# Patient Record
Sex: MALE | Race: WHITE | NOT HISPANIC OR LATINO | Employment: FULL TIME | URBAN - METROPOLITAN AREA
[De-identification: names, ages, dates, MRNs, and addresses within clinical notes are randomized per-mention and may not be internally consistent; named-entity substitution may affect disease eponyms.]

---

## 2018-12-24 ENCOUNTER — HOSPITAL ENCOUNTER (EMERGENCY)
Facility: CLINIC | Age: 58
Discharge: HOME | End: 2018-12-24
Attending: EMERGENCY MEDICINE | Admitting: EMERGENCY MEDICINE
Payer: COMMERCIAL

## 2018-12-24 VITALS
OXYGEN SATURATION: 99 % | WEIGHT: 165 LBS | TEMPERATURE: 98.3 F | HEART RATE: 73 BPM | SYSTOLIC BLOOD PRESSURE: 153 MMHG | DIASTOLIC BLOOD PRESSURE: 81 MMHG | RESPIRATION RATE: 16 BRPM

## 2018-12-24 DIAGNOSIS — H11.32 SCLERAL HEMORRHAGE OF LEFT EYE: ICD-10-CM

## 2018-12-24 PROCEDURE — 99282 EMERGENCY DEPT VISIT SF MDM: CPT

## 2018-12-24 ASSESSMENT — ENCOUNTER SYMPTOMS
EYE REDNESS: 1
EYE PAIN: 0

## 2018-12-24 NOTE — ED AVS SNAPSHOT
Emergency Department  [address] [place] [id number]  Phone:  [phone]  Fax:  [phone]                                    Ronnie Herrera Jr.   MRN: [id number]    Department:   Emergency Department   Date of Visit:  12/24/2018           After Visit Summary Signature Page    I have received my discharge instructions, and my questions have been answered. I have discussed any challenges I see with this plan with the nurse or doctor.    ..........................................................................................................................................  Patient/Patient Representative Signature      ..........................................................................................................................................  Patient Representative Print Name and Relationship to Patient    ..................................................               ................................................  Date                                   Time    ..........................................................................................................................................  Reviewed by Signature/Title    ...................................................              ..............................................  Date                                               Time          22EPIC Rev 08/18
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
## 2024-01-01 ENCOUNTER — ANCILLARY PROCEDURE (OUTPATIENT)
Dept: CT IMAGING | Facility: CLINIC | Age: 64
End: 2024-01-01
Attending: NURSE PRACTITIONER
Payer: COMMERCIAL

## 2024-01-01 ENCOUNTER — APPOINTMENT (OUTPATIENT)
Dept: PHYSICAL THERAPY | Facility: CLINIC | Age: 64
End: 2024-01-01
Attending: NURSE PRACTITIONER
Payer: COMMERCIAL

## 2024-01-01 ENCOUNTER — APPOINTMENT (OUTPATIENT)
Dept: CT IMAGING | Facility: CLINIC | Age: 64
End: 2024-01-01
Payer: COMMERCIAL

## 2024-01-01 ENCOUNTER — HOSPITAL ENCOUNTER (OUTPATIENT)
Facility: CLINIC | Age: 64
End: 2024-01-01
Payer: COMMERCIAL

## 2024-01-01 ENCOUNTER — TRANSFERRED RECORDS (OUTPATIENT)
Dept: HEALTH INFORMATION MANAGEMENT | Facility: CLINIC | Age: 64
End: 2024-01-01
Payer: COMMERCIAL

## 2024-01-01 ENCOUNTER — HOSPITAL ENCOUNTER (EMERGENCY)
Facility: CLINIC | Age: 64
Discharge: HOME | End: 2024-02-08
Attending: EMERGENCY MEDICINE | Admitting: EMERGENCY MEDICINE
Payer: COMMERCIAL

## 2024-01-01 ENCOUNTER — MEDICAL CORRESPONDENCE (OUTPATIENT)
Dept: SCHEDULING | Facility: CLINIC | Age: 64
End: 2024-01-01
Payer: COMMERCIAL

## 2024-01-01 ENCOUNTER — DOCUMENTATION ONLY (OUTPATIENT)
Dept: CARE COORDINATION | Facility: CLINIC | Age: 64
End: 2024-01-01
Payer: COMMERCIAL

## 2024-01-01 ENCOUNTER — HOSPITAL ENCOUNTER (OUTPATIENT)
Facility: CLINIC | Age: 64
Setting detail: OBSERVATION
Discharge: HOSPICE | End: 2024-04-21
Attending: STUDENT IN AN ORGANIZED HEALTH CARE EDUCATION/TRAINING PROGRAM | Admitting: INTERNAL MEDICINE
Payer: COMMERCIAL

## 2024-01-01 ENCOUNTER — HOSPITAL ENCOUNTER (OUTPATIENT)
Dept: CT IMAGING | Facility: CLINIC | Age: 64
Discharge: HOME | End: 2024-03-08
Attending: INTERNAL MEDICINE
Payer: COMMERCIAL

## 2024-01-01 ENCOUNTER — HOSPITAL ENCOUNTER (OUTPATIENT)
Facility: CLINIC | Age: 64
Discharge: HOME | End: 2024-03-08
Admitting: RADIOLOGY
Payer: COMMERCIAL

## 2024-01-01 ENCOUNTER — HOSPITAL ENCOUNTER (INPATIENT)
Facility: CLINIC | Age: 64
LOS: 13 days | End: 2024-05-04
Attending: INTERNAL MEDICINE | Admitting: INTERNAL MEDICINE
Payer: COMMERCIAL

## 2024-01-01 VITALS
OXYGEN SATURATION: 99 % | DIASTOLIC BLOOD PRESSURE: 88 MMHG | TEMPERATURE: 97.7 F | WEIGHT: 127 LBS | HEART RATE: 73 BPM | BODY MASS INDEX: 17.71 KG/M2 | RESPIRATION RATE: 16 BRPM | SYSTOLIC BLOOD PRESSURE: 132 MMHG

## 2024-01-01 VITALS
RESPIRATION RATE: 16 BRPM | TEMPERATURE: 97.7 F | WEIGHT: 110.27 LBS | HEART RATE: 65 BPM | HEIGHT: 71 IN | OXYGEN SATURATION: 98 % | SYSTOLIC BLOOD PRESSURE: 152 MMHG | BODY MASS INDEX: 15.44 KG/M2 | DIASTOLIC BLOOD PRESSURE: 67 MMHG

## 2024-01-01 VITALS
OXYGEN SATURATION: 99 % | BODY MASS INDEX: 19.6 KG/M2 | SYSTOLIC BLOOD PRESSURE: 150 MMHG | DIASTOLIC BLOOD PRESSURE: 99 MMHG | RESPIRATION RATE: 15 BRPM | HEART RATE: 63 BPM | TEMPERATURE: 98.9 F | WEIGHT: 140 LBS | HEIGHT: 71 IN

## 2024-01-01 VITALS — RESPIRATION RATE: 24 BRPM

## 2024-01-01 DIAGNOSIS — C20 RECTAL CANCER (H): ICD-10-CM

## 2024-01-01 DIAGNOSIS — K64.4 EXTERNAL HEMORRHOIDS: ICD-10-CM

## 2024-01-01 DIAGNOSIS — C78.01 SECONDARY MALIGNANT NEOPLASM OF RIGHT LUNG (H): ICD-10-CM

## 2024-01-01 DIAGNOSIS — R53.1 GENERALIZED WEAKNESS: ICD-10-CM

## 2024-01-01 DIAGNOSIS — R62.7 FAILURE TO THRIVE IN ADULT: ICD-10-CM

## 2024-01-01 DIAGNOSIS — C78.02 SECONDARY MALIGNANT NEOPLASM OF LEFT LUNG (H): ICD-10-CM

## 2024-01-01 DIAGNOSIS — K62.5 RECTAL BLEEDING: ICD-10-CM

## 2024-01-01 DIAGNOSIS — C20: ICD-10-CM

## 2024-01-01 DIAGNOSIS — C20 RECTAL CARCINOMA (H): ICD-10-CM

## 2024-01-01 LAB
ABO/RH(D): NORMAL
ALBUMIN SERPL BCG-MCNC: 3.9 G/DL (ref 3.5–5.2)
ALP SERPL-CCNC: 87 U/L (ref 40–150)
ALT SERPL W P-5'-P-CCNC: 9 U/L (ref 0–70)
ANION GAP SERPL CALCULATED.3IONS-SCNC: 14 MMOL/L (ref 7–15)
ANION GAP SERPL CALCULATED.3IONS-SCNC: 7 MMOL/L (ref 7–15)
ANION GAP SERPL CALCULATED.3IONS-SCNC: 7 MMOL/L (ref 7–15)
ANION GAP SERPL CALCULATED.3IONS-SCNC: 9 MMOL/L (ref 7–15)
ANTIBODY SCREEN: NEGATIVE
APTT PPP: 30 SECONDS (ref 22–38)
APTT PPP: 31 SECONDS (ref 22–38)
AST SERPL W P-5'-P-CCNC: 15 U/L (ref 0–45)
ATRIAL RATE - MUSE: 67 BPM
BASOPHILS # BLD AUTO: 0 10E3/UL (ref 0–0.2)
BASOPHILS # BLD AUTO: 0 10E3/UL (ref 0–0.2)
BASOPHILS NFR BLD AUTO: 0 %
BASOPHILS NFR BLD AUTO: 0 %
BILIRUB SERPL-MCNC: 0.8 MG/DL
BUN SERPL-MCNC: 10 MG/DL (ref 8–23)
BUN SERPL-MCNC: 14.8 MG/DL (ref 8–23)
BUN SERPL-MCNC: 21.5 MG/DL (ref 8–23)
BUN SERPL-MCNC: 8.8 MG/DL (ref 8–23)
CALCIUM SERPL-MCNC: 8.5 MG/DL (ref 8.8–10.2)
CALCIUM SERPL-MCNC: 8.9 MG/DL (ref 8.8–10.2)
CALCIUM SERPL-MCNC: 8.9 MG/DL (ref 8.8–10.2)
CALCIUM SERPL-MCNC: 9.4 MG/DL (ref 8.8–10.2)
CHLORIDE SERPL-SCNC: 102 MMOL/L (ref 98–107)
CHLORIDE SERPL-SCNC: 103 MMOL/L (ref 98–107)
CHLORIDE SERPL-SCNC: 103 MMOL/L (ref 98–107)
CHLORIDE SERPL-SCNC: 98 MMOL/L (ref 98–107)
CREAT SERPL-MCNC: 0.72 MG/DL (ref 0.67–1.17)
CREAT SERPL-MCNC: 0.76 MG/DL (ref 0.67–1.17)
CREAT SERPL-MCNC: 0.93 MG/DL (ref 0.67–1.17)
CREAT SERPL-MCNC: 0.98 MG/DL (ref 0.67–1.17)
DEPRECATED HCO3 PLAS-SCNC: 24 MMOL/L (ref 22–29)
DEPRECATED HCO3 PLAS-SCNC: 27 MMOL/L (ref 22–29)
DEPRECATED HCO3 PLAS-SCNC: 27 MMOL/L (ref 22–29)
DEPRECATED HCO3 PLAS-SCNC: 28 MMOL/L (ref 22–29)
DIASTOLIC BLOOD PRESSURE - MUSE: NORMAL MMHG
EGFRCR SERPLBLD CKD-EPI 2021: 86 ML/MIN/1.73M2
EGFRCR SERPLBLD CKD-EPI 2021: >90 ML/MIN/1.73M2
EOSINOPHIL # BLD AUTO: 0 10E3/UL (ref 0–0.7)
EOSINOPHIL # BLD AUTO: 0.1 10E3/UL (ref 0–0.7)
EOSINOPHIL NFR BLD AUTO: 1 %
EOSINOPHIL NFR BLD AUTO: 3 %
ERYTHROCYTE [DISTWIDTH] IN BLOOD BY AUTOMATED COUNT: 13.1 % (ref 10–15)
ERYTHROCYTE [DISTWIDTH] IN BLOOD BY AUTOMATED COUNT: 14.4 % (ref 10–15)
ERYTHROCYTE [DISTWIDTH] IN BLOOD BY AUTOMATED COUNT: 14.6 % (ref 10–15)
FERRITIN SERPL-MCNC: 467 NG/ML (ref 31–409)
GLUCOSE BLDC GLUCOMTR-MCNC: 122 MG/DL (ref 70–99)
GLUCOSE SERPL-MCNC: 103 MG/DL (ref 70–99)
GLUCOSE SERPL-MCNC: 114 MG/DL (ref 70–99)
GLUCOSE SERPL-MCNC: 116 MG/DL (ref 70–99)
GLUCOSE SERPL-MCNC: 120 MG/DL (ref 70–99)
HCT VFR BLD AUTO: 31.1 % (ref 40–53)
HCT VFR BLD AUTO: 32.8 % (ref 40–53)
HCT VFR BLD AUTO: 34.7 % (ref 40–53)
HCT VFR BLD AUTO: 35 % (ref 40–53)
HCT VFR BLD AUTO: 44.4 % (ref 40–53)
HGB BLD-MCNC: 10.4 G/DL (ref 13.3–17.7)
HGB BLD-MCNC: 11.3 G/DL (ref 13.3–17.7)
HGB BLD-MCNC: 11.5 G/DL (ref 13.3–17.7)
HGB BLD-MCNC: 11.6 G/DL (ref 13.3–17.7)
HGB BLD-MCNC: 11.7 G/DL (ref 13.3–17.7)
HGB BLD-MCNC: 15.5 G/DL (ref 13.3–17.7)
HOLD SPECIMEN: NORMAL
IMM GRANULOCYTES # BLD: 0 10E3/UL
IMM GRANULOCYTES # BLD: 0 10E3/UL
IMM GRANULOCYTES NFR BLD: 0 %
IMM GRANULOCYTES NFR BLD: 0 %
INR PPP: 0.95 (ref 0.85–1.15)
INR PPP: 0.98 (ref 0.85–1.15)
INTERPRETATION ECG - MUSE: NORMAL
IRON BINDING CAPACITY (ROCHE): 243 UG/DL (ref 240–430)
IRON SATN MFR SERPL: 19 % (ref 15–46)
IRON SERPL-MCNC: 47 UG/DL (ref 61–157)
LYMPHOCYTES # BLD AUTO: 0.4 10E3/UL (ref 0.8–5.3)
LYMPHOCYTES # BLD AUTO: 0.8 10E3/UL (ref 0.8–5.3)
LYMPHOCYTES NFR BLD AUTO: 11 %
LYMPHOCYTES NFR BLD AUTO: 15 %
MAGNESIUM SERPL-MCNC: 1.8 MG/DL (ref 1.7–2.3)
MAGNESIUM SERPL-MCNC: 1.9 MG/DL (ref 1.7–2.3)
MAGNESIUM SERPL-MCNC: 2.1 MG/DL (ref 1.7–2.3)
MAGNESIUM SERPL-MCNC: 2.2 MG/DL (ref 1.7–2.3)
MAGNESIUM SERPL-MCNC: 2.3 MG/DL (ref 1.7–2.3)
MCH RBC QN AUTO: 32.5 PG (ref 26.5–33)
MCH RBC QN AUTO: 32.5 PG (ref 26.5–33)
MCH RBC QN AUTO: 33 PG (ref 26.5–33)
MCH RBC QN AUTO: 33.3 PG (ref 26.5–33)
MCH RBC QN AUTO: 33.8 PG (ref 26.5–33)
MCHC RBC AUTO-ENTMCNC: 33.4 G/DL (ref 31.5–36.5)
MCHC RBC AUTO-ENTMCNC: 34.5 G/DL (ref 31.5–36.5)
MCHC RBC AUTO-ENTMCNC: 34.9 G/DL (ref 31.5–36.5)
MCV RBC AUTO: 97 FL (ref 78–100)
MCV RBC AUTO: 99 FL (ref 78–100)
MONOCYTES # BLD AUTO: 0.3 10E3/UL (ref 0–1.3)
MONOCYTES # BLD AUTO: 0.3 10E3/UL (ref 0–1.3)
MONOCYTES NFR BLD AUTO: 5 %
MONOCYTES NFR BLD AUTO: 9 %
NEUTROPHILS # BLD AUTO: 2.8 10E3/UL (ref 1.6–8.3)
NEUTROPHILS # BLD AUTO: 3.9 10E3/UL (ref 1.6–8.3)
NEUTROPHILS NFR BLD AUTO: 77 %
NEUTROPHILS NFR BLD AUTO: 79 %
NRBC # BLD AUTO: 0 10E3/UL
NRBC # BLD AUTO: 0 10E3/UL
NRBC BLD AUTO-RTO: 0 /100
NRBC BLD AUTO-RTO: 0 /100
P AXIS - MUSE: -19 DEGREES
PHOSPHATE SERPL-MCNC: 2.7 MG/DL (ref 2.5–4.5)
PHOSPHATE SERPL-MCNC: 2.8 MG/DL (ref 2.5–4.5)
PHOSPHATE SERPL-MCNC: 3 MG/DL (ref 2.5–4.5)
PHOSPHATE SERPL-MCNC: 3 MG/DL (ref 2.5–4.5)
PLATELET # BLD AUTO: 108 10E3/UL (ref 150–450)
PLATELET # BLD AUTO: 113 10E3/UL (ref 150–450)
PLATELET # BLD AUTO: 113 10E3/UL (ref 150–450)
PLATELET # BLD AUTO: 115 10E3/UL (ref 150–450)
PLATELET # BLD AUTO: 122 10E3/UL (ref 150–450)
POTASSIUM SERPL-SCNC: 3.7 MMOL/L (ref 3.4–5.3)
POTASSIUM SERPL-SCNC: 3.8 MMOL/L (ref 3.4–5.3)
POTASSIUM SERPL-SCNC: 3.9 MMOL/L (ref 3.4–5.3)
POTASSIUM SERPL-SCNC: 3.9 MMOL/L (ref 3.4–5.3)
POTASSIUM SERPL-SCNC: 4.1 MMOL/L (ref 3.4–5.3)
POTASSIUM SERPL-SCNC: 4.1 MMOL/L (ref 3.4–5.3)
PR INTERVAL - MUSE: 88 MS
PROT SERPL-MCNC: 6.8 G/DL (ref 6.4–8.3)
QRS DURATION - MUSE: 70 MS
QT - MUSE: 370 MS
QTC - MUSE: 390 MS
R AXIS - MUSE: 44 DEGREES
RBC # BLD AUTO: 3.2 10E6/UL (ref 4.4–5.9)
RBC # BLD AUTO: 3.39 10E6/UL (ref 4.4–5.9)
RBC # BLD AUTO: 3.55 10E6/UL (ref 4.4–5.9)
RBC # BLD AUTO: 3.57 10E6/UL (ref 4.4–5.9)
RBC # BLD AUTO: 4.58 10E6/UL (ref 4.4–5.9)
SODIUM SERPL-SCNC: 133 MMOL/L (ref 135–145)
SODIUM SERPL-SCNC: 137 MMOL/L (ref 135–145)
SODIUM SERPL-SCNC: 139 MMOL/L (ref 135–145)
SODIUM SERPL-SCNC: 140 MMOL/L (ref 135–145)
SPECIMEN EXPIRATION DATE: NORMAL
SYSTOLIC BLOOD PRESSURE - MUSE: NORMAL MMHG
T AXIS - MUSE: 33 DEGREES
TROPONIN T SERPL HS-MCNC: 9 NG/L
VENTRICULAR RATE- MUSE: 67 BPM
WBC # BLD AUTO: 3.6 10E3/UL (ref 4–11)
WBC # BLD AUTO: 4.2 10E3/UL (ref 4–11)
WBC # BLD AUTO: 4.8 10E3/UL (ref 4–11)
WBC # BLD AUTO: 5.1 10E3/UL (ref 4–11)
WBC # BLD AUTO: 5.5 10E3/UL (ref 4–11)

## 2024-01-01 PROCEDURE — 250N000013 HC RX MED GY IP 250 OP 250 PS 637: Performed by: INTERNAL MEDICINE

## 2024-01-01 PROCEDURE — 250N000011 HC RX IP 250 OP 636: Performed by: INTERNAL MEDICINE

## 2024-01-01 PROCEDURE — 250N000011 HC RX IP 250 OP 636: Mod: JZ | Performed by: INTERNAL MEDICINE

## 2024-01-01 PROCEDURE — 258N000003 HC RX IP 258 OP 636: Performed by: INTERNAL MEDICINE

## 2024-01-01 PROCEDURE — 99232 SBSQ HOSP IP/OBS MODERATE 35: CPT | Performed by: INTERNAL MEDICINE

## 2024-01-01 PROCEDURE — 99232 SBSQ HOSP IP/OBS MODERATE 35: CPT | Mod: GV | Performed by: HOSPITALIST

## 2024-01-01 PROCEDURE — 36415 COLL VENOUS BLD VENIPUNCTURE: CPT | Performed by: NURSE PRACTITIONER

## 2024-01-01 PROCEDURE — G0378 HOSPITAL OBSERVATION PER HR: HCPCS

## 2024-01-01 PROCEDURE — 96375 TX/PRO/DX INJ NEW DRUG ADDON: CPT

## 2024-01-01 PROCEDURE — 110N000005 HC R&B HOSPICE, ACCENT

## 2024-01-01 PROCEDURE — 83735 ASSAY OF MAGNESIUM: CPT | Performed by: INTERNAL MEDICINE

## 2024-01-01 PROCEDURE — 250N000011 HC RX IP 250 OP 636: Performed by: HOSPITALIST

## 2024-01-01 PROCEDURE — 84100 ASSAY OF PHOSPHORUS: CPT | Performed by: INTERNAL MEDICINE

## 2024-01-01 PROCEDURE — 36415 COLL VENOUS BLD VENIPUNCTURE: CPT | Performed by: INTERNAL MEDICINE

## 2024-01-01 PROCEDURE — 250N000013 HC RX MED GY IP 250 OP 250 PS 637: Performed by: NURSE PRACTITIONER

## 2024-01-01 PROCEDURE — 99231 SBSQ HOSP IP/OBS SF/LOW 25: CPT | Mod: GV | Performed by: INTERNAL MEDICINE

## 2024-01-01 PROCEDURE — 96361 HYDRATE IV INFUSION ADD-ON: CPT

## 2024-01-01 PROCEDURE — 250N000011 HC RX IP 250 OP 636: Performed by: STUDENT IN AN ORGANIZED HEALTH CARE EDUCATION/TRAINING PROGRAM

## 2024-01-01 PROCEDURE — 82728 ASSAY OF FERRITIN: CPT | Performed by: INTERNAL MEDICINE

## 2024-01-01 PROCEDURE — 250N000011 HC RX IP 250 OP 636

## 2024-01-01 PROCEDURE — 96374 THER/PROPH/DIAG INJ IV PUSH: CPT

## 2024-01-01 PROCEDURE — 99233 SBSQ HOSP IP/OBS HIGH 50: CPT | Performed by: INTERNAL MEDICINE

## 2024-01-01 PROCEDURE — 250N000011 HC RX IP 250 OP 636: Performed by: NURSE PRACTITIONER

## 2024-01-01 PROCEDURE — 85610 PROTHROMBIN TIME: CPT | Performed by: EMERGENCY MEDICINE

## 2024-01-01 PROCEDURE — 250N000009 HC RX 250

## 2024-01-01 PROCEDURE — 82374 ASSAY BLOOD CARBON DIOXIDE: CPT | Performed by: EMERGENCY MEDICINE

## 2024-01-01 PROCEDURE — 96376 TX/PRO/DX INJ SAME DRUG ADON: CPT

## 2024-01-01 PROCEDURE — 83735 ASSAY OF MAGNESIUM: CPT | Performed by: STUDENT IN AN ORGANIZED HEALTH CARE EDUCATION/TRAINING PROGRAM

## 2024-01-01 PROCEDURE — 36415 COLL VENOUS BLD VENIPUNCTURE: CPT

## 2024-01-01 PROCEDURE — 99232 SBSQ HOSP IP/OBS MODERATE 35: CPT | Mod: GV | Performed by: INTERNAL MEDICINE

## 2024-01-01 PROCEDURE — 99233 SBSQ HOSP IP/OBS HIGH 50: CPT | Mod: GV | Performed by: INTERNAL MEDICINE

## 2024-01-01 PROCEDURE — 84484 ASSAY OF TROPONIN QUANT: CPT

## 2024-01-01 PROCEDURE — 85018 HEMOGLOBIN: CPT

## 2024-01-01 PROCEDURE — 71260 CT THORAX DX C+: CPT

## 2024-01-01 PROCEDURE — 85027 COMPLETE CBC AUTOMATED: CPT | Performed by: INTERNAL MEDICINE

## 2024-01-01 PROCEDURE — 258N000003 HC RX IP 258 OP 636: Performed by: STUDENT IN AN ORGANIZED HEALTH CARE EDUCATION/TRAINING PROGRAM

## 2024-01-01 PROCEDURE — 99291 CRITICAL CARE FIRST HOUR: CPT

## 2024-01-01 PROCEDURE — 80048 BASIC METABOLIC PNL TOTAL CA: CPT | Performed by: INTERNAL MEDICINE

## 2024-01-01 PROCEDURE — 70450 CT HEAD/BRAIN W/O DYE: CPT | Mod: XU

## 2024-01-01 PROCEDURE — 97116 GAIT TRAINING THERAPY: CPT | Mod: GP

## 2024-01-01 PROCEDURE — 258N000003 HC RX IP 258 OP 636

## 2024-01-01 PROCEDURE — 70496 CT ANGIOGRAPHY HEAD: CPT

## 2024-01-01 PROCEDURE — 84132 ASSAY OF SERUM POTASSIUM: CPT | Performed by: INTERNAL MEDICINE

## 2024-01-01 PROCEDURE — 250N000009 HC RX 250: Performed by: INTERNAL MEDICINE

## 2024-01-01 PROCEDURE — 84100 ASSAY OF PHOSPHORUS: CPT | Performed by: STUDENT IN AN ORGANIZED HEALTH CARE EDUCATION/TRAINING PROGRAM

## 2024-01-01 PROCEDURE — 85025 COMPLETE CBC W/AUTO DIFF WBC: CPT | Performed by: NURSE PRACTITIONER

## 2024-01-01 PROCEDURE — 250N000009 HC RX 250: Performed by: NURSE PRACTITIONER

## 2024-01-01 PROCEDURE — 93010 ELECTROCARDIOGRAM REPORT: CPT | Performed by: INTERNAL MEDICINE

## 2024-01-01 PROCEDURE — 85027 COMPLETE CBC AUTOMATED: CPT | Performed by: NURSE PRACTITIONER

## 2024-01-01 PROCEDURE — 80048 BASIC METABOLIC PNL TOTAL CA: CPT | Performed by: NURSE PRACTITIONER

## 2024-01-01 PROCEDURE — 82374 ASSAY BLOOD CARBON DIOXIDE: CPT | Performed by: STUDENT IN AN ORGANIZED HEALTH CARE EDUCATION/TRAINING PROGRAM

## 2024-01-01 PROCEDURE — 250N000011 HC RX IP 250 OP 636: Mod: JZ

## 2024-01-01 PROCEDURE — 85610 PROTHROMBIN TIME: CPT

## 2024-01-01 PROCEDURE — 258N000003 HC RX IP 258 OP 636: Performed by: HOSPITALIST

## 2024-01-01 PROCEDURE — 99223 1ST HOSP IP/OBS HIGH 75: CPT | Performed by: NURSE PRACTITIONER

## 2024-01-01 PROCEDURE — 86900 BLOOD TYPING SEROLOGIC ABO: CPT | Performed by: EMERGENCY MEDICINE

## 2024-01-01 PROCEDURE — 36415 COLL VENOUS BLD VENIPUNCTURE: CPT | Performed by: STUDENT IN AN ORGANIZED HEALTH CARE EDUCATION/TRAINING PROGRAM

## 2024-01-01 PROCEDURE — 93005 ELECTROCARDIOGRAM TRACING: CPT

## 2024-01-01 PROCEDURE — 85730 THROMBOPLASTIN TIME PARTIAL: CPT | Performed by: EMERGENCY MEDICINE

## 2024-01-01 PROCEDURE — 85730 THROMBOPLASTIN TIME PARTIAL: CPT

## 2024-01-01 PROCEDURE — 99285 EMERGENCY DEPT VISIT HI MDM: CPT | Mod: 25

## 2024-01-01 PROCEDURE — 83550 IRON BINDING TEST: CPT | Performed by: INTERNAL MEDICINE

## 2024-01-01 PROCEDURE — 99283 EMERGENCY DEPT VISIT LOW MDM: CPT

## 2024-01-01 PROCEDURE — 99207 PR APP CREDIT; MD BILLING SHARED VISIT: CPT | Performed by: INTERNAL MEDICINE

## 2024-01-01 PROCEDURE — 97161 PT EVAL LOW COMPLEX 20 MIN: CPT | Mod: GP

## 2024-01-01 PROCEDURE — 85004 AUTOMATED DIFF WBC COUNT: CPT | Performed by: EMERGENCY MEDICINE

## 2024-01-01 PROCEDURE — 97530 THERAPEUTIC ACTIVITIES: CPT | Mod: GP

## 2024-01-01 PROCEDURE — 96375 TX/PRO/DX INJ NEW DRUG ADDON: CPT | Mod: 59

## 2024-01-01 PROCEDURE — 96360 HYDRATION IV INFUSION INIT: CPT

## 2024-01-01 PROCEDURE — 99207 PR APP CREDIT; MD BILLING SHARED VISIT: CPT | Mod: GV | Performed by: HOSPITALIST

## 2024-01-01 PROCEDURE — 82962 GLUCOSE BLOOD TEST: CPT

## 2024-01-01 PROCEDURE — 36415 COLL VENOUS BLD VENIPUNCTURE: CPT | Performed by: EMERGENCY MEDICINE

## 2024-01-01 PROCEDURE — 120N000001 HC R&B MED SURG/OB

## 2024-01-01 PROCEDURE — 250N000013 HC RX MED GY IP 250 OP 250 PS 637

## 2024-01-01 PROCEDURE — 83735 ASSAY OF MAGNESIUM: CPT | Performed by: EMERGENCY MEDICINE

## 2024-01-01 PROCEDURE — 84132 ASSAY OF SERUM POTASSIUM: CPT

## 2024-01-01 RX ORDER — NALOXONE HYDROCHLORIDE 0.4 MG/ML
0.2 INJECTION, SOLUTION INTRAMUSCULAR; INTRAVENOUS; SUBCUTANEOUS
Status: DISCONTINUED | OUTPATIENT
Start: 2024-01-01 | End: 2024-01-01 | Stop reason: HOSPADM

## 2024-01-01 RX ORDER — MORPHINE SULFATE 20 MG/ML
10 SOLUTION ORAL EVERY 6 HOURS
Status: DISCONTINUED | OUTPATIENT
Start: 2024-01-01 | End: 2024-01-01

## 2024-01-01 RX ORDER — MORPHINE SULFATE 4 MG/ML
4 INJECTION, SOLUTION INTRAMUSCULAR; INTRAVENOUS EVERY 4 HOURS PRN
Status: DISCONTINUED | OUTPATIENT
Start: 2024-01-01 | End: 2024-01-01

## 2024-01-01 RX ORDER — ACETAMINOPHEN 325 MG/1
650 TABLET ORAL EVERY 4 HOURS PRN
Status: CANCELLED | OUTPATIENT
Start: 2024-01-01

## 2024-01-01 RX ORDER — ONDANSETRON 2 MG/ML
4 INJECTION INTRAMUSCULAR; INTRAVENOUS EVERY 6 HOURS PRN
Status: DISCONTINUED | OUTPATIENT
Start: 2024-01-01 | End: 2024-01-01 | Stop reason: HOSPADM

## 2024-01-01 RX ORDER — NICOTINE 21 MG/24HR
1 PATCH, TRANSDERMAL 24 HOURS TRANSDERMAL DAILY
Status: DISCONTINUED | OUTPATIENT
Start: 2024-01-01 | End: 2024-01-01 | Stop reason: HOSPADM

## 2024-01-01 RX ORDER — MINERAL OIL/HYDROPHIL PETROLAT
OINTMENT (GRAM) TOPICAL
Status: DISCONTINUED | OUTPATIENT
Start: 2024-01-01 | End: 2024-01-01 | Stop reason: HOSPADM

## 2024-01-01 RX ORDER — BISACODYL 10 MG
10 SUPPOSITORY, RECTAL RECTAL
Status: DISCONTINUED | OUTPATIENT
Start: 2024-01-01 | End: 2024-01-01

## 2024-01-01 RX ORDER — CALCIUM CARBONATE 500 MG/1
1000 TABLET, CHEWABLE ORAL 4 TIMES DAILY PRN
Status: DISCONTINUED | OUTPATIENT
Start: 2024-01-01 | End: 2024-01-01 | Stop reason: HOSPADM

## 2024-01-01 RX ORDER — LORAZEPAM 1 MG/1
1 TABLET ORAL
COMMUNITY

## 2024-01-01 RX ORDER — MORPHINE SULFATE 20 MG/ML
10 SOLUTION ORAL
Status: DISCONTINUED | OUTPATIENT
Start: 2024-01-01 | End: 2024-01-01

## 2024-01-01 RX ORDER — IOPAMIDOL 755 MG/ML
90 INJECTION, SOLUTION INTRAVASCULAR ONCE
Status: COMPLETED | OUTPATIENT
Start: 2024-01-01 | End: 2024-01-01

## 2024-01-01 RX ORDER — MORPHINE SULFATE 20 MG/ML
15 SOLUTION ORAL
Status: DISCONTINUED | OUTPATIENT
Start: 2024-01-01 | End: 2024-01-01

## 2024-01-01 RX ORDER — NALOXONE HYDROCHLORIDE 0.4 MG/ML
0.2 INJECTION, SOLUTION INTRAMUSCULAR; INTRAVENOUS; SUBCUTANEOUS
Status: DISCONTINUED | OUTPATIENT
Start: 2024-01-01 | End: 2024-01-01

## 2024-01-01 RX ORDER — SENNOSIDES 8.6 MG
1 TABLET ORAL 2 TIMES DAILY PRN
Status: DISCONTINUED | OUTPATIENT
Start: 2024-01-01 | End: 2024-01-01

## 2024-01-01 RX ORDER — AMOXICILLIN 250 MG
1 CAPSULE ORAL 2 TIMES DAILY PRN
Status: DISCONTINUED | OUTPATIENT
Start: 2024-01-01 | End: 2024-01-01 | Stop reason: HOSPADM

## 2024-01-01 RX ORDER — GLYCOPYRROLATE 0.2 MG/ML
0.4 INJECTION, SOLUTION INTRAMUSCULAR; INTRAVENOUS EVERY 4 HOURS PRN
Status: DISCONTINUED | OUTPATIENT
Start: 2024-01-01 | End: 2024-01-01 | Stop reason: HOSPADM

## 2024-01-01 RX ORDER — LORAZEPAM 1 MG/1
1 TABLET ORAL
Status: CANCELLED | OUTPATIENT
Start: 2024-01-01

## 2024-01-01 RX ORDER — AMOXICILLIN 250 MG
1 CAPSULE ORAL 2 TIMES DAILY
Status: DISCONTINUED | OUTPATIENT
Start: 2024-01-01 | End: 2024-01-01

## 2024-01-01 RX ORDER — MORPHINE SULFATE 2 MG/ML
2 INJECTION, SOLUTION INTRAMUSCULAR; INTRAVENOUS
Status: DISCONTINUED | OUTPATIENT
Start: 2024-01-01 | End: 2024-01-01 | Stop reason: HOSPADM

## 2024-01-01 RX ORDER — HYDROMORPHONE HCL IN WATER/PF 6 MG/30 ML
0.2 PATIENT CONTROLLED ANALGESIA SYRINGE INTRAVENOUS
Status: DISCONTINUED | OUTPATIENT
Start: 2024-01-01 | End: 2024-01-01 | Stop reason: ALTCHOICE

## 2024-01-01 RX ORDER — GLYCOPYRROLATE 0.2 MG/ML
0.2 INJECTION, SOLUTION INTRAMUSCULAR; INTRAVENOUS EVERY 4 HOURS PRN
Status: DISCONTINUED | OUTPATIENT
Start: 2024-01-01 | End: 2024-01-01 | Stop reason: HOSPADM

## 2024-01-01 RX ORDER — MORPHINE SULFATE 4 MG/ML
5 INJECTION, SOLUTION INTRAMUSCULAR; INTRAVENOUS
Status: DISCONTINUED | OUTPATIENT
Start: 2024-01-01 | End: 2024-01-01

## 2024-01-01 RX ORDER — MORPHINE SULFATE 4 MG/ML
4 INJECTION, SOLUTION INTRAMUSCULAR; INTRAVENOUS
Status: DISCONTINUED | OUTPATIENT
Start: 2024-01-01 | End: 2024-01-01

## 2024-01-01 RX ORDER — MORPHINE SULFATE 2 MG/ML
1 INJECTION, SOLUTION INTRAMUSCULAR; INTRAVENOUS
Status: DISCONTINUED | OUTPATIENT
Start: 2024-01-01 | End: 2024-01-01

## 2024-01-01 RX ORDER — MORPHINE SULFATE 20 MG/ML
5 SOLUTION ORAL ONCE
Status: COMPLETED | OUTPATIENT
Start: 2024-01-01 | End: 2024-01-01

## 2024-01-01 RX ORDER — ACETAMINOPHEN 325 MG/1
325-650 TABLET ORAL PRN
COMMUNITY

## 2024-01-01 RX ORDER — MORPHINE SULFATE 20 MG/ML
10 SOLUTION ORAL
Status: CANCELLED | OUTPATIENT
Start: 2024-01-01

## 2024-01-01 RX ORDER — ATROPINE SULFATE 10 MG/ML
2 SOLUTION/ DROPS OPHTHALMIC EVERY 4 HOURS PRN
Status: CANCELLED | OUTPATIENT
Start: 2024-01-01

## 2024-01-01 RX ORDER — NALOXONE HYDROCHLORIDE 0.4 MG/ML
0.2 INJECTION, SOLUTION INTRAMUSCULAR; INTRAVENOUS; SUBCUTANEOUS
Status: CANCELLED | OUTPATIENT
Start: 2024-01-01

## 2024-01-01 RX ORDER — MORPHINE SULFATE 2 MG/ML
4 INJECTION, SOLUTION INTRAMUSCULAR; INTRAVENOUS
Status: DISCONTINUED | OUTPATIENT
Start: 2024-01-01 | End: 2024-01-01

## 2024-01-01 RX ORDER — OLANZAPINE 5 MG/1
10 TABLET, ORALLY DISINTEGRATING ORAL EVERY 4 HOURS PRN
Status: DISCONTINUED | OUTPATIENT
Start: 2024-01-01 | End: 2024-01-01

## 2024-01-01 RX ORDER — HYDRALAZINE HYDROCHLORIDE 10 MG/1
10 TABLET, FILM COATED ORAL EVERY 4 HOURS PRN
Status: DISCONTINUED | OUTPATIENT
Start: 2024-01-01 | End: 2024-01-01

## 2024-01-01 RX ORDER — POLYETHYLENE GLYCOL 3350 17 G/17G
17 POWDER, FOR SOLUTION ORAL DAILY
Status: DISCONTINUED | OUTPATIENT
Start: 2024-01-01 | End: 2024-01-01

## 2024-01-01 RX ORDER — SODIUM CHLORIDE 9 MG/ML
INJECTION, SOLUTION INTRAVENOUS CONTINUOUS
Status: DISCONTINUED | OUTPATIENT
Start: 2024-01-01 | End: 2024-01-01

## 2024-01-01 RX ORDER — MORPHINE SULFATE 2 MG/ML
2 INJECTION, SOLUTION INTRAMUSCULAR; INTRAVENOUS
Status: CANCELLED | OUTPATIENT
Start: 2024-01-01

## 2024-01-01 RX ORDER — LORAZEPAM 2 MG/ML
1 INJECTION INTRAMUSCULAR
Status: DISCONTINUED | OUTPATIENT
Start: 2024-01-01 | End: 2024-01-01

## 2024-01-01 RX ORDER — ONDANSETRON 4 MG/1
4 TABLET, ORALLY DISINTEGRATING ORAL EVERY 6 HOURS PRN
Status: DISCONTINUED | OUTPATIENT
Start: 2024-01-01 | End: 2024-01-01 | Stop reason: HOSPADM

## 2024-01-01 RX ORDER — LIDOCAINE 40 MG/G
CREAM TOPICAL
Status: DISCONTINUED | OUTPATIENT
Start: 2024-01-01 | End: 2024-01-01 | Stop reason: HOSPADM

## 2024-01-01 RX ORDER — AMOXICILLIN 250 MG
2 CAPSULE ORAL 2 TIMES DAILY PRN
Status: CANCELLED | OUTPATIENT
Start: 2024-01-01

## 2024-01-01 RX ORDER — WATER 10 ML/10ML
INJECTION INTRAMUSCULAR; INTRAVENOUS; SUBCUTANEOUS
Status: COMPLETED
Start: 2024-01-01 | End: 2024-01-01

## 2024-01-01 RX ORDER — LEVETIRACETAM 10 MG/ML
1000 INJECTION INTRAVASCULAR ONCE
Status: DISCONTINUED | OUTPATIENT
Start: 2024-01-01 | End: 2024-01-01

## 2024-01-01 RX ORDER — CAPECITABINE 500 MG/1
3 TABLET, FILM COATED ORAL
COMMUNITY

## 2024-01-01 RX ORDER — AMOXICILLIN 250 MG
2 CAPSULE ORAL 2 TIMES DAILY PRN
Status: DISCONTINUED | OUTPATIENT
Start: 2024-01-01 | End: 2024-01-01 | Stop reason: HOSPADM

## 2024-01-01 RX ORDER — MORPHINE SULFATE 20 MG/ML
10 SOLUTION ORAL
Status: DISCONTINUED | OUTPATIENT
Start: 2024-01-01 | End: 2024-01-01 | Stop reason: HOSPADM

## 2024-01-01 RX ORDER — HEPARIN SODIUM (PORCINE) LOCK FLUSH IV SOLN 100 UNIT/ML 100 UNIT/ML
5 SOLUTION INTRAVENOUS ONCE
Status: COMPLETED | OUTPATIENT
Start: 2024-01-01 | End: 2024-01-01

## 2024-01-01 RX ORDER — NALOXONE HYDROCHLORIDE 0.4 MG/ML
0.1 INJECTION, SOLUTION INTRAMUSCULAR; INTRAVENOUS; SUBCUTANEOUS
Status: DISCONTINUED | OUTPATIENT
Start: 2024-01-01 | End: 2024-01-01 | Stop reason: HOSPADM

## 2024-01-01 RX ORDER — MORPHINE SULFATE 20 MG/ML
10 SOLUTION ORAL EVERY 4 HOURS
Status: DISCONTINUED | OUTPATIENT
Start: 2024-01-01 | End: 2024-01-01

## 2024-01-01 RX ORDER — BISACODYL 10 MG
10 SUPPOSITORY, RECTAL RECTAL DAILY PRN
Status: DISCONTINUED | OUTPATIENT
Start: 2024-01-01 | End: 2024-01-01 | Stop reason: HOSPADM

## 2024-01-01 RX ORDER — LORAZEPAM 2 MG/ML
1 INJECTION INTRAMUSCULAR EVERY 4 HOURS
Status: DISCONTINUED | OUTPATIENT
Start: 2024-01-01 | End: 2024-01-01

## 2024-01-01 RX ORDER — NALOXONE HYDROCHLORIDE 0.4 MG/ML
0.4 INJECTION, SOLUTION INTRAMUSCULAR; INTRAVENOUS; SUBCUTANEOUS
Status: DISCONTINUED | OUTPATIENT
Start: 2024-01-01 | End: 2024-01-01

## 2024-01-01 RX ORDER — PROCHLORPERAZINE 25 MG
12.5 SUPPOSITORY, RECTAL RECTAL EVERY 12 HOURS PRN
Status: DISCONTINUED | OUTPATIENT
Start: 2024-01-01 | End: 2024-01-01 | Stop reason: HOSPADM

## 2024-01-01 RX ORDER — MORPHINE SULFATE 2 MG/ML
3 INJECTION, SOLUTION INTRAMUSCULAR; INTRAVENOUS
Status: DISCONTINUED | OUTPATIENT
Start: 2024-01-01 | End: 2024-01-01

## 2024-01-01 RX ORDER — BISACODYL 10 MG
10 SUPPOSITORY, RECTAL RECTAL
Status: DISCONTINUED | OUTPATIENT
Start: 2024-01-01 | End: 2024-01-01 | Stop reason: HOSPADM

## 2024-01-01 RX ORDER — MORPHINE SULFATE 4 MG/ML
4 INJECTION, SOLUTION INTRAMUSCULAR; INTRAVENOUS
Status: DISCONTINUED | OUTPATIENT
Start: 2024-01-01 | End: 2024-01-01 | Stop reason: HOSPADM

## 2024-01-01 RX ORDER — LORAZEPAM 2 MG/ML
1 INJECTION INTRAMUSCULAR EVERY 4 HOURS PRN
Status: DISCONTINUED | OUTPATIENT
Start: 2024-01-01 | End: 2024-01-01

## 2024-01-01 RX ORDER — HEPARIN SODIUM (PORCINE) LOCK FLUSH IV SOLN 100 UNIT/ML 100 UNIT/ML
5-10 SOLUTION INTRAVENOUS
Status: DISCONTINUED | OUTPATIENT
Start: 2024-01-01 | End: 2024-01-01 | Stop reason: HOSPADM

## 2024-01-01 RX ORDER — GLYCOPYRROLATE 0.2 MG/ML
0.2 INJECTION, SOLUTION INTRAMUSCULAR; INTRAVENOUS EVERY 4 HOURS PRN
Status: CANCELLED | OUTPATIENT
Start: 2024-01-01

## 2024-01-01 RX ORDER — LORAZEPAM 2 MG/ML
1 INJECTION INTRAMUSCULAR
Status: CANCELLED | OUTPATIENT
Start: 2024-01-01

## 2024-01-01 RX ORDER — LORAZEPAM 1 MG/1
1 TABLET ORAL
Status: DISCONTINUED | OUTPATIENT
Start: 2024-01-01 | End: 2024-01-01 | Stop reason: HOSPADM

## 2024-01-01 RX ORDER — ATROPINE SULFATE 10 MG/ML
2 SOLUTION/ DROPS OPHTHALMIC EVERY 4 HOURS PRN
Status: DISCONTINUED | OUTPATIENT
Start: 2024-01-01 | End: 2024-01-01 | Stop reason: HOSPADM

## 2024-01-01 RX ORDER — SENNOSIDES 8.6 MG
1 TABLET ORAL 2 TIMES DAILY PRN
Status: CANCELLED | OUTPATIENT
Start: 2024-01-01

## 2024-01-01 RX ORDER — MORPHINE SULFATE 4 MG/ML
5 INJECTION, SOLUTION INTRAMUSCULAR; INTRAVENOUS EVERY 4 HOURS PRN
Status: DISCONTINUED | OUTPATIENT
Start: 2024-01-01 | End: 2024-01-01

## 2024-01-01 RX ORDER — CARBOXYMETHYLCELLULOSE SODIUM 5 MG/ML
1-2 SOLUTION/ DROPS OPHTHALMIC
Status: CANCELLED | OUTPATIENT
Start: 2024-01-01

## 2024-01-01 RX ORDER — SODIUM CHLORIDE, SODIUM LACTATE, POTASSIUM CHLORIDE, CALCIUM CHLORIDE 600; 310; 30; 20 MG/100ML; MG/100ML; MG/100ML; MG/100ML
INJECTION, SOLUTION INTRAVENOUS CONTINUOUS
Status: DISCONTINUED | OUTPATIENT
Start: 2024-01-01 | End: 2024-01-01

## 2024-01-01 RX ORDER — BISACODYL 10 MG
10 SUPPOSITORY, RECTAL RECTAL
Status: CANCELLED | OUTPATIENT
Start: 2024-01-01

## 2024-01-01 RX ORDER — MORPHINE SULFATE 2 MG/ML
2 INJECTION, SOLUTION INTRAMUSCULAR; INTRAVENOUS
Status: DISCONTINUED | OUTPATIENT
Start: 2024-01-01 | End: 2024-01-01

## 2024-01-01 RX ORDER — AMOXICILLIN 250 MG
1 CAPSULE ORAL 2 TIMES DAILY PRN
Status: CANCELLED | OUTPATIENT
Start: 2024-01-01

## 2024-01-01 RX ORDER — ONDANSETRON 2 MG/ML
4 INJECTION INTRAMUSCULAR; INTRAVENOUS EVERY 6 HOURS PRN
Status: CANCELLED | OUTPATIENT
Start: 2024-01-01

## 2024-01-01 RX ORDER — LORAZEPAM 2 MG/ML
2 INJECTION INTRAMUSCULAR EVERY 4 HOURS
Status: DISCONTINUED | OUTPATIENT
Start: 2024-01-01 | End: 2024-01-01 | Stop reason: HOSPADM

## 2024-01-01 RX ORDER — LORAZEPAM 2 MG/ML
1 INJECTION INTRAMUSCULAR
Status: DISCONTINUED | OUTPATIENT
Start: 2024-01-01 | End: 2024-01-01 | Stop reason: HOSPADM

## 2024-01-01 RX ORDER — MORPHINE SULFATE 10 MG/ML
5 INJECTION, SOLUTION INTRAMUSCULAR; INTRAVENOUS
Status: DISCONTINUED | OUTPATIENT
Start: 2024-01-01 | End: 2024-01-01

## 2024-01-01 RX ORDER — MORPHINE SULFATE 20 MG/ML
15 SOLUTION ORAL EVERY 4 HOURS
Status: DISCONTINUED | OUTPATIENT
Start: 2024-01-01 | End: 2024-01-01

## 2024-01-01 RX ORDER — PROCHLORPERAZINE MALEATE 5 MG
5 TABLET ORAL EVERY 6 HOURS PRN
Status: DISCONTINUED | OUTPATIENT
Start: 2024-01-01 | End: 2024-01-01 | Stop reason: HOSPADM

## 2024-01-01 RX ORDER — ACETAMINOPHEN 325 MG/1
650 TABLET ORAL EVERY 4 HOURS PRN
Status: DISCONTINUED | OUTPATIENT
Start: 2024-01-01 | End: 2024-01-01 | Stop reason: HOSPADM

## 2024-01-01 RX ORDER — CALCIUM CARBONATE 500 MG/1
1000 TABLET, CHEWABLE ORAL 4 TIMES DAILY PRN
Status: CANCELLED | OUTPATIENT
Start: 2024-01-01

## 2024-01-01 RX ORDER — ACETAMINOPHEN 650 MG/1
650 SUPPOSITORY RECTAL EVERY 4 HOURS PRN
Status: DISCONTINUED | OUTPATIENT
Start: 2024-01-01 | End: 2024-01-01 | Stop reason: HOSPADM

## 2024-01-01 RX ORDER — HYDROMORPHONE HCL IN WATER/PF 6 MG/30 ML
0.4 PATIENT CONTROLLED ANALGESIA SYRINGE INTRAVENOUS
Status: DISCONTINUED | OUTPATIENT
Start: 2024-01-01 | End: 2024-01-01 | Stop reason: ALTCHOICE

## 2024-01-01 RX ORDER — LORAZEPAM 1 MG/1
1 TABLET ORAL
Status: DISCONTINUED | OUTPATIENT
Start: 2024-01-01 | End: 2024-01-01

## 2024-01-01 RX ORDER — NICOTINE 21 MG/24HR
1 PATCH, TRANSDERMAL 24 HOURS TRANSDERMAL DAILY
Status: CANCELLED | OUTPATIENT
Start: 2024-01-01

## 2024-01-01 RX ORDER — GLYCOPYRROLATE 0.2 MG/ML
0.2 INJECTION, SOLUTION INTRAMUSCULAR; INTRAVENOUS EVERY 4 HOURS PRN
Status: DISCONTINUED | OUTPATIENT
Start: 2024-01-01 | End: 2024-01-01

## 2024-01-01 RX ORDER — LORAZEPAM 2 MG/ML
1 INJECTION INTRAMUSCULAR EVERY 6 HOURS
Status: DISCONTINUED | OUTPATIENT
Start: 2024-01-01 | End: 2024-01-01

## 2024-01-01 RX ORDER — HEPARIN SODIUM,PORCINE 10 UNIT/ML
5-10 VIAL (ML) INTRAVENOUS EVERY 24 HOURS
Status: DISCONTINUED | OUTPATIENT
Start: 2024-01-01 | End: 2024-01-01 | Stop reason: HOSPADM

## 2024-01-01 RX ORDER — NALOXONE HYDROCHLORIDE 0.4 MG/ML
0.1 INJECTION, SOLUTION INTRAMUSCULAR; INTRAVENOUS; SUBCUTANEOUS
Status: CANCELLED | OUTPATIENT
Start: 2024-01-01

## 2024-01-01 RX ORDER — PROCHLORPERAZINE 25 MG
12.5 SUPPOSITORY, RECTAL RECTAL EVERY 12 HOURS PRN
Status: CANCELLED | OUTPATIENT
Start: 2024-01-01

## 2024-01-01 RX ORDER — IOPAMIDOL 755 MG/ML
69 INJECTION, SOLUTION INTRAVASCULAR ONCE
Status: COMPLETED | OUTPATIENT
Start: 2024-01-01 | End: 2024-01-01

## 2024-01-01 RX ORDER — NICOTINE 21 MG/24HR
1 PATCH, TRANSDERMAL 24 HOURS TRANSDERMAL
COMMUNITY

## 2024-01-01 RX ORDER — ACETAMINOPHEN 650 MG/1
650 SUPPOSITORY RECTAL EVERY 4 HOURS PRN
Status: CANCELLED | OUTPATIENT
Start: 2024-01-01

## 2024-01-01 RX ORDER — BISACODYL 10 MG
10 SUPPOSITORY, RECTAL RECTAL DAILY PRN
Status: CANCELLED | OUTPATIENT
Start: 2024-01-01

## 2024-01-01 RX ORDER — SENNOSIDES 8.6 MG
1 TABLET ORAL 2 TIMES DAILY PRN
Status: DISCONTINUED | OUTPATIENT
Start: 2024-01-01 | End: 2024-01-01 | Stop reason: HOSPADM

## 2024-01-01 RX ORDER — MORPHINE SULFATE 20 MG/ML
5 SOLUTION ORAL
Status: DISCONTINUED | OUTPATIENT
Start: 2024-01-01 | End: 2024-01-01

## 2024-01-01 RX ORDER — POLYETHYLENE GLYCOL 3350 17 G/17G
17 POWDER, FOR SOLUTION ORAL 2 TIMES DAILY PRN
Status: DISCONTINUED | OUTPATIENT
Start: 2024-01-01 | End: 2024-01-01 | Stop reason: HOSPADM

## 2024-01-01 RX ORDER — POLYETHYLENE GLYCOL 3350 17 G/17G
17 POWDER, FOR SOLUTION ORAL 2 TIMES DAILY PRN
Status: CANCELLED | OUTPATIENT
Start: 2024-01-01

## 2024-01-01 RX ORDER — HYDRALAZINE HYDROCHLORIDE 20 MG/ML
10 INJECTION INTRAMUSCULAR; INTRAVENOUS EVERY 4 HOURS PRN
Status: DISCONTINUED | OUTPATIENT
Start: 2024-01-01 | End: 2024-01-01

## 2024-01-01 RX ORDER — MINERAL OIL/HYDROPHIL PETROLAT
OINTMENT (GRAM) TOPICAL
Status: CANCELLED | OUTPATIENT
Start: 2024-01-01

## 2024-01-01 RX ORDER — CARBOXYMETHYLCELLULOSE SODIUM 5 MG/ML
1-2 SOLUTION/ DROPS OPHTHALMIC
Status: DISCONTINUED | OUTPATIENT
Start: 2024-01-01 | End: 2024-01-01 | Stop reason: HOSPADM

## 2024-01-01 RX ORDER — MORPHINE SULFATE 20 MG/ML
5 SOLUTION ORAL
Status: CANCELLED | OUTPATIENT
Start: 2024-01-01

## 2024-01-01 RX ORDER — LORAZEPAM 2 MG/ML
1 CONCENTRATE ORAL EVERY 4 HOURS
Status: DISCONTINUED | OUTPATIENT
Start: 2024-01-01 | End: 2024-01-01

## 2024-01-01 RX ORDER — OXYCODONE HYDROCHLORIDE 5 MG/1
5 TABLET ORAL EVERY 4 HOURS PRN
Status: DISCONTINUED | OUTPATIENT
Start: 2024-01-01 | End: 2024-01-01 | Stop reason: ALTCHOICE

## 2024-01-01 RX ORDER — LORAZEPAM 2 MG/ML
2 INJECTION INTRAMUSCULAR EVERY 6 HOURS
Status: DISCONTINUED | OUTPATIENT
Start: 2024-01-01 | End: 2024-01-01

## 2024-01-01 RX ORDER — HEPARIN SODIUM,PORCINE 10 UNIT/ML
5-10 VIAL (ML) INTRAVENOUS
Status: DISCONTINUED | OUTPATIENT
Start: 2024-01-01 | End: 2024-01-01 | Stop reason: HOSPADM

## 2024-01-01 RX ORDER — MORPHINE SULFATE 2 MG/ML
1 INJECTION, SOLUTION INTRAMUSCULAR; INTRAVENOUS
Status: CANCELLED | OUTPATIENT
Start: 2024-01-01

## 2024-01-01 RX ORDER — OLANZAPINE 5 MG/1
10 TABLET, ORALLY DISINTEGRATING ORAL EVERY 6 HOURS
Status: DISCONTINUED | OUTPATIENT
Start: 2024-01-01 | End: 2024-01-01

## 2024-01-01 RX ORDER — IOPAMIDOL 755 MG/ML
117 INJECTION, SOLUTION INTRAVASCULAR ONCE
Status: COMPLETED | OUTPATIENT
Start: 2024-01-01 | End: 2024-01-01

## 2024-01-01 RX ORDER — LIDOCAINE 40 MG/G
CREAM TOPICAL
Status: CANCELLED | OUTPATIENT
Start: 2024-01-01

## 2024-01-01 RX ORDER — MORPHINE SULFATE 2 MG/ML
1 INJECTION, SOLUTION INTRAMUSCULAR; INTRAVENOUS
Status: DISCONTINUED | OUTPATIENT
Start: 2024-01-01 | End: 2024-01-01 | Stop reason: HOSPADM

## 2024-01-01 RX ORDER — AMOXICILLIN 250 MG
2 CAPSULE ORAL 2 TIMES DAILY
Status: DISCONTINUED | OUTPATIENT
Start: 2024-01-01 | End: 2024-01-01

## 2024-01-01 RX ORDER — LORAZEPAM 2 MG/ML
1 CONCENTRATE ORAL EVERY 6 HOURS
Status: DISCONTINUED | OUTPATIENT
Start: 2024-01-01 | End: 2024-01-01

## 2024-01-01 RX ORDER — LORAZEPAM 2 MG/ML
2 INJECTION INTRAMUSCULAR
Status: DISCONTINUED | OUTPATIENT
Start: 2024-01-01 | End: 2024-01-01 | Stop reason: HOSPADM

## 2024-01-01 RX ORDER — SODIUM CHLORIDE 9 MG/ML
INJECTION, SOLUTION INTRAVENOUS CONTINUOUS
Status: DISCONTINUED | OUTPATIENT
Start: 2024-01-01 | End: 2024-01-01 | Stop reason: HOSPADM

## 2024-01-01 RX ORDER — ONDANSETRON 4 MG/1
4 TABLET, ORALLY DISINTEGRATING ORAL EVERY 6 HOURS PRN
Status: CANCELLED | OUTPATIENT
Start: 2024-01-01

## 2024-01-01 RX ORDER — MORPHINE SULFATE 20 MG/ML
5 SOLUTION ORAL
Status: DISCONTINUED | OUTPATIENT
Start: 2024-01-01 | End: 2024-01-01 | Stop reason: HOSPADM

## 2024-01-01 RX ORDER — LORAZEPAM 2 MG/ML
1 CONCENTRATE ORAL EVERY 4 HOURS PRN
Status: DISCONTINUED | OUTPATIENT
Start: 2024-01-01 | End: 2024-01-01

## 2024-01-01 RX ORDER — PROCHLORPERAZINE MALEATE 5 MG
5 TABLET ORAL EVERY 6 HOURS PRN
Status: CANCELLED | OUTPATIENT
Start: 2024-01-01

## 2024-01-01 RX ADMIN — Medication 5 ML: at 03:50

## 2024-01-01 RX ADMIN — LEVETIRACETAM 750 MG: 100 INJECTION, SOLUTION INTRAVENOUS at 20:52

## 2024-01-01 RX ADMIN — LEVETIRACETAM 750 MG: 100 INJECTION, SOLUTION INTRAVENOUS at 20:38

## 2024-01-01 RX ADMIN — MORPHINE SULFATE 4 MG: 2 INJECTION, SOLUTION INTRAMUSCULAR; INTRAVENOUS at 00:51

## 2024-01-01 RX ADMIN — LORAZEPAM 1 MG: 2 INJECTION INTRAMUSCULAR; INTRAVENOUS at 10:17

## 2024-01-01 RX ADMIN — ATROPINE SULFATE 2 DROP: 10 SOLUTION/ DROPS OPHTHALMIC at 17:15

## 2024-01-01 RX ADMIN — LORAZEPAM 1 MG: 2 INJECTION INTRAMUSCULAR; INTRAVENOUS at 05:22

## 2024-01-01 RX ADMIN — LORAZEPAM 1 MG: 2 INJECTION INTRAMUSCULAR; INTRAVENOUS at 05:58

## 2024-01-01 RX ADMIN — Medication 5 ML: at 08:48

## 2024-01-01 RX ADMIN — MORPHINE SULFATE 10 MG: 100 SOLUTION ORAL at 10:27

## 2024-01-01 RX ADMIN — LORAZEPAM 1 MG: 2 INJECTION INTRAMUSCULAR; INTRAVENOUS at 14:24

## 2024-01-01 RX ADMIN — MORPHINE SULFATE 4 MG: 4 INJECTION, SOLUTION INTRAMUSCULAR; INTRAVENOUS at 12:05

## 2024-01-01 RX ADMIN — LORAZEPAM 2 MG: 2 INJECTION INTRAMUSCULAR; INTRAVENOUS at 03:16

## 2024-01-01 RX ADMIN — Medication 5 ML: at 22:01

## 2024-01-01 RX ADMIN — Medication 5 ML: at 14:37

## 2024-01-01 RX ADMIN — MORPHINE SULFATE 5 MG: 4 INJECTION, SOLUTION INTRAMUSCULAR; INTRAVENOUS at 15:29

## 2024-01-01 RX ADMIN — LORAZEPAM 2 MG: 2 INJECTION INTRAMUSCULAR; INTRAVENOUS at 08:10

## 2024-01-01 RX ADMIN — MORPHINE SULFATE 4 MG: 4 INJECTION, SOLUTION INTRAMUSCULAR; INTRAVENOUS at 17:12

## 2024-01-01 RX ADMIN — NICOTINE 1 PATCH: 14 PATCH, EXTENDED RELEASE TRANSDERMAL at 08:34

## 2024-01-01 RX ADMIN — SODIUM CHLORIDE, POTASSIUM CHLORIDE, SODIUM LACTATE AND CALCIUM CHLORIDE: 600; 310; 30; 20 INJECTION, SOLUTION INTRAVENOUS at 01:48

## 2024-01-01 RX ADMIN — WATER 10 ML: 1 INJECTION INTRAMUSCULAR; INTRAVENOUS; SUBCUTANEOUS at 22:42

## 2024-01-01 RX ADMIN — LORAZEPAM 1 MG: 2 INJECTION INTRAMUSCULAR; INTRAVENOUS at 19:20

## 2024-01-01 RX ADMIN — NICOTINE 1 PATCH: 14 PATCH, EXTENDED RELEASE TRANSDERMAL at 08:21

## 2024-01-01 RX ADMIN — MORPHINE SULFATE 5 MG: 4 INJECTION, SOLUTION INTRAMUSCULAR; INTRAVENOUS at 00:54

## 2024-01-01 RX ADMIN — MORPHINE SULFATE 3 MG: 2 INJECTION, SOLUTION INTRAMUSCULAR; INTRAVENOUS at 18:27

## 2024-01-01 RX ADMIN — MORPHINE SULFATE 15 MG: 100 SOLUTION ORAL at 02:09

## 2024-01-01 RX ADMIN — SODIUM CHLORIDE: 9 INJECTION, SOLUTION INTRAVENOUS at 18:12

## 2024-01-01 RX ADMIN — LEVETIRACETAM 750 MG: 100 INJECTION, SOLUTION INTRAVENOUS at 08:33

## 2024-01-01 RX ADMIN — MORPHINE SULFATE 4 MG: 4 INJECTION, SOLUTION INTRAMUSCULAR; INTRAVENOUS at 05:42

## 2024-01-01 RX ADMIN — MORPHINE SULFATE 10 MG: 100 SOLUTION ORAL at 14:16

## 2024-01-01 RX ADMIN — MORPHINE SULFATE 5 MG: 100 SOLUTION ORAL at 01:29

## 2024-01-01 RX ADMIN — MORPHINE SULFATE 5 MG: 4 INJECTION, SOLUTION INTRAMUSCULAR; INTRAVENOUS at 03:05

## 2024-01-01 RX ADMIN — MORPHINE SULFATE 10 MG: 100 SOLUTION ORAL at 18:22

## 2024-01-01 RX ADMIN — MORPHINE SULFATE 4 MG: 4 INJECTION, SOLUTION INTRAMUSCULAR; INTRAVENOUS at 04:41

## 2024-01-01 RX ADMIN — MORPHINE SULFATE 15 MG: 100 SOLUTION ORAL at 18:29

## 2024-01-01 RX ADMIN — GLYCOPYRROLATE 0.4 MG: 0.2 INJECTION, SOLUTION INTRAMUSCULAR; INTRAVENOUS at 16:22

## 2024-01-01 RX ADMIN — LEVETIRACETAM 750 MG: 100 INJECTION, SOLUTION INTRAVENOUS at 09:08

## 2024-01-01 RX ADMIN — NICOTINE 1 PATCH: 14 PATCH, EXTENDED RELEASE TRANSDERMAL at 09:25

## 2024-01-01 RX ADMIN — LEVETIRACETAM 750 MG: 100 INJECTION, SOLUTION INTRAVENOUS at 09:17

## 2024-01-01 RX ADMIN — MORPHINE SULFATE 4 MG: 4 INJECTION, SOLUTION INTRAMUSCULAR; INTRAVENOUS at 19:36

## 2024-01-01 RX ADMIN — MORPHINE SULFATE 4 MG: 2 INJECTION, SOLUTION INTRAMUSCULAR; INTRAVENOUS at 17:18

## 2024-01-01 RX ADMIN — NICOTINE 1 PATCH: 14 PATCH, EXTENDED RELEASE TRANSDERMAL at 11:19

## 2024-01-01 RX ADMIN — MORPHINE SULFATE 4 MG: 4 INJECTION, SOLUTION INTRAMUSCULAR; INTRAVENOUS at 18:47

## 2024-01-01 RX ADMIN — LEVETIRACETAM 2000 MG: 100 INJECTION, SOLUTION INTRAVENOUS at 19:00

## 2024-01-01 RX ADMIN — LORAZEPAM 1 MG: 2 INJECTION INTRAMUSCULAR; INTRAVENOUS at 00:54

## 2024-01-01 RX ADMIN — MORPHINE SULFATE 4 MG: 4 INJECTION, SOLUTION INTRAMUSCULAR; INTRAVENOUS at 12:47

## 2024-01-01 RX ADMIN — LEVETIRACETAM 750 MG: 100 INJECTION, SOLUTION INTRAVENOUS at 21:10

## 2024-01-01 RX ADMIN — MORPHINE SULFATE 4 MG: 2 INJECTION, SOLUTION INTRAMUSCULAR; INTRAVENOUS at 18:34

## 2024-01-01 RX ADMIN — HYDROMORPHONE HYDROCHLORIDE 0.4 MG: 0.2 INJECTION, SOLUTION INTRAMUSCULAR; INTRAVENOUS; SUBCUTANEOUS at 13:10

## 2024-01-01 RX ADMIN — MORPHINE SULFATE 10 MG: 100 SOLUTION ORAL at 10:08

## 2024-01-01 RX ADMIN — MORPHINE SULFATE 4 MG: 2 INJECTION, SOLUTION INTRAMUSCULAR; INTRAVENOUS at 02:55

## 2024-01-01 RX ADMIN — LORAZEPAM 2 MG: 2 INJECTION INTRAMUSCULAR; INTRAVENOUS at 04:12

## 2024-01-01 RX ADMIN — LEVETIRACETAM 750 MG: 100 INJECTION, SOLUTION INTRAVENOUS at 20:55

## 2024-01-01 RX ADMIN — SODIUM CHLORIDE: 9 INJECTION, SOLUTION INTRAVENOUS at 16:00

## 2024-01-01 RX ADMIN — LORAZEPAM 1 MG: 1 TABLET ORAL at 14:53

## 2024-01-01 RX ADMIN — LORAZEPAM 1 MG: 2 INJECTION INTRAMUSCULAR; INTRAVENOUS at 21:51

## 2024-01-01 RX ADMIN — MORPHINE SULFATE 4 MG: 4 INJECTION, SOLUTION INTRAMUSCULAR; INTRAVENOUS at 02:11

## 2024-01-01 RX ADMIN — NICOTINE 1 PATCH: 14 PATCH, EXTENDED RELEASE TRANSDERMAL at 16:39

## 2024-01-01 RX ADMIN — MORPHINE SULFATE 10 MG: 100 SOLUTION ORAL at 13:19

## 2024-01-01 RX ADMIN — Medication 5 ML: at 08:22

## 2024-01-01 RX ADMIN — LORAZEPAM 1 MG: 2 CONCENTRATE ORAL at 20:24

## 2024-01-01 RX ADMIN — MORPHINE SULFATE 10 MG: 100 SOLUTION ORAL at 15:30

## 2024-01-01 RX ADMIN — MORPHINE SULFATE 4 MG: 4 INJECTION, SOLUTION INTRAMUSCULAR; INTRAVENOUS at 14:01

## 2024-01-01 RX ADMIN — OLANZAPINE 10 MG: 5 TABLET, ORALLY DISINTEGRATING ORAL at 11:19

## 2024-01-01 RX ADMIN — LORAZEPAM 2 MG: 2 INJECTION INTRAMUSCULAR; INTRAVENOUS at 14:32

## 2024-01-01 RX ADMIN — MORPHINE SULFATE 15 MG: 100 SOLUTION ORAL at 23:43

## 2024-01-01 RX ADMIN — MORPHINE SULFATE 4 MG: 4 INJECTION, SOLUTION INTRAMUSCULAR; INTRAVENOUS at 16:59

## 2024-01-01 RX ADMIN — MORPHINE SULFATE 4 MG: 4 INJECTION, SOLUTION INTRAMUSCULAR; INTRAVENOUS at 00:27

## 2024-01-01 RX ADMIN — MORPHINE SULFATE 10 MG: 100 SOLUTION ORAL at 00:56

## 2024-01-01 RX ADMIN — MORPHINE SULFATE 4 MG: 4 INJECTION, SOLUTION INTRAMUSCULAR; INTRAVENOUS at 02:56

## 2024-01-01 RX ADMIN — LEVETIRACETAM 750 MG: 100 INJECTION, SOLUTION INTRAVENOUS at 20:59

## 2024-01-01 RX ADMIN — MORPHINE SULFATE 4 MG: 4 INJECTION, SOLUTION INTRAMUSCULAR; INTRAVENOUS at 10:58

## 2024-01-01 RX ADMIN — MORPHINE SULFATE 10 MG: 100 SOLUTION ORAL at 06:43

## 2024-01-01 RX ADMIN — MORPHINE SULFATE 10 MG: 100 SOLUTION ORAL at 22:53

## 2024-01-01 RX ADMIN — MORPHINE SULFATE 5 MG: 100 SOLUTION ORAL at 01:49

## 2024-01-01 RX ADMIN — MORPHINE SULFATE 10 MG: 100 SOLUTION ORAL at 10:21

## 2024-01-01 RX ADMIN — MORPHINE SULFATE 10 MG: 100 SOLUTION ORAL at 17:54

## 2024-01-01 RX ADMIN — IOPAMIDOL 69 ML: 755 INJECTION, SOLUTION INTRAVENOUS at 07:28

## 2024-01-01 RX ADMIN — SODIUM CHLORIDE 60 ML: 9 INJECTION, SOLUTION INTRAVENOUS at 07:27

## 2024-01-01 RX ADMIN — MORPHINE SULFATE 4 MG: 2 INJECTION, SOLUTION INTRAMUSCULAR; INTRAVENOUS at 06:59

## 2024-01-01 RX ADMIN — Medication 5 ML: at 21:23

## 2024-01-01 RX ADMIN — ATROPINE SULFATE 2 DROP: 10 SOLUTION/ DROPS OPHTHALMIC at 11:06

## 2024-01-01 RX ADMIN — GLYCOPYRROLATE 0.2 MG: 0.2 INJECTION, SOLUTION INTRAMUSCULAR; INTRAVENOUS at 09:51

## 2024-01-01 RX ADMIN — LORAZEPAM 1 MG: 2 INJECTION INTRAMUSCULAR; INTRAVENOUS at 03:45

## 2024-01-01 RX ADMIN — MORPHINE SULFATE 3 MG: 2 INJECTION, SOLUTION INTRAMUSCULAR; INTRAVENOUS at 06:59

## 2024-01-01 RX ADMIN — MORPHINE SULFATE 10 MG: 100 SOLUTION ORAL at 03:10

## 2024-01-01 RX ADMIN — LORAZEPAM 1 MG: 2 INJECTION INTRAMUSCULAR; INTRAVENOUS at 01:36

## 2024-01-01 RX ADMIN — LORAZEPAM 1 MG: 2 INJECTION INTRAMUSCULAR; INTRAVENOUS at 12:44

## 2024-01-01 RX ADMIN — MORPHINE SULFATE 5 MG: 4 INJECTION, SOLUTION INTRAMUSCULAR; INTRAVENOUS at 08:20

## 2024-01-01 RX ADMIN — LORAZEPAM 1 MG: 2 INJECTION INTRAMUSCULAR; INTRAVENOUS at 16:50

## 2024-01-01 RX ADMIN — MORPHINE SULFATE 5 MG: 4 INJECTION, SOLUTION INTRAMUSCULAR; INTRAVENOUS at 20:09

## 2024-01-01 RX ADMIN — ATROPINE SULFATE 2 DROP: 10 SOLUTION/ DROPS OPHTHALMIC at 23:45

## 2024-01-01 RX ADMIN — LEVETIRACETAM 750 MG: 100 INJECTION, SOLUTION INTRAVENOUS at 08:48

## 2024-01-01 RX ADMIN — LORAZEPAM 1 MG: 2 INJECTION INTRAMUSCULAR; INTRAVENOUS at 17:07

## 2024-01-01 RX ADMIN — MORPHINE SULFATE 4 MG: 4 INJECTION, SOLUTION INTRAMUSCULAR; INTRAVENOUS at 06:30

## 2024-01-01 RX ADMIN — MORPHINE SULFATE 4 MG/HR: 10 INJECTION INTRAVENOUS at 15:58

## 2024-01-01 RX ADMIN — LORAZEPAM 1 MG: 2 INJECTION INTRAMUSCULAR; INTRAVENOUS at 13:23

## 2024-01-01 RX ADMIN — LORAZEPAM 1 MG: 2 INJECTION INTRAMUSCULAR; INTRAVENOUS at 20:25

## 2024-01-01 RX ADMIN — MORPHINE SULFATE 4 MG: 4 INJECTION, SOLUTION INTRAMUSCULAR; INTRAVENOUS at 16:47

## 2024-01-01 RX ADMIN — LORAZEPAM 1 MG: 2 LIQUID ORAL at 09:18

## 2024-01-01 RX ADMIN — MORPHINE SULFATE 2 MG: 2 INJECTION, SOLUTION INTRAMUSCULAR; INTRAVENOUS at 20:42

## 2024-01-01 RX ADMIN — NICOTINE 1 PATCH: 14 PATCH, EXTENDED RELEASE TRANSDERMAL at 08:17

## 2024-01-01 RX ADMIN — LEVETIRACETAM 750 MG: 100 INJECTION, SOLUTION INTRAVENOUS at 11:15

## 2024-01-01 RX ADMIN — MORPHINE SULFATE 5 MG: 4 INJECTION, SOLUTION INTRAMUSCULAR; INTRAVENOUS at 16:25

## 2024-01-01 RX ADMIN — LORAZEPAM 1 MG: 2 INJECTION INTRAMUSCULAR; INTRAVENOUS at 12:47

## 2024-01-01 RX ADMIN — LORAZEPAM 1 MG: 2 INJECTION INTRAMUSCULAR; INTRAVENOUS at 04:41

## 2024-01-01 RX ADMIN — MORPHINE SULFATE 5 MG: 4 INJECTION, SOLUTION INTRAMUSCULAR; INTRAVENOUS at 22:07

## 2024-01-01 RX ADMIN — NICOTINE 1 PATCH: 14 PATCH, EXTENDED RELEASE TRANSDERMAL at 10:13

## 2024-01-01 RX ADMIN — Medication 5 ML: at 18:46

## 2024-01-01 RX ADMIN — IOPAMIDOL 90 ML: 755 INJECTION, SOLUTION INTRAVENOUS at 15:20

## 2024-01-01 RX ADMIN — MORPHINE SULFATE 4 MG: 4 INJECTION, SOLUTION INTRAMUSCULAR; INTRAVENOUS at 07:55

## 2024-01-01 RX ADMIN — MORPHINE SULFATE 4 MG: 4 INJECTION, SOLUTION INTRAMUSCULAR; INTRAVENOUS at 22:00

## 2024-01-01 RX ADMIN — MORPHINE SULFATE 15 MG: 100 SOLUTION ORAL at 22:08

## 2024-01-01 RX ADMIN — LORAZEPAM 1 MG: 2 INJECTION INTRAMUSCULAR; INTRAVENOUS at 11:15

## 2024-01-01 RX ADMIN — LEVETIRACETAM 750 MG: 100 INJECTION, SOLUTION INTRAVENOUS at 20:25

## 2024-01-01 RX ADMIN — LORAZEPAM 1 MG: 2 INJECTION INTRAMUSCULAR; INTRAVENOUS at 13:30

## 2024-01-01 RX ADMIN — MORPHINE SULFATE 10 MG: 100 SOLUTION ORAL at 08:37

## 2024-01-01 RX ADMIN — MORPHINE SULFATE 10 MG: 100 SOLUTION ORAL at 18:26

## 2024-01-01 RX ADMIN — Medication 5 ML: at 19:57

## 2024-01-01 RX ADMIN — LEVETIRACETAM 750 MG: 100 INJECTION, SOLUTION INTRAVENOUS at 09:37

## 2024-01-01 RX ADMIN — LORAZEPAM 1 MG: 2 INJECTION INTRAMUSCULAR; INTRAVENOUS at 04:37

## 2024-01-01 RX ADMIN — LEVETIRACETAM 750 MG: 100 INJECTION, SOLUTION INTRAVENOUS at 21:18

## 2024-01-01 RX ADMIN — MORPHINE SULFATE 3 MG: 2 INJECTION, SOLUTION INTRAMUSCULAR; INTRAVENOUS at 00:44

## 2024-01-01 RX ADMIN — ATROPINE SULFATE 2 DROP: 10 SOLUTION/ DROPS OPHTHALMIC at 17:12

## 2024-01-01 RX ADMIN — OLANZAPINE 10 MG: 5 TABLET, ORALLY DISINTEGRATING ORAL at 16:23

## 2024-01-01 RX ADMIN — LORAZEPAM 1 MG: 2 INJECTION INTRAMUSCULAR; INTRAVENOUS at 18:02

## 2024-01-01 RX ADMIN — MORPHINE SULFATE 4 MG: 4 INJECTION, SOLUTION INTRAMUSCULAR; INTRAVENOUS at 19:57

## 2024-01-01 RX ADMIN — SODIUM CHLORIDE: 9 INJECTION, SOLUTION INTRAVENOUS at 21:17

## 2024-01-01 RX ADMIN — LORAZEPAM 1 MG: 2 INJECTION INTRAMUSCULAR; INTRAVENOUS at 18:19

## 2024-01-01 RX ADMIN — Medication 5 ML: at 00:27

## 2024-01-01 RX ADMIN — NICOTINE 1 PATCH: 14 PATCH, EXTENDED RELEASE TRANSDERMAL at 08:26

## 2024-01-01 RX ADMIN — NICOTINE 1 PATCH: 14 PATCH, EXTENDED RELEASE TRANSDERMAL at 12:27

## 2024-01-01 RX ADMIN — LORAZEPAM 2 MG: 2 INJECTION INTRAMUSCULAR; INTRAVENOUS at 01:02

## 2024-01-01 RX ADMIN — LEVETIRACETAM 750 MG: 100 INJECTION, SOLUTION INTRAVENOUS at 08:10

## 2024-01-01 RX ADMIN — GLYCOPYRROLATE 0.4 MG: 0.2 INJECTION, SOLUTION INTRAMUSCULAR; INTRAVENOUS at 12:48

## 2024-01-01 RX ADMIN — ATROPINE SULFATE 2 DROP: 10 SOLUTION/ DROPS OPHTHALMIC at 08:21

## 2024-01-01 RX ADMIN — MORPHINE SULFATE 4 MG/HR: 10 INJECTION INTRAVENOUS at 18:34

## 2024-01-01 RX ADMIN — MORPHINE SULFATE 4 MG: 4 INJECTION, SOLUTION INTRAMUSCULAR; INTRAVENOUS at 14:44

## 2024-01-01 RX ADMIN — SODIUM CHLORIDE 100 ML: 9 INJECTION, SOLUTION INTRAVENOUS at 18:19

## 2024-01-01 RX ADMIN — GLYCOPYRROLATE 0.4 MG: 0.2 INJECTION, SOLUTION INTRAMUSCULAR; INTRAVENOUS at 17:42

## 2024-01-01 RX ADMIN — MORPHINE SULFATE 4 MG: 4 INJECTION, SOLUTION INTRAMUSCULAR; INTRAVENOUS at 12:21

## 2024-01-01 RX ADMIN — MORPHINE SULFATE 4 MG: 4 INJECTION, SOLUTION INTRAMUSCULAR; INTRAVENOUS at 03:45

## 2024-01-01 RX ADMIN — Medication 5 ML: at 22:49

## 2024-01-01 RX ADMIN — GLYCOPYRROLATE 0.2 MG: 0.2 INJECTION, SOLUTION INTRAMUSCULAR; INTRAVENOUS at 16:46

## 2024-01-01 RX ADMIN — NICOTINE 1 PATCH: 14 PATCH, EXTENDED RELEASE TRANSDERMAL at 08:31

## 2024-01-01 RX ADMIN — MORPHINE SULFATE 4 MG: 4 INJECTION, SOLUTION INTRAMUSCULAR; INTRAVENOUS at 10:01

## 2024-01-01 RX ADMIN — MORPHINE SULFATE 10 MG: 100 SOLUTION ORAL at 13:43

## 2024-01-01 RX ADMIN — MORPHINE SULFATE 5 MG: 100 SOLUTION ORAL at 21:43

## 2024-01-01 RX ADMIN — Medication 5 ML: at 04:41

## 2024-01-01 RX ADMIN — MORPHINE SULFATE 15 MG: 100 SOLUTION ORAL at 16:23

## 2024-01-01 RX ADMIN — LORAZEPAM 1 MG: 2 INJECTION INTRAMUSCULAR; INTRAVENOUS at 08:48

## 2024-01-01 RX ADMIN — MORPHINE SULFATE 5 MG/HR: 10 INJECTION INTRAVENOUS at 12:29

## 2024-01-01 RX ADMIN — WATER 10 ML: 1 INJECTION INTRAMUSCULAR; INTRAVENOUS; SUBCUTANEOUS at 01:32

## 2024-01-01 RX ADMIN — LORAZEPAM 1 MG: 2 INJECTION INTRAMUSCULAR; INTRAVENOUS at 08:13

## 2024-01-01 RX ADMIN — MORPHINE SULFATE 4 MG: 4 INJECTION, SOLUTION INTRAMUSCULAR; INTRAVENOUS at 20:25

## 2024-01-01 RX ADMIN — MORPHINE SULFATE 4 MG: 4 INJECTION, SOLUTION INTRAMUSCULAR; INTRAVENOUS at 14:37

## 2024-01-01 RX ADMIN — LORAZEPAM 2 MG: 2 INJECTION INTRAMUSCULAR; INTRAVENOUS at 20:32

## 2024-01-01 RX ADMIN — LORAZEPAM 2 MG: 2 INJECTION INTRAMUSCULAR; INTRAVENOUS at 10:49

## 2024-01-01 RX ADMIN — MORPHINE SULFATE 5 MG: 4 INJECTION, SOLUTION INTRAMUSCULAR; INTRAVENOUS at 18:24

## 2024-01-01 RX ADMIN — ATROPINE SULFATE 2 DROP: 10 SOLUTION/ DROPS OPHTHALMIC at 02:57

## 2024-01-01 RX ADMIN — ATROPINE SULFATE 2 DROP: 10 SOLUTION/ DROPS OPHTHALMIC at 00:30

## 2024-01-01 RX ADMIN — LEVETIRACETAM 750 MG: 100 INJECTION, SOLUTION INTRAVENOUS at 10:49

## 2024-01-01 RX ADMIN — MORPHINE SULFATE 15 MG: 100 SOLUTION ORAL at 13:17

## 2024-01-01 RX ADMIN — GLYCOPYRROLATE 0.4 MG: 0.2 INJECTION, SOLUTION INTRAMUSCULAR; INTRAVENOUS at 04:16

## 2024-01-01 RX ADMIN — LORAZEPAM 1 MG: 2 INJECTION INTRAMUSCULAR; INTRAVENOUS at 00:46

## 2024-01-01 RX ADMIN — LORAZEPAM 2 MG: 2 INJECTION INTRAMUSCULAR; INTRAVENOUS at 12:10

## 2024-01-01 RX ADMIN — MORPHINE SULFATE 4 MG: 2 INJECTION, SOLUTION INTRAMUSCULAR; INTRAVENOUS at 20:34

## 2024-01-01 RX ADMIN — MORPHINE SULFATE 3 MG: 2 INJECTION, SOLUTION INTRAMUSCULAR; INTRAVENOUS at 11:25

## 2024-01-01 RX ADMIN — MORPHINE SULFATE 4 MG: 4 INJECTION, SOLUTION INTRAMUSCULAR; INTRAVENOUS at 12:28

## 2024-01-01 RX ADMIN — MORPHINE SULFATE 5 MG: 4 INJECTION, SOLUTION INTRAMUSCULAR; INTRAVENOUS at 14:10

## 2024-01-01 RX ADMIN — LORAZEPAM 1 MG: 2 INJECTION INTRAMUSCULAR; INTRAVENOUS at 13:17

## 2024-01-01 RX ADMIN — LEVETIRACETAM 750 MG: 100 INJECTION, SOLUTION INTRAVENOUS at 21:53

## 2024-01-01 RX ADMIN — MORPHINE SULFATE 4 MG: 4 INJECTION, SOLUTION INTRAMUSCULAR; INTRAVENOUS at 22:48

## 2024-01-01 RX ADMIN — MORPHINE SULFATE 4 MG: 4 INJECTION, SOLUTION INTRAMUSCULAR; INTRAVENOUS at 20:56

## 2024-01-01 RX ADMIN — ATROPINE SULFATE 2 DROP: 10 SOLUTION/ DROPS OPHTHALMIC at 14:08

## 2024-01-01 RX ADMIN — SODIUM CHLORIDE 40 ML: 9 INJECTION, SOLUTION INTRAVENOUS at 15:20

## 2024-01-01 RX ADMIN — ALTEPLASE 2 MG: 2.2 INJECTION, POWDER, LYOPHILIZED, FOR SOLUTION INTRAVENOUS at 01:30

## 2024-01-01 RX ADMIN — Medication 5 ML: at 06:23

## 2024-01-01 RX ADMIN — GLYCOPYRROLATE 0.2 MG: 0.2 INJECTION, SOLUTION INTRAMUSCULAR; INTRAVENOUS at 08:20

## 2024-01-01 RX ADMIN — MORPHINE SULFATE 3 MG: 2 INJECTION, SOLUTION INTRAMUSCULAR; INTRAVENOUS at 22:46

## 2024-01-01 RX ADMIN — MORPHINE SULFATE 3 MG: 2 INJECTION, SOLUTION INTRAMUSCULAR; INTRAVENOUS at 05:14

## 2024-01-01 RX ADMIN — LORAZEPAM 1 MG: 2 INJECTION INTRAMUSCULAR; INTRAVENOUS at 04:17

## 2024-01-01 RX ADMIN — MORPHINE SULFATE 10 MG: 100 SOLUTION ORAL at 16:20

## 2024-01-01 RX ADMIN — LORAZEPAM 1 MG: 2 CONCENTRATE ORAL at 17:15

## 2024-01-01 RX ADMIN — LEVETIRACETAM 750 MG: 100 INJECTION, SOLUTION INTRAVENOUS at 08:05

## 2024-01-01 RX ADMIN — LORAZEPAM 2 MG: 2 INJECTION INTRAMUSCULAR; INTRAVENOUS at 18:18

## 2024-01-01 RX ADMIN — ATROPINE SULFATE 2 DROP: 10 SOLUTION/ DROPS OPHTHALMIC at 12:31

## 2024-01-01 RX ADMIN — LORAZEPAM 2 MG: 2 INJECTION INTRAMUSCULAR; INTRAVENOUS at 16:34

## 2024-01-01 RX ADMIN — GLYCOPYRROLATE 0.2 MG: 0.2 INJECTION, SOLUTION INTRAMUSCULAR; INTRAVENOUS at 17:12

## 2024-01-01 RX ADMIN — SODIUM CHLORIDE, POTASSIUM CHLORIDE, SODIUM LACTATE AND CALCIUM CHLORIDE: 600; 310; 30; 20 INJECTION, SOLUTION INTRAVENOUS at 15:50

## 2024-01-01 RX ADMIN — LORAZEPAM 1 MG: 2 INJECTION INTRAMUSCULAR; INTRAVENOUS at 08:32

## 2024-01-01 RX ADMIN — LORAZEPAM 1 MG: 2 INJECTION INTRAMUSCULAR; INTRAVENOUS at 05:43

## 2024-01-01 RX ADMIN — LORAZEPAM 1 MG: 2 INJECTION INTRAMUSCULAR; INTRAVENOUS at 08:21

## 2024-01-01 RX ADMIN — MORPHINE SULFATE 10 MG: 100 SOLUTION ORAL at 21:36

## 2024-01-01 RX ADMIN — ONDANSETRON 4 MG: 2 INJECTION INTRAMUSCULAR; INTRAVENOUS at 18:13

## 2024-01-01 RX ADMIN — LORAZEPAM 1 MG: 2 INJECTION INTRAMUSCULAR; INTRAVENOUS at 08:24

## 2024-01-01 RX ADMIN — HYDROMORPHONE HYDROCHLORIDE 0.4 MG: 0.2 INJECTION, SOLUTION INTRAMUSCULAR; INTRAVENOUS; SUBCUTANEOUS at 14:31

## 2024-01-01 RX ADMIN — MORPHINE SULFATE 4 MG: 4 INJECTION, SOLUTION INTRAMUSCULAR; INTRAVENOUS at 10:37

## 2024-01-01 RX ADMIN — LORAZEPAM 1 MG: 2 INJECTION INTRAMUSCULAR; INTRAVENOUS at 21:10

## 2024-01-01 RX ADMIN — SODIUM CHLORIDE: 9 INJECTION, SOLUTION INTRAVENOUS at 18:33

## 2024-01-01 RX ADMIN — Medication 5 ML: at 16:56

## 2024-01-01 RX ADMIN — MORPHINE SULFATE 4 MG: 2 INJECTION, SOLUTION INTRAMUSCULAR; INTRAVENOUS at 10:17

## 2024-01-01 RX ADMIN — NICOTINE 1 PATCH: 14 PATCH, EXTENDED RELEASE TRANSDERMAL at 08:09

## 2024-01-01 RX ADMIN — LEVETIRACETAM 750 MG: 100 INJECTION, SOLUTION INTRAVENOUS at 20:26

## 2024-01-01 RX ADMIN — Medication 5 ML: at 14:45

## 2024-01-01 RX ADMIN — LORAZEPAM 2 MG: 2 INJECTION INTRAMUSCULAR; INTRAVENOUS at 23:57

## 2024-01-01 RX ADMIN — MORPHINE SULFATE 4 MG: 4 INJECTION, SOLUTION INTRAMUSCULAR; INTRAVENOUS at 17:42

## 2024-01-01 RX ADMIN — MORPHINE SULFATE 5 MG: 4 INJECTION, SOLUTION INTRAMUSCULAR; INTRAVENOUS at 04:16

## 2024-01-01 RX ADMIN — LEVETIRACETAM 750 MG: 100 INJECTION, SOLUTION INTRAVENOUS at 20:45

## 2024-01-01 RX ADMIN — ATROPINE SULFATE 2 DROP: 10 SOLUTION/ DROPS OPHTHALMIC at 19:58

## 2024-01-01 RX ADMIN — LORAZEPAM 1 MG: 2 INJECTION INTRAMUSCULAR; INTRAVENOUS at 01:56

## 2024-01-01 RX ADMIN — GLYCOPYRROLATE 0.4 MG: 0.2 INJECTION, SOLUTION INTRAMUSCULAR; INTRAVENOUS at 08:21

## 2024-01-01 RX ADMIN — Medication 5 ML: at 04:50

## 2024-01-01 RX ADMIN — Medication 5 ML: at 14:05

## 2024-01-01 RX ADMIN — LORAZEPAM 1 MG: 2 INJECTION INTRAMUSCULAR; INTRAVENOUS at 00:27

## 2024-01-01 RX ADMIN — MORPHINE SULFATE 4 MG: 4 INJECTION, SOLUTION INTRAMUSCULAR; INTRAVENOUS at 06:23

## 2024-01-01 RX ADMIN — MORPHINE SULFATE 10 MG: 100 SOLUTION ORAL at 16:21

## 2024-01-01 RX ADMIN — MORPHINE SULFATE 3 MG: 2 INJECTION, SOLUTION INTRAMUSCULAR; INTRAVENOUS at 13:30

## 2024-01-01 RX ADMIN — LORAZEPAM 2 MG: 2 INJECTION INTRAMUSCULAR; INTRAVENOUS at 16:13

## 2024-01-01 RX ADMIN — ATROPINE SULFATE 2 DROP: 10 SOLUTION/ DROPS OPHTHALMIC at 22:49

## 2024-01-01 RX ADMIN — MORPHINE SULFATE 4 MG: 2 INJECTION, SOLUTION INTRAMUSCULAR; INTRAVENOUS at 08:05

## 2024-01-01 RX ADMIN — MORPHINE SULFATE 10 MG: 100 SOLUTION ORAL at 15:14

## 2024-01-01 RX ADMIN — MORPHINE SULFATE 10 MG: 100 SOLUTION ORAL at 12:08

## 2024-01-01 RX ADMIN — LORAZEPAM 2 MG: 2 INJECTION INTRAMUSCULAR; INTRAVENOUS at 20:59

## 2024-01-01 RX ADMIN — NICOTINE 1 PATCH: 14 PATCH, EXTENDED RELEASE TRANSDERMAL at 08:12

## 2024-01-01 RX ADMIN — MORPHINE SULFATE 3 MG: 2 INJECTION, SOLUTION INTRAMUSCULAR; INTRAVENOUS at 20:38

## 2024-01-01 RX ADMIN — MORPHINE SULFATE 10 MG: 100 SOLUTION ORAL at 10:57

## 2024-01-01 RX ADMIN — NICOTINE 1 PATCH: 14 PATCH, EXTENDED RELEASE TRANSDERMAL at 08:38

## 2024-01-01 RX ADMIN — MORPHINE SULFATE 5 MG: 100 SOLUTION ORAL at 16:29

## 2024-01-01 RX ADMIN — OLANZAPINE 10 MG: 5 TABLET, ORALLY DISINTEGRATING ORAL at 22:08

## 2024-01-01 RX ADMIN — GLYCOPYRROLATE 0.4 MG: 0.2 INJECTION, SOLUTION INTRAMUSCULAR; INTRAVENOUS at 21:34

## 2024-01-01 RX ADMIN — LORAZEPAM 1 MG: 1 TABLET ORAL at 22:29

## 2024-01-01 RX ADMIN — MORPHINE SULFATE 3 MG: 2 INJECTION, SOLUTION INTRAMUSCULAR; INTRAVENOUS at 08:26

## 2024-01-01 RX ADMIN — SODIUM CHLORIDE, POTASSIUM CHLORIDE, SODIUM LACTATE AND CALCIUM CHLORIDE: 600; 310; 30; 20 INJECTION, SOLUTION INTRAVENOUS at 11:09

## 2024-01-01 RX ADMIN — LORAZEPAM 1 MG: 2 INJECTION INTRAMUSCULAR; INTRAVENOUS at 23:52

## 2024-01-01 RX ADMIN — NICOTINE 1 PATCH: 14 PATCH, EXTENDED RELEASE TRANSDERMAL at 10:49

## 2024-01-01 RX ADMIN — MORPHINE SULFATE 10 MG: 100 SOLUTION ORAL at 16:15

## 2024-01-01 RX ADMIN — Medication 5 ML: at 02:12

## 2024-01-01 RX ADMIN — GLYCOPYRROLATE 0.4 MG: 0.2 INJECTION, SOLUTION INTRAMUSCULAR; INTRAVENOUS at 02:16

## 2024-01-01 RX ADMIN — LEVETIRACETAM 750 MG: 100 INJECTION, SOLUTION INTRAVENOUS at 10:08

## 2024-01-01 RX ADMIN — NICOTINE 1 PATCH: 14 PATCH, EXTENDED RELEASE TRANSDERMAL at 08:02

## 2024-01-01 RX ADMIN — Medication 5 ML: at 05:43

## 2024-01-01 RX ADMIN — MORPHINE SULFATE 5 MG: 100 SOLUTION ORAL at 00:46

## 2024-01-01 RX ADMIN — OXYCODONE HYDROCHLORIDE 5 MG: 5 TABLET ORAL at 11:54

## 2024-01-01 RX ADMIN — MORPHINE SULFATE 15 MG: 100 SOLUTION ORAL at 20:24

## 2024-01-01 RX ADMIN — MORPHINE SULFATE 4 MG: 2 INJECTION, SOLUTION INTRAMUSCULAR; INTRAVENOUS at 23:01

## 2024-01-01 RX ADMIN — LORAZEPAM 1 MG: 1 TABLET ORAL at 21:12

## 2024-01-01 RX ADMIN — MORPHINE SULFATE 4 MG: 2 INJECTION, SOLUTION INTRAMUSCULAR; INTRAVENOUS at 04:50

## 2024-01-01 RX ADMIN — MORPHINE SULFATE 4 MG: 4 INJECTION, SOLUTION INTRAMUSCULAR; INTRAVENOUS at 00:54

## 2024-01-01 RX ADMIN — Medication 5 ML: at 02:56

## 2024-01-01 RX ADMIN — GLYCOPYRROLATE 0.4 MG: 0.2 INJECTION, SOLUTION INTRAMUSCULAR; INTRAVENOUS at 20:58

## 2024-01-01 RX ADMIN — LEVETIRACETAM 750 MG: 100 INJECTION, SOLUTION INTRAVENOUS at 21:13

## 2024-01-01 RX ADMIN — LEVETIRACETAM 750 MG: 100 INJECTION, SOLUTION INTRAVENOUS at 08:38

## 2024-01-01 RX ADMIN — NICOTINE 1 PATCH: 14 PATCH, EXTENDED RELEASE TRANSDERMAL at 08:52

## 2024-01-01 RX ADMIN — MORPHINE SULFATE 4 MG: 4 INJECTION, SOLUTION INTRAMUSCULAR; INTRAVENOUS at 15:29

## 2024-01-01 RX ADMIN — HEPARIN SODIUM (PORCINE) LOCK FLUSH IV SOLN 100 UNIT/ML 5 ML: 100 SOLUTION at 15:20

## 2024-01-01 RX ADMIN — LORAZEPAM 1 MG: 2 INJECTION INTRAMUSCULAR; INTRAVENOUS at 12:28

## 2024-01-01 RX ADMIN — MORPHINE SULFATE 5 MG: 100 SOLUTION ORAL at 06:22

## 2024-01-01 RX ADMIN — LEVETIRACETAM 750 MG: 100 INJECTION, SOLUTION INTRAVENOUS at 09:01

## 2024-01-01 RX ADMIN — LORAZEPAM 2 MG: 2 INJECTION INTRAMUSCULAR; INTRAVENOUS at 04:58

## 2024-01-01 RX ADMIN — MORPHINE SULFATE 5 MG: 4 INJECTION, SOLUTION INTRAMUSCULAR; INTRAVENOUS at 05:23

## 2024-01-01 RX ADMIN — LORAZEPAM 1 MG: 2 INJECTION INTRAMUSCULAR; INTRAVENOUS at 19:57

## 2024-01-01 RX ADMIN — GLYCOPYRROLATE 0.4 MG: 0.2 INJECTION, SOLUTION INTRAMUSCULAR; INTRAVENOUS at 09:16

## 2024-01-01 RX ADMIN — MORPHINE SULFATE 3 MG: 2 INJECTION, SOLUTION INTRAMUSCULAR; INTRAVENOUS at 03:04

## 2024-01-01 RX ADMIN — LEVETIRACETAM 750 MG: 100 INJECTION, SOLUTION INTRAVENOUS at 21:34

## 2024-01-01 RX ADMIN — SODIUM CHLORIDE 1000 ML: 9 INJECTION, SOLUTION INTRAVENOUS at 10:37

## 2024-01-01 RX ADMIN — ATROPINE SULFATE 2 DROP: 10 SOLUTION/ DROPS OPHTHALMIC at 14:02

## 2024-01-01 RX ADMIN — ATROPINE SULFATE 2 DROP: 10 SOLUTION/ DROPS OPHTHALMIC at 03:48

## 2024-01-01 RX ADMIN — MORPHINE SULFATE 4 MG: 4 INJECTION, SOLUTION INTRAMUSCULAR; INTRAVENOUS at 09:50

## 2024-01-01 RX ADMIN — Medication 5 ML: at 06:32

## 2024-01-01 RX ADMIN — LORAZEPAM 1 MG: 2 INJECTION INTRAMUSCULAR; INTRAVENOUS at 03:10

## 2024-01-01 RX ADMIN — GLYCOPYRROLATE 0.4 MG: 0.2 INJECTION, SOLUTION INTRAMUSCULAR; INTRAVENOUS at 07:55

## 2024-01-01 RX ADMIN — MORPHINE SULFATE 5 MG: 4 INJECTION, SOLUTION INTRAMUSCULAR; INTRAVENOUS at 07:29

## 2024-01-01 RX ADMIN — ALTEPLASE 2 MG: 2.2 INJECTION, POWDER, LYOPHILIZED, FOR SOLUTION INTRAVENOUS at 22:40

## 2024-01-01 RX ADMIN — LORAZEPAM 1 MG: 2 INJECTION INTRAMUSCULAR; INTRAVENOUS at 01:07

## 2024-01-01 RX ADMIN — LORAZEPAM 1 MG: 2 INJECTION INTRAMUSCULAR; INTRAVENOUS at 20:56

## 2024-01-01 RX ADMIN — MORPHINE SULFATE 4 MG: 4 INJECTION, SOLUTION INTRAMUSCULAR; INTRAVENOUS at 07:54

## 2024-01-01 RX ADMIN — LORAZEPAM 1 MG: 2 CONCENTRATE ORAL at 02:08

## 2024-01-01 RX ADMIN — IOPAMIDOL 67 ML: 755 INJECTION, SOLUTION INTRAVENOUS at 18:18

## 2024-01-01 ASSESSMENT — ACTIVITIES OF DAILY LIVING (ADL)
ADLS_ACUITY_SCORE: 56
ADLS_ACUITY_SCORE: 36
ADLS_ACUITY_SCORE: 39
ADLS_ACUITY_SCORE: 61
ADLS_ACUITY_SCORE: 56
ADLS_ACUITY_SCORE: 39
ADLS_ACUITY_SCORE: 54
ADLS_ACUITY_SCORE: 39
ADLS_ACUITY_SCORE: 35
ADLS_ACUITY_SCORE: 55
ADLS_ACUITY_SCORE: 58
ADLS_ACUITY_SCORE: 55
ADLS_ACUITY_SCORE: 61
ADLS_ACUITY_SCORE: 51
ADLS_ACUITY_SCORE: 54
ADLS_ACUITY_SCORE: 56
ADLS_ACUITY_SCORE: 61
ADLS_ACUITY_SCORE: 61
ADLS_ACUITY_SCORE: 54
ADLS_ACUITY_SCORE: 61
ADLS_ACUITY_SCORE: 54
ADLS_ACUITY_SCORE: 61
ADLS_ACUITY_SCORE: 54
ADLS_ACUITY_SCORE: 35
ADLS_ACUITY_SCORE: 54
ADLS_ACUITY_SCORE: 56
ADLS_ACUITY_SCORE: 55
ADLS_ACUITY_SCORE: 57
ADLS_ACUITY_SCORE: 41
ADLS_ACUITY_SCORE: 39
ADLS_ACUITY_SCORE: 61
ADLS_ACUITY_SCORE: 61
ADLS_ACUITY_SCORE: 48
ADLS_ACUITY_SCORE: 48
ADLS_ACUITY_SCORE: 54
ADLS_ACUITY_SCORE: 36
ADLS_ACUITY_SCORE: 61
ADLS_ACUITY_SCORE: 41
ADLS_ACUITY_SCORE: 61
ADLS_ACUITY_SCORE: 61
ADLS_ACUITY_SCORE: 43
ADLS_ACUITY_SCORE: 39
ADLS_ACUITY_SCORE: 39
ADLS_ACUITY_SCORE: 55
ADLS_ACUITY_SCORE: 39
ADLS_ACUITY_SCORE: 35
ADLS_ACUITY_SCORE: 52
ADLS_ACUITY_SCORE: 61
ADLS_ACUITY_SCORE: 55
ADLS_ACUITY_SCORE: 57
ADLS_ACUITY_SCORE: 36
ADLS_ACUITY_SCORE: 61
ADLS_ACUITY_SCORE: 39
ADLS_ACUITY_SCORE: 61
ADLS_ACUITY_SCORE: 61
ADLS_ACUITY_SCORE: 55
ADLS_ACUITY_SCORE: 48
ADLS_ACUITY_SCORE: 48
ADLS_ACUITY_SCORE: 39
ADLS_ACUITY_SCORE: 48
ADLS_ACUITY_SCORE: 54
ADLS_ACUITY_SCORE: 39
ADLS_ACUITY_SCORE: 48
ADLS_ACUITY_SCORE: 39
ADLS_ACUITY_SCORE: 55
ADLS_ACUITY_SCORE: 59
ADLS_ACUITY_SCORE: 54
ADLS_ACUITY_SCORE: 50
ADLS_ACUITY_SCORE: 56
ADLS_ACUITY_SCORE: 55
ADLS_ACUITY_SCORE: 39
ADLS_ACUITY_SCORE: 53
ADLS_ACUITY_SCORE: 56
ADLS_ACUITY_SCORE: 39
ADLS_ACUITY_SCORE: 56
ADLS_ACUITY_SCORE: 39
ADLS_ACUITY_SCORE: 61
ADLS_ACUITY_SCORE: 55
ADLS_ACUITY_SCORE: 55
ADLS_ACUITY_SCORE: 57
ADLS_ACUITY_SCORE: 55
ADLS_ACUITY_SCORE: 61
ADLS_ACUITY_SCORE: 55
ADLS_ACUITY_SCORE: 36
ADLS_ACUITY_SCORE: 61
ADLS_ACUITY_SCORE: 61
ADLS_ACUITY_SCORE: 46
ADLS_ACUITY_SCORE: 61
ADLS_ACUITY_SCORE: 39
ADLS_ACUITY_SCORE: 39
ADLS_ACUITY_SCORE: 61
ADLS_ACUITY_SCORE: 35
ADLS_ACUITY_SCORE: 47
ADLS_ACUITY_SCORE: 61
ADLS_ACUITY_SCORE: 54
ADLS_ACUITY_SCORE: 55
ADLS_ACUITY_SCORE: 39
ADLS_ACUITY_SCORE: 61
ADLS_ACUITY_SCORE: 55
ADLS_ACUITY_SCORE: 56
ADLS_ACUITY_SCORE: 48
ADLS_ACUITY_SCORE: 61
ADLS_ACUITY_SCORE: 36
ADLS_ACUITY_SCORE: 35
ADLS_ACUITY_SCORE: 55
ADLS_ACUITY_SCORE: 56
ADLS_ACUITY_SCORE: 61
ADLS_ACUITY_SCORE: 53
ADLS_ACUITY_SCORE: 50
ADLS_ACUITY_SCORE: 35
ADLS_ACUITY_SCORE: 61
ADLS_ACUITY_SCORE: 51
ADLS_ACUITY_SCORE: 61
ADLS_ACUITY_SCORE: 50
ADLS_ACUITY_SCORE: 56
ADLS_ACUITY_SCORE: 61
ADLS_ACUITY_SCORE: 39
ADLS_ACUITY_SCORE: 48
ADLS_ACUITY_SCORE: 53
ADLS_ACUITY_SCORE: 53
ADLS_ACUITY_SCORE: 61
ADLS_ACUITY_SCORE: 51
ADLS_ACUITY_SCORE: 61
ADLS_ACUITY_SCORE: 54
ADLS_ACUITY_SCORE: 48
ADLS_ACUITY_SCORE: 61
ADLS_ACUITY_SCORE: 40
ADLS_ACUITY_SCORE: 52
ADLS_ACUITY_SCORE: 61
ADLS_ACUITY_SCORE: 54
ADLS_ACUITY_SCORE: 55
ADLS_ACUITY_SCORE: 53
ADLS_ACUITY_SCORE: 51
ADLS_ACUITY_SCORE: 55
ADLS_ACUITY_SCORE: 61
ADLS_ACUITY_SCORE: 57
ADLS_ACUITY_SCORE: 39
ADLS_ACUITY_SCORE: 35
ADLS_ACUITY_SCORE: 51
ADLS_ACUITY_SCORE: 61
ADLS_ACUITY_SCORE: 39
ADLS_ACUITY_SCORE: 45
ADLS_ACUITY_SCORE: 59
ADLS_ACUITY_SCORE: 54
ADLS_ACUITY_SCORE: 53
ADLS_ACUITY_SCORE: 39
ADLS_ACUITY_SCORE: 51
ADLS_ACUITY_SCORE: 39
ADLS_ACUITY_SCORE: 35
ADLS_ACUITY_SCORE: 54
ADLS_ACUITY_SCORE: 48
ADLS_ACUITY_SCORE: 43
ADLS_ACUITY_SCORE: 61
ADLS_ACUITY_SCORE: 61
ADLS_ACUITY_SCORE: 48
ADLS_ACUITY_SCORE: 55
ADLS_ACUITY_SCORE: 53
ADLS_ACUITY_SCORE: 61
ADLS_ACUITY_SCORE: 47
ADLS_ACUITY_SCORE: 61
ADLS_ACUITY_SCORE: 61
ADLS_ACUITY_SCORE: 47
ADLS_ACUITY_SCORE: 55
ADLS_ACUITY_SCORE: 43
ADLS_ACUITY_SCORE: 61
ADLS_ACUITY_SCORE: 35
ADLS_ACUITY_SCORE: 57
ADLS_ACUITY_SCORE: 56
ADLS_ACUITY_SCORE: 54
ADLS_ACUITY_SCORE: 55
ADLS_ACUITY_SCORE: 57
ADLS_ACUITY_SCORE: 61
ADLS_ACUITY_SCORE: 61
ADLS_ACUITY_SCORE: 54
ADLS_ACUITY_SCORE: 61
ADLS_ACUITY_SCORE: 54
ADLS_ACUITY_SCORE: 61
ADLS_ACUITY_SCORE: 55
ADLS_ACUITY_SCORE: 54
ADLS_ACUITY_SCORE: 61
ADLS_ACUITY_SCORE: 61
ADLS_ACUITY_SCORE: 51
ADLS_ACUITY_SCORE: 52
ADLS_ACUITY_SCORE: 41
ADLS_ACUITY_SCORE: 48
ADLS_ACUITY_SCORE: 57
ADLS_ACUITY_SCORE: 61
ADLS_ACUITY_SCORE: 53
ADLS_ACUITY_SCORE: 55
ADLS_ACUITY_SCORE: 42
ADLS_ACUITY_SCORE: 61
ADLS_ACUITY_SCORE: 53
ADLS_ACUITY_SCORE: 48
ADLS_ACUITY_SCORE: 61
ADLS_ACUITY_SCORE: 55
ADLS_ACUITY_SCORE: 56
ADLS_ACUITY_SCORE: 61
ADLS_ACUITY_SCORE: 47
ADLS_ACUITY_SCORE: 55
ADLS_ACUITY_SCORE: 39
ADLS_ACUITY_SCORE: 48
ADLS_ACUITY_SCORE: 58
ADLS_ACUITY_SCORE: 54
ADLS_ACUITY_SCORE: 36
ADLS_ACUITY_SCORE: 39
ADLS_ACUITY_SCORE: 57
ADLS_ACUITY_SCORE: 51
ADLS_ACUITY_SCORE: 39
ADLS_ACUITY_SCORE: 61
ADLS_ACUITY_SCORE: 55
ADLS_ACUITY_SCORE: 54
ADLS_ACUITY_SCORE: 55
ADLS_ACUITY_SCORE: 47
ADLS_ACUITY_SCORE: 50
ADLS_ACUITY_SCORE: 61
ADLS_ACUITY_SCORE: 54
ADLS_ACUITY_SCORE: 50
ADLS_ACUITY_SCORE: 61
ADLS_ACUITY_SCORE: 61
ADLS_ACUITY_SCORE: 53
ADLS_ACUITY_SCORE: 56
ADLS_ACUITY_SCORE: 43
ADLS_ACUITY_SCORE: 36
ADLS_ACUITY_SCORE: 54
ADLS_ACUITY_SCORE: 61
ADLS_ACUITY_SCORE: 56
ADLS_ACUITY_SCORE: 57
ADLS_ACUITY_SCORE: 54
ADLS_ACUITY_SCORE: 47
ADLS_ACUITY_SCORE: 48
ADLS_ACUITY_SCORE: 61
ADLS_ACUITY_SCORE: 51
ADLS_ACUITY_SCORE: 43
ADLS_ACUITY_SCORE: 61
ADLS_ACUITY_SCORE: 35
ADLS_ACUITY_SCORE: 57
ADLS_ACUITY_SCORE: 61
ADLS_ACUITY_SCORE: 48
ADLS_ACUITY_SCORE: 61
ADLS_ACUITY_SCORE: 55
ADLS_ACUITY_SCORE: 43
ADLS_ACUITY_SCORE: 61
ADLS_ACUITY_SCORE: 39
ADLS_ACUITY_SCORE: 53
ADLS_ACUITY_SCORE: 50
ADLS_ACUITY_SCORE: 48
ADLS_ACUITY_SCORE: 48
ADLS_ACUITY_SCORE: 61
ADLS_ACUITY_SCORE: 54
ADLS_ACUITY_SCORE: 51
ADLS_ACUITY_SCORE: 39
ADLS_ACUITY_SCORE: 48
ADLS_ACUITY_SCORE: 55
ADLS_ACUITY_SCORE: 61
ADLS_ACUITY_SCORE: 53
ADLS_ACUITY_SCORE: 51
ADLS_ACUITY_SCORE: 61
ADLS_ACUITY_SCORE: 52
ADLS_ACUITY_SCORE: 61
ADLS_ACUITY_SCORE: 50
ADLS_ACUITY_SCORE: 52
ADLS_ACUITY_SCORE: 61
ADLS_ACUITY_SCORE: 36
ADLS_ACUITY_SCORE: 48
ADLS_ACUITY_SCORE: 35
ADLS_ACUITY_SCORE: 51
ADLS_ACUITY_SCORE: 42
ADLS_ACUITY_SCORE: 56
ADLS_ACUITY_SCORE: 56
ADLS_ACUITY_SCORE: 61
ADLS_ACUITY_SCORE: 55
ADLS_ACUITY_SCORE: 52
ADLS_ACUITY_SCORE: 61
ADLS_ACUITY_SCORE: 56
ADLS_ACUITY_SCORE: 35
ADLS_ACUITY_SCORE: 61
ADLS_ACUITY_SCORE: 55
ADLS_ACUITY_SCORE: 61
ADLS_ACUITY_SCORE: 54
ADLS_ACUITY_SCORE: 54
ADLS_ACUITY_SCORE: 48
ADLS_ACUITY_SCORE: 54
ADLS_ACUITY_SCORE: 61
ADLS_ACUITY_SCORE: 39
ADLS_ACUITY_SCORE: 54
ADLS_ACUITY_SCORE: 52
ADLS_ACUITY_SCORE: 53
ADLS_ACUITY_SCORE: 43
ADLS_ACUITY_SCORE: 61
ADLS_ACUITY_SCORE: 61
ADLS_ACUITY_SCORE: 50
ADLS_ACUITY_SCORE: 36
ADLS_ACUITY_SCORE: 48
ADLS_ACUITY_SCORE: 55
ADLS_ACUITY_SCORE: 48
ADLS_ACUITY_SCORE: 61
ADLS_ACUITY_SCORE: 61
ADLS_ACUITY_SCORE: 57
ADLS_ACUITY_SCORE: 61
ADLS_ACUITY_SCORE: 35
ADLS_ACUITY_SCORE: 39
ADLS_ACUITY_SCORE: 61
ADLS_ACUITY_SCORE: 45
ADLS_ACUITY_SCORE: 58
ADLS_ACUITY_SCORE: 35
ADLS_ACUITY_SCORE: 61
ADLS_ACUITY_SCORE: 55
ADLS_ACUITY_SCORE: 39
ADLS_ACUITY_SCORE: 47
ADLS_ACUITY_SCORE: 36
ADLS_ACUITY_SCORE: 35
ADLS_ACUITY_SCORE: 54
ADLS_ACUITY_SCORE: 53
ADLS_ACUITY_SCORE: 55
ADLS_ACUITY_SCORE: 36
ADLS_ACUITY_SCORE: 58
ADLS_ACUITY_SCORE: 59
ADLS_ACUITY_SCORE: 57
ADLS_ACUITY_SCORE: 55
ADLS_ACUITY_SCORE: 43
ADLS_ACUITY_SCORE: 61
ADLS_ACUITY_SCORE: 61
ADLS_ACUITY_SCORE: 36
ADLS_ACUITY_SCORE: 61
ADLS_ACUITY_SCORE: 61
ADLS_ACUITY_SCORE: 56
ADLS_ACUITY_SCORE: 55
ADLS_ACUITY_SCORE: 53
ADLS_ACUITY_SCORE: 61
ADLS_ACUITY_SCORE: 51
ADLS_ACUITY_SCORE: 61
ADLS_ACUITY_SCORE: 50
ADLS_ACUITY_SCORE: 61
ADLS_ACUITY_SCORE: 53
ADLS_ACUITY_SCORE: 48
ADLS_ACUITY_SCORE: 57
ADLS_ACUITY_SCORE: 57
ADLS_ACUITY_SCORE: 55
ADLS_ACUITY_SCORE: 56
ADLS_ACUITY_SCORE: 61
ADLS_ACUITY_SCORE: 35
ADLS_ACUITY_SCORE: 39
ADLS_ACUITY_SCORE: 48
ADLS_ACUITY_SCORE: 54
ADLS_ACUITY_SCORE: 39
ADLS_ACUITY_SCORE: 61
ADLS_ACUITY_SCORE: 35
ADLS_ACUITY_SCORE: 61
ADLS_ACUITY_SCORE: 61
ADLS_ACUITY_SCORE: 59
ADLS_ACUITY_SCORE: 61
ADLS_ACUITY_SCORE: 39
ADLS_ACUITY_SCORE: 39
ADLS_ACUITY_SCORE: 61
ADLS_ACUITY_SCORE: 36
ADLS_ACUITY_SCORE: 61
ADLS_ACUITY_SCORE: 53
ADLS_ACUITY_SCORE: 61
ADLS_ACUITY_SCORE: 51
ADLS_ACUITY_SCORE: 61
ADLS_ACUITY_SCORE: 39
ADLS_ACUITY_SCORE: 48
ADLS_ACUITY_SCORE: 61
ADLS_ACUITY_SCORE: 35
ADLS_ACUITY_SCORE: 56
ADLS_ACUITY_SCORE: 61
ADLS_ACUITY_SCORE: 51
ADLS_ACUITY_SCORE: 35
ADLS_ACUITY_SCORE: 51
ADLS_ACUITY_SCORE: 61
ADLS_ACUITY_SCORE: 43
ADLS_ACUITY_SCORE: 61
ADLS_ACUITY_SCORE: 48
ADLS_ACUITY_SCORE: 51
ADLS_ACUITY_SCORE: 48
ADLS_ACUITY_SCORE: 39
ADLS_ACUITY_SCORE: 39
ADLS_ACUITY_SCORE: 61
ADLS_ACUITY_SCORE: 57

## 2024-01-01 ASSESSMENT — COLUMBIA-SUICIDE SEVERITY RATING SCALE - C-SSRS
1. IN THE PAST MONTH, HAVE YOU WISHED YOU WERE DEAD OR WISHED YOU COULD GO TO SLEEP AND NOT WAKE UP?: NO
2. HAVE YOU ACTUALLY HAD ANY THOUGHTS OF KILLING YOURSELF IN THE PAST MONTH?: NO
6. HAVE YOU EVER DONE ANYTHING, STARTED TO DO ANYTHING, OR PREPARED TO DO ANYTHING TO END YOUR LIFE?: NO

## 2024-02-08 NOTE — ED TRIAGE NOTES
Pt seen by PCP on Wednesday for constipation and possibly hemorroids. Last night had a BM with millicent red blood. Is not bleeding in between bowel movements. Currently being treated for colorectal cancer.

## 2024-02-08 NOTE — ED TRIAGE NOTES
Spotting rectally very lightly for a month. Yesterday patient said that he started bleeding heavily rectally. Patient has a history of colon cancer.     Triage Assessment (Adult)       Row Name 02/08/24 140          Triage Assessment    Airway WDL WDL        Respiratory WDL    Respiratory WDL WDL        Skin Circulation/Temperature WDL    Skin Circulation/Temperature WDL WDL        Cardiac WDL    Cardiac WDL WDL        Peripheral/Neurovascular WDL    Peripheral Neurovascular WDL WDL        Cognitive/Neuro/Behavioral WDL    Cognitive/Neuro/Behavioral WDL WDL        [place] Coma Scale    Best Eye Response 4-->(E4) spontaneous     Best Motor Response 6-->(M6) obeys commands     Best Verbal Response 5-->(V5) oriented     [place] Coma Scale Score 15                      55.3

## 2024-02-08 NOTE — ED PROVIDER NOTES
"  History     Chief Complaint:  Rectal Bleeding       HPI   Ronnie Herrera Jr. is a 64 year old male who presents with rectal bleeding.  The patient states that he has had spotting off and on with bowel movements for about a month.  However, for the last day or 2 the patient has had more copious blood with bowel movement.  He states that his stools been harder lately and he is straining more to have a bowel movement.  Denies abdominal pain.  No blood thinners.      Independent Historian:   None - Patient Only    Review of External Notes:   Vascular surgery note from 11/30/2023 reviewed.  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  Also noted to have rectal cancer that is metastatic to lung.  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      Medications:    capecitabine (XELODA) 500 MG tablet  LORazepam (ATIVAN) 1 MG tablet        Past Medical History:    Past Medical History:   Diagnosis Date    Aortic aneurysm (H24)     Colon cancer (H)        Past Surgical History:    Past Surgical History:   Procedure Laterality Date    APPENDECTOMY      HERNIA REPAIR      IR LUNG BIOPSY MEDIASTINUM RIGHT  4/18/2022    VASCULAR SURGERY      aortic aneurysm        Physical Exam   Patient Vitals for the past 24 hrs:   BP Temp Temp src Pulse Resp SpO2 Height Weight   02/08/24 1646 (!) 150/99 -- -- 63 15 99 % -- --   02/08/24 1053 (!) 166/87 98.9  F (37.2  C) Temporal 73 16 98 % 1.803 m (5' 11\") 63.5 kg (140 lb)        Physical Exam  General: Laying on the ED bed  HEENT: Normocephalic, atraumatic  Cardiac: Warm and well perfused, regular rate and rhythm  Pulm: Breathing comfortably, no accessory muscle usage, no conversational dyspnea, and lungs clear bilaterally  GI: Abdomen soft, nontender, no rigidity or guarding, few soft hemorrhoids at the anal verge without active bleeding  MSK: No bony deformities  Skin: Warm and dry  Neuro: " Moves all extremities  Psych: Pleasant mood and affect      Emergency Department Course       Laboratory:  Labs Ordered and Resulted from Time of ED Arrival to Time of ED Departure   BASIC METABOLIC PANEL - Abnormal       Result Value    Sodium 139      Potassium 4.1      Chloride 103      Carbon Dioxide (CO2) 27      Anion Gap 9      Urea Nitrogen 10.0      Creatinine 0.98      GFR Estimate 86      Calcium 8.9      Glucose 103 (*)    CBC WITH PLATELETS AND DIFFERENTIAL - Abnormal    WBC Count 5.1      RBC Count 4.58      Hemoglobin 15.5      Hematocrit 44.4      MCV 97      MCH 33.8 (*)     MCHC 34.9      RDW 13.1      Platelet Count 115 (*)     % Neutrophils 77      % Lymphocytes 15      % Monocytes 5      % Eosinophils 3      % Basophils 0      % Immature Granulocytes 0      NRBCs per 100 WBC 0      Absolute Neutrophils 3.9      Absolute Lymphocytes 0.8      Absolute Monocytes 0.3      Absolute Eosinophils 0.1      Absolute Basophils 0.0      Absolute Immature Granulocytes 0.0      Absolute NRBCs 0.0     INR - Normal    INR 0.95     PARTIAL THROMBOPLASTIN TIME - Normal    aPTT 30     MAGNESIUM - Normal    Magnesium 2.3     TYPE AND SCREEN, ADULT    ABO/RH(D) O POS      Antibody Screen Negative      SPECIMEN EXPIRATION DATE [id number]     ABO/RH TYPE AND SCREEN        Procedures   None    Emergency Department Course & Assessments:             Interventions:  Medications - No data to display     Assessments:  1420 initial evaluation  1615 reevaluation    Independent Interpretation (X-rays, CTs, rhythm strip):  None    Consultations/Discussion of Management or Tests:  None        Social Determinants of Health affecting care:   None    Disposition:  The patient was discharged.     Impression & Plan    CMS Diagnoses: None    Medical Decision Makin-year-old male presents with rectal bleeding as above.  Vital signs are thankfully stable.  There are external hemorrhoids on examination, none that are actively  bleeding.  He did show me a picture of the toilet bowl from his largest episode of blood in the stool.  The toilet bowl water appears pink/blood-tinged.  He is not anticoagulated.  He is thrombocytopenic on recent lab work from the oncology clinic.  CBC today shows stable hemoglobin and thrombocytopenia.  CT of the chest/abdomen/pelvis on 1/29/2024 was largely unremarkable.  In the absence of abdominal pain and with the patient's stable presentation here today, I do not think that further imaging is warranted at this time.  It seems the most likely source of bleeding is his external hemorrhoids.  With stable vital signs and lab work as above, I believe these can be safely evaluated and treated from the outpatient setting.  I discussed this with the patient and he is agreeable to that as well.  Plan is discharge home with PCP follow-up for further care.  I advised the patient to RTED for any worsening bleeding, lightheadedness, tachycardia, shortness of breath, or other emergent concerns.  He expressed understanding and agreement.      Diagnosis:    ICD-10-CM    1. Rectal bleeding  K62.5       2. External hemorrhoids  K64.4          2/8/2024   Nilo Paulino MD King, Colin, MD  02/08/24 1708

## 2024-04-17 PROBLEM — I77.6 AORTITIS (H): Status: ACTIVE | Noted: 2018-03-13

## 2024-04-17 PROBLEM — N52.9 ERECTILE DYSFUNCTION: Status: ACTIVE | Noted: 2019-12-30

## 2024-04-17 PROBLEM — R62.7 FAILURE TO THRIVE IN ADULT: Status: ACTIVE | Noted: 2024-01-01

## 2024-04-17 PROBLEM — K43.9 VENTRAL HERNIA: Status: ACTIVE | Noted: 2017-10-25

## 2024-04-17 PROBLEM — F41.9 ANXIETY: Status: ACTIVE | Noted: 2019-12-30

## 2024-04-17 PROBLEM — K56.609 SBO (SMALL BOWEL OBSTRUCTION) (H): Status: ACTIVE | Noted: 2017-10-25

## 2024-04-17 PROBLEM — C18.9 MALIGNANT NEOPLASM OF COLON (H): Status: ACTIVE | Noted: 2024-01-01

## 2024-04-17 PROBLEM — K02.9 DENTAL CARIES: Status: ACTIVE | Noted: 2018-03-13

## 2024-04-17 PROBLEM — Z87.19 HISTORY OF SMALL BOWEL OBSTRUCTION: Status: ACTIVE | Noted: 2018-03-13

## 2024-04-17 NOTE — PROGRESS NOTES
RECEIVING UNIT ED HANDOFF REVIEW    ED Nurse Handoff Report was reviewed by: Luis A Corona RN on April 17, 2024 at 5:28 PM

## 2024-04-17 NOTE — ED TRIAGE NOTES
Pt presents with complaints of generalized weakness after several weeks of radiation, finishing yesterday. Pt has not been eat or drinking well. Stage 4 rectal Ca. Pt sent by Onc for admission.      Triage Assessment (Adult)       Row Name 04/17/24 1005          Triage Assessment    Airway WDL WDL        Respiratory WDL    Respiratory WDL WDL        Cognitive/Neuro/Behavioral WDL    Cognitive/Neuro/Behavioral WDL WDL

## 2024-04-17 NOTE — H&P
[facility]    History and Physical - Hospitalist Service       Date of Admission:  4/17/2024    Assessment & Plan      Ronnie Herrera Jr. is a 64 year old male admitted on 4/17/2024. He has a past medical history significant for stage 4 rectal cancer, small bowel obstruction, ventral hernia, abdominal aneurysm s/p aorto ileo-femoral bypass graft (2016), tobacco abuse and chronic back pain. He was seen by his oncologist earlier today who advised him to come to the hospital for admission.     Failure to thrive  Generalized weakness   Stage 4 rectal cancer with metastatic involvement to the lungs, diagnosed 2022  *Follows with Dr. Patrick Dreyer at [facility], last seen 4/17/2024  *Reports decreased appetite with unintentional 21lb weight loss and increased bleeding with bowel movements since March. Has been previously evaluated in the ED for rectal bleeding and noted to have prominent hemorrhoids.   - Admit to inpatient   - [facility] consulted   - Nutrition consult placed     H/o SBO  H/o Ventral hernia, repair 2017  *Reports decreased frequency of bowel movements recently.   *No active signs or symptoms of SBO.   - Continue to monitor     Mycotic aneurysm, repair 12/2016  - Continue to monitor     Tobacco abuse  *Reports intermittent smoking cessation. Currently smoking 1 pack per day. Uses nicotine patches whether he has been actively smoking or not.   - Continue nicotine replacement           Diet:  Regular  DVT Prophylaxis: Pneumatic Compression Devices and Ambulate every shift  Jo Catheter: Not present  Lines: None     Cardiac Monitoring: None  Code Status:  FULL CODE    Clinically Significant Risk Factors Present on Admission                                  Disposition Plan     Medically Ready for Discharge: Anticipated Tomorrow       The patient's care was discussed with the Attending Physician, Dr. Bowser .    [facility]  Hospitalist Service    [facility]  Securely message with [email or web address] (more info)  Text page via [facility] Paging/Directory     ______________________________________________________________________    Chief Complaint   Generalized weakness    History is obtained from the patient, electronic health record, and emergency department physician    History of Present Illness   Ronnie Herrera Jr. is a 64 year old male admitted on 4/17/2024. He has a past medical history significant for stage 4 rectal cancer, small bowel obstruction, ventral hernia, abdominal aneurysm s/p aorto ileo-femoral bypass graft (2016), tobacco abuse and chronic back pain. He was seen by his oncologist earlier today who advised him to come to the hospital for admission.     Patient seen by Dr. Robles in the emergency department. He received a 1L fluid bolus.     Patient reports unintentional weight loss of 21 pounds over the last several months. He completed radiation therapy yesterday, 4/16/2024. He denies nausea or vomiting, states that he can eat but simply does not have an appetite. Denies new or worsening pain. Denies recent illness, fevers, chest pain, shortness of breath or urinary symptoms. He endorses some intermittent dizziness with position changes. There was concern for progression of primary cancer at recent oncology appointment.     Patient's code status was reviewed with patient and his wife, he has elected to be a FULL CODE.        Past Medical History    Past Medical History:   Diagnosis Date    Aortic aneurysm (H24)     Colon cancer (H)        Past Surgical History   Past Surgical History:   Procedure Laterality Date    APPENDECTOMY      HERNIA REPAIR      IR LUNG BIOPSY MEDIASTINUM RIGHT  4/18/2022    VASCULAR SURGERY      aortic aneurysm       Prior to Admission Medications   Prior to Admission Medications   Prescriptions Last Dose Informant Patient Reported? Taking?   LORazepam (ATIVAN) 1 MG tablet   Yes No   Sig: Take 1 mg by  mouth every 6 hours as needed for anxiety   capecitabine (XELODA) 500 MG tablet   Yes No   Sig: Take 1,500 mg/m2 by mouth 2 times daily      Facility-Administered Medications: None        Review of Systems    The 10 point Review of Systems is negative other than noted in the HPI or here.     Social History   I have reviewed this patient's social history and updated it with pertinent information if needed.  Social History     Tobacco Use    Smoking status: Every Day    Smokeless tobacco: Never   Substance Use Topics    Drug use: Never       Allergies   Allergies   Allergen Reactions    Escitalopram Dizziness        Physical Exam   Vital Signs: Temp: 97.6  F (36.4  C) Temp src: Oral BP: 131/82 Pulse: 77   Resp: 20 SpO2: 98 %      Weight: 0 lbs 0 oz    Physical Exam  Vitals and nursing note reviewed.   Constitutional:       Appearance: He is cachectic.   HENT:      Head: Normocephalic.      Mouth/Throat:      Mouth: Mucous membranes are moist.   Eyes:      Pupils: Pupils are equal, round, and reactive to light.   Cardiovascular:      Rate and Rhythm: Normal rate and regular rhythm.      Pulses: Normal pulses.      Heart sounds: Normal heart sounds.   Pulmonary:      Effort: Pulmonary effort is normal.      Breath sounds: Normal breath sounds.   Abdominal:      General: Bowel sounds are decreased. There is no distension.      Palpations: Abdomen is soft.   Musculoskeletal:         General: Normal range of motion.      Cervical back: Normal range of motion.   Skin:     General: Skin is warm and dry.      Capillary Refill: Capillary refill takes less than 2 seconds.   Neurological:      General: No focal deficit present.      Mental Status: He is alert and oriented to person, place, and time. Mental status is at baseline.         Medical Decision Making       75 MINUTES SPENT BY ME on the date of service doing chart review, history, exam, documentation & further activities per the note.      Data     I have personally  reviewed the following data over the past 24 hrs:    N/A  \   N/A   / N/A     140 102 21.5 /  114 (H)   3.9 24 0.93 \     ALT: 9 AST: 15 AP: 87 TBILI: 0.8   ALB: 3.9 TOT PROTEIN: 6.8 LIPASE: N/A       Imaging results reviewed over the past 24 hrs:   No results found for this or any previous visit (from the past 24 hour(s)).

## 2024-04-17 NOTE — PROGRESS NOTES
[id number]  Orientation: AOX4  Activity: SBA per report, not oob yet  Diet/BS Checks: regular  Tele:  NA  IV Access/Drains: PIV SL  Pain Management: denies  Abnormal VS/Results: Hgb 11.7, , wbc 3.6  Bowel/Bladder: cont, no bm  Consults: HemOnc, Nutrition, PT  D/C Disposition: pending  Other Info: reports nausea, PRN Zofran given. States it was effective. Didn't do skin check, will pass onto oncoming RN. Has baseline numbness and tingling in all ext. Not passing flatus. K/Mag/Phosph protocol

## 2024-04-17 NOTE — ED NOTES
[facility]  ED Nurse Handoff Report    ED Chief complaint: Generalized Weakness      ED Diagnosis:   Final diagnoses:   Failure to thrive in adult       Code Status: Unknown    Allergies:   Allergies   Allergen Reactions    Escitalopram Dizziness       Patient Story: Pt presents with complaints of generalized weakness after several weeks of radiation, finishing yesterday. Pt has not been eat or drinking well. Stage 4 rectal Ca. Pt sent by Onc for admission.     Focused Assessment:  Pleasant 64 year old male with a history of Stage 4 rectal cancer presenting with generalized weakness. He recently finished another round of radiation, but has not had much to eat or drinking lately and has lost approximately 21 lbs during that time frame. He was eventually seen by his Oncologist earlier today, and was advised to come to the hospital to be admitted. He denies fever, chills, cough, shortness of breath, nausea, vomiting, diarrhea, or urinary symptoms.     Treatments and/or interventions provided:   No orders to display      Labs Ordered and Resulted from Time of ED Arrival to Time of ED Departure   COMPREHENSIVE METABOLIC PANEL - Abnormal       Result Value    Sodium 140      Potassium 3.9      Carbon Dioxide (CO2) 24      Anion Gap 14      Urea Nitrogen 21.5      Creatinine 0.93      GFR Estimate >90      Calcium 9.4      Chloride 102      Glucose 114 (*)     Alkaline Phosphatase 87      AST 15      ALT 9      Protein Total 6.8      Albumin 3.9      Bilirubin Total 0.8     PHOSPHORUS - Normal    Phosphorus 3.0     MAGNESIUM - Normal    Magnesium 2.2     CBC WITH PLATELETS AND DIFFERENTIAL      Patient's response to treatments and/or interventions: Oncology follow up     Does this patient have any cognitive concerns?:  None    Activity level - Baseline/Home:  Independent  Activity Level - Current:   Unknown    Patient's Preferred language: English   Needed?: No    Isolation: None  Infection: Not  Applicable  Patient tested for COVID 19 prior to admission: No  Bariatric?: No    Vital Signs:   Vitals:    04/17/24 1000 04/17/24 1005 04/17/24 1009   BP:  131/82    Pulse: 77     Resp:   20   Temp:   97.6  F (36.4  C)   TempSrc:   Oral   SpO2:  98%        Was the PSS-3 completed:   Yes  Family Comments: Family at the bedside   OBS brochure/video discussed/provided to patient/family: No    For the majority of the shift this patient's behavior was Green.   Behavioral interventions performed were Care explained.    ED NURSE PHONE NUMBER: [phone]

## 2024-04-17 NOTE — ED PROVIDER NOTES
History   Chief Complaint:  Generalized Weakness       HPI:  Ronnie Herrera Jr. is a very pleasant 64 year old male with a history of Stage 4 rectal cancer with metastatic involvement of the lungs presenting with generalized weakness. He recently finished another round of radiation therapy, but has not had much to eat or drinking lately and has lost approximately 21 lbs during that timeframe. He was eventually seen by his Oncologist earlier today, and was advised to come to the hospital to be admitted.  He has no current symptoms other than generalized weakness.  He denies fever, chills, cough, shortness of breath, nausea, vomiting, diarrhea, or urinary symptoms.    Independent Historian:  Independent history was obtained from the patient's spouse. They provided information detailed above in HPI.     Review of External Notes:  I personally reviewed notes from the patient's  consult note  dated  3/4/2024 . This provided me with information regarding patient's baseline medical problems.     I personally reviewed the patient's chart, including available medication list and available past medical history, past surgical history, family history, and social history.    Physical Exam   Patient Vitals for the past 24 hrs:   BP Temp Temp src Pulse Resp SpO2   04/17/24 1430 131/71 -- -- -- -- 100 %   04/17/24 1009 -- 97.6  F (36.4  C) Oral -- 20 --   04/17/24 1005 131/82 -- -- -- -- 98 %   04/17/24 1000 -- -- -- 77 -- --      Physical Exam  Vitals and nursing note reviewed.   Constitutional:       Appearance: He is underweight. He is not ill-appearing or toxic-appearing.   Cardiovascular:      Rate and Rhythm: Normal rate.   Pulmonary:      Effort: Pulmonary effort is normal.   Abdominal:      Palpations: Abdomen is soft.      Tenderness: There is no abdominal tenderness.   Skin:     General: Skin is warm and dry.      Findings: No rash.   Neurological:      General: No focal deficit present.      Mental Status: He is alert  and oriented to person, place, and time.   Psychiatric:         Behavior: Behavior is cooperative.          Emergency Department Course     Laboratory:   Labs Ordered and Resulted from Time of ED Arrival to Time of ED Departure   COMPREHENSIVE METABOLIC PANEL - Abnormal       Result Value    Sodium 140      Potassium 3.9      Carbon Dioxide (CO2) 24      Anion Gap 14      Urea Nitrogen 21.5      Creatinine 0.93      GFR Estimate >90      Calcium 9.4      Chloride 102      Glucose 114 (*)     Alkaline Phosphatase 87      AST 15      ALT 9      Protein Total 6.8      Albumin 3.9      Bilirubin Total 0.8     PHOSPHORUS - Normal    Phosphorus 3.0     MAGNESIUM - Normal    Magnesium 2.2     CBC WITH PLATELETS AND DIFFERENTIAL        Interventions & Assessments:  Interventions:  Medications   lidocaine 1 % 0.1-1 mL (has no administration in time range)   lidocaine (LMX4) cream (has no administration in time range)   sodium chloride (PF) 0.9% PF flush 3 mL (has no administration in time range)   sodium chloride (PF) 0.9% PF flush 3 mL (has no administration in time range)   senna-docusate (SENOKOT-S/PERICOLACE) 8.6-50 MG per tablet 1 tablet (has no administration in time range)     Or   senna-docusate (SENOKOT-S/PERICOLACE) 8.6-50 MG per tablet 2 tablet (has no administration in time range)   calcium carbonate (TUMS) chewable tablet 1,000 mg (has no administration in time range)   hydrALAZINE (APRESOLINE) tablet 10 mg (has no administration in time range)     Or   hydrALAZINE (APRESOLINE) injection 10 mg (has no administration in time range)   acetaminophen (TYLENOL) tablet 650 mg (has no administration in time range)     Or   acetaminophen (TYLENOL) Suppository 650 mg (has no administration in time range)   oxyCODONE IR (ROXICODONE) half-tab 2.5 mg (has no administration in time range)   oxyCODONE (ROXICODONE) tablet 5 mg (has no administration in time range)   HYDROmorphone (DILAUDID) injection 0.2 mg (has no  administration in time range)   HYDROmorphone (DILAUDID) injection 0.4 mg (has no administration in time range)   melatonin tablet 1 mg (has no administration in time range)   polyethylene glycol (MIRALAX) Packet 17 g (has no administration in time range)   bisacodyl (DULCOLAX) suppository 10 mg (has no administration in time range)   ondansetron (ZOFRAN ODT) ODT tab 4 mg (has no administration in time range)     Or   ondansetron (ZOFRAN) injection 4 mg (has no administration in time range)   prochlorperazine (COMPAZINE) injection 5 mg (has no administration in time range)     Or   prochlorperazine (COMPAZINE) tablet 5 mg (has no administration in time range)     Or   prochlorperazine (COMPAZINE) suppository 12.5 mg (has no administration in time range)   benzocaine-menthol (CHLORASEPTIC) 6-10 MG lozenge 1 lozenge (has no administration in time range)   sodium chloride 0.9% BOLUS 1,000 mL (0 mLs Intravenous Stopped 4/17/24 1130)      Assessments:  Consultations/Discussion of Management or Tests:  Independent Interpretation (X-rays, CT Head, rhythm strip):  ED Course as of 04/17/24 1507   Wed Apr 17, 2024   1037 I obtained history and examined the patient as noted above.   1206 I spoke with Jackeline Philippe for Dr. Bowser from [facility], who accepts the patient for admission.     Social Determinants of Health affecting care:   None.     Disposition:  The patient was admitted to the hospital under the care of Dr. Bowser.     Impression & Plan   CMS Diagnoses: None     MIPS (If applicable):  N/A    Medical Decision Making:  Patient presenting with generalized weakness.  Vital signs reassuring.  Patient has no focal symptoms and no focal physical exam findings.  Suspect symptoms are secondary to his disease process.  Patient's oncology team recommend admission for nutrition evaluation and therapies.  Patient was signed out to hospitalist.         Diagnosis:    ICD-10-CM    1. Failure to thrive in adult  R62.7        2. Rectal cancer (H)  C20       3. Generalized weakness  R53.1            Scribe Disclosure:  I, Heron Delroy, am serving as a scribe at 10:54 AM on 4/17/2024 to document services personally performed by Roverto Robles MD based on my observations and the provider's statements to me.  4/17/2024     Roverto Robles MD  04/17/24 1505

## 2024-04-18 PROBLEM — C20 RECTAL CANCER (H): Status: ACTIVE | Noted: 2024-01-01

## 2024-04-18 PROBLEM — R53.1 GENERALIZED WEAKNESS: Status: ACTIVE | Noted: 2024-01-01

## 2024-04-18 NOTE — PLAN OF CARE
04/18/-1930    Summary: Admitted on 04/17 w/ failure to thrive and generalized weakness    Primary Diagnosis: Stage 4 rectal cancer with metastatic involvement to the lungs,    Orientation: AOX4    Activity: A-1 GB/W    Diet/BS Checks: regular    Tele:  NA    IV Access/Drains: R PIV infusing LR@100 ml/hr    Pain Management: denies    Abnormal VS/Results: VSS on RA    Bowel/Bladder: Continent, using urinal at bedside. Incontinent of bowel at times     Skin/Wounds: Redness at bottom with small peeling    Consults: hem/onc, nutrition, PT    D/C Disposition: TBD    Other Info:  K/Mag/Phosph protocol  -Recheck AM. No BM in this shift. Denies pain, nausea, vomiting. Nicotine patch on R shoulder. Pt is very weak, offered to go for a walk, pt refused. Pt refused to get out of bed today.

## 2024-04-18 NOTE — UTILIZATION REVIEW
"  Admission Status; Secondary Review Determination         Under the authority of the Utilization Management Committee, the utilization review process indicated a secondary review on the above patient.  The review outcome is based on review of the medical records, discussions with staff, and applying clinical experience noted on the date of the review.          (x) Observation Status Appropriate - This patient does not meet hospital inpatient criteria and is placed in observation status. If this patient's primary payer is Medicare and was admitted as an inpatient, Condition Code 44 should be used and patient status changed to \"observation\".     RATIONALE FOR DETERMINATION    64-year-old male with a history of stage 4 rectal cancer, metastatic to the lungs, small bowel obstruction, ventral hernia, abdominal aneurysm post mszut-sryc-taqazqt bypass graft, tobacco abuse, and chronic back pain was admitted to the hospital on 4/17/2024 following advice from his oncologist. He recently completed radiation therapy and has experienced significant symptoms including a 21-pound unintentional weight loss since March, increased rectal bleeding noted to be associated with hemorrhoids, and generalized weakness. The patient denies nausea, vomiting, new or worsening pain, recent illness, fevers, chest pain, shortness of breath, and urinary symptoms but reports intermittent dizziness with positional changes. Concerns about the progression of his primary cancer were raised during his last oncology visit. In the emergency department, he received a 1L fluid bolus and was consulted by [facility] and nutrition services.  The severity of illness, intensity of service provided, expected LOS and risk for adverse outcome make the care appropriate for further observation; however, doesn't meet criteria for hospital inpatient admission. Dr Agrawal notified of this determination.    This document was produced using voice recognition " software.      The information on this document is developed by the utilization review team in order for the business office to ensure compliance.  This only denotes the appropriateness of proper admission status and does not reflect the quality of care rendered.         The definitions of Inpatient Status and Observation Status used in making the determination above are those provided in the CMS Coverage Manual, Chapter 1 and Chapter 6, section 70.4.      Sincerely,     DELMER NOONAN MD    System Medical Director  Utilization Management  [facility].

## 2024-04-18 NOTE — PLAN OF CARE
Goal Outcome Evaluation:    Summary:    Primary Diagnosis: failure to thrive, generalized weakness    Orientation: A&Ox4   Vitals/Tele: VSS on ra   IV Access/drains: PIV Sl   Diet: Reg   Mobility: A1 GB pivot to the C   GI/: Incontinent at times   Wound/Skin: scattered bruising, redness blanchable coccyx, open area on top of buttock   Discharge Plan: pending       See Flow sheets for assessment

## 2024-04-18 NOTE — PROGRESS NOTES
Admission/Transfer from: emergency department   2 RN skin assessment completed. Yes Bart Solorzano   Significant findings include: redness blanchable coccyx, moisture skin break at top of buttock   WOC Nurse Consult Ordered? put in a sticky note for providers to place one

## 2024-04-18 NOTE — CONSULTS
[facility] Consultation    Ronnie Herrera Jr. MRN# [id number]   YOB: 1960 Age: 64 year old   Date of Admission: 4/17/2024  Requesting physician: Dr. Bowser  Reason for consult: Metastatic rectal cancer. Failure to thrive.           Assessment and Plan:     Primary Oncologist: Dr. Dreyer    Metastatic (lungs) adenocarcinoma pMMR, KRAS G12C mutated, of the rectum. (March 2022)   -S/p chemo and SBRT to a pulmonary nodule (see Oncologic history below)  - 03/2024: recurrent rectal bleeding with exam suggesting progression of rectal tumor  - Now s/p chemoradiation with continued rectal bleeding (completed 4/16/24)    Failure to thrive  - Seen in clinic 4/17/24 with 21 pound weight loss over past six weeks.  Frequent falls at home due to weakness/lightheadedness.  Wife works during the days, and the patient has difficulty managing when she is away.    - poor appetite, taste aversions and overall weakness have led to weight loss and dehydration.   - IVF, nutrition consult  - physical therapy to strengthen pt and bridge gap until he begins to recover (expected improvement in 2-3 weeks).  - will likely need home care and PT on discharge.  - consider moving up restaging scans if pt does not improve as expected.      Peripheral neuropathy:   -was stable since oxal omitted in Aug 2022. Now on duloxetine. 3/28/24 he reports worsening of neuropathy, now difficult for him to walk at times. Feels like pins and needles or numbness.   Anxiety, on lorazepam about 3x/week using, stable   Has outpatient neurology appt later this week.     Rectal bleeding  - due to tumor +/- radiation.  Also with some component of hemorrhoid.  Unless bleeding escalates or hgb drops, would hold off on re-examination until radiation inflammation has time to recede  - hgb in clinic on 4/17/24 13.3 gm/dL      SARMAD Solis, AOCNP  Nurse Practitioner  [facility]  [phone]             Chief Complaint:   Generalized  Weakness         Oncologic history:   DX 02/2022 adenocarcinoma of the rectum  [facility] NGS  APC  ARIDIA  K-prosper G 12 C  PIK3CA  TCF/L2  TMB 7  Microsatellite stable  Low genomic loss of heterozygosity    He sought medical attention for bleeding per rectum and rectal pain.   3/24/22: CT ABD PELVIS  irregular wall thickening in the rectal wall  2.6 cm spiculated RLL mass  pulmonary nodules  4/1/22: rectal exam and flexible sigmoidoscopy, Dr. Welch  Mass beginning at the dentate line extending cephalad up the posterior rectal wall 5 cm.  It encompasses nearly half the circumference of the distal rectum.  Fungating ulcerated mass in the distal rectum which was biopsied.  Low-grade adenocarcinoma, DNA mismatch repair enzymes are intact by immunohistochemistry  4/5/2022: CT chest with IV contrast; PET/CT  2.6 x 2.3 cm right lower lobe pulmonary nodule  Approximately 20 bilateral pulmonary nodules largest of which is a centimeter  Primary rectal tumor, the right lower lobe mass and several of the smaller bilateral pulmonary nodules show FDG avidity  4/18/22: CT guided biopsy of a dominant RLL mass => strongly CDX2 + negative for TTF-1.    CEA 47 baseline    4/25/22-November 2022: FOLFOXIRI   good radiographic response after week 16.  oxaliplatin dropped August, 2022.  November 2022 - 1/10/24: Maintenance oral Xeloda and bevacizumab    6/16/23 received SBRT to R lung nodule.     1/10/24: Bevacizumab DCd for proteinuria    3/4/24: seen by Dr. Welch of [facility] for recurrent rectal bleeding where an exam showed tumor progressing in the distal anal canal.  There was some mixed hemorrhoidal disease but the bleeding and discomfort were felt due to the progression of the primary tumor.    3/8/24 CT CAP Showed a stable left lower lobe pulmonary nodule and a stable rectal mass, not appreciably changed since 1/29/2024 03/12/24 - 4/16/24 Rectal radiation with concurrent  capecitibine (Dr. Pressley)           Physical Exam:   Vitals were  reviewed  Blood pressure 134/79, pulse 57, temperature 98.3  F (36.8  C), temperature source Oral, resp. rate 16, weight 54.9 kg (121 lb), SpO2 100%.  Temperatures:  Current - Temp: 98.3  F (36.8  C); Max - Temp  Av.1  F (36.7  C)  Min: 97.9  F (36.6  C)  Max: 98.3  F (36.8  C)  Respiration range: Resp  Av.3  Min: 16  Max: 18  Pulse range: Pulse  Av  Min: 57  Max: 65  Blood pressure range: Systolic (24hrs), Av , Min:123 , Max:134   ; Diastolic (24hrs), Av, Min:71, Max:79    Pulse oximetry range: SpO2  Av.6 %  Min: 98 %  Max: 100 %    Intake/Output Summary (Last 24 hours) at 2024 1135  Last data filed at 2024 0900  Gross per 24 hour   Intake 243 ml   Output 100 ml   Net 143 ml       GENERAL: No acute distress. Weak  SKIN: No rashes or jaundice.  HEENT: Normocephalic, atraumatic. Eyes anicteric. Oropharynx is  dry, no lesions or thrush.  LYMPH: No palpable lymphadenopathy in the cervical, supraclavicular, axillary, or inguinal regions.  HEART: Regular rate and rhythm with no murmurs.  LUNGS: Clear bilaterally.  ABDOMEN: Soft, nontender, nondistended with no palpable hepatosplenomegaly.  EXTREMITIES: No clubbing, cyanosis, or edema.  MENTAL: Alert and oriented to person, place, and time.  .              Past Medical History:   I have reviewed this patient's past medical history  Past Medical History:   Diagnosis Date    Aortic aneurysm (H24)     Colon cancer (H)              Past Surgical History:   I have reviewed this patient's past surgical history  Past Surgical History:   Procedure Laterality Date    APPENDECTOMY      HERNIA REPAIR      IR LUNG BIOPSY MEDIASTINUM RIGHT  2022    VASCULAR SURGERY      aortic aneurysm               Social History:   I have reviewed this patient's social history  Social History     Tobacco Use    Smoking status: Every Day    Smokeless tobacco: Never   Substance Use Topics    Alcohol use: Not on file             Family History:   I have  reviewed this patient's family history  No family history on file.          Allergies:     Allergies   Allergen Reactions    Escitalopram Dizziness             Medications:   I have reviewed this patient's current medications  Medications Prior to Admission   Medication Sig Dispense Refill Last Dose    acetaminophen (TYLENOL) 325 MG tablet Take 325-650 mg by mouth as needed for pain    at PRN    capecitabine (XELODA) 500 MG tablet Take 3 tablets by mouth BID Monday-Friday during radiation   per patient, done with this as of 4/16 PM    LORazepam (ATIVAN) 1 MG tablet Take 1 mg by mouth nightly as needed for anxiety   Past Week at PRN    nicotine (NICODERM CQ) 14 MG/24HR 24 hr patch Place 1 patch onto the skin Every 24 hours PRN   Past Week at PRN     Current Facility-Administered Medications   Medication Dose Route Frequency Provider Last Rate Last Admin    acetaminophen (TYLENOL) tablet 650 mg  650 mg Oral Q4H PRN DenaeJackeline turner APRN CNP        Or    acetaminophen (TYLENOL) Suppository 650 mg  650 mg Rectal Q4H PRN Jackeline Philippe APRN CNP        benzocaine-menthol (CHLORASEPTIC) 6-10 MG lozenge 1 lozenge  1 lozenge Buccal Q1H PRN Jackeline Philippe APRN CNP        bisacodyl (DULCOLAX) suppository 10 mg  10 mg Rectal Daily PRN Jackeline Philippe APRN CNP        calcium carbonate (TUMS) chewable tablet 1,000 mg  1,000 mg Oral 4x Daily PRN Jackeline Philippe APRN CNP        hydrALAZINE (APRESOLINE) tablet 10 mg  10 mg Oral Q4H PRN Jackeline Philippe APRN CNP        Or    hydrALAZINE (APRESOLINE) injection 10 mg  10 mg Intravenous Q4H PRN Jackeline Philippe APRN CNP        HYDROmorphone (DILAUDID) injection 0.2 mg  0.2 mg Intravenous Q2H PRN Jackeline Philippe APRN CNP        HYDROmorphone (DILAUDID) injection 0.4 mg  0.4 mg Intravenous Q2H PRN Jackeline Philippe APRN CNP        lidocaine (LMX4) cream   Topical Q1H PRN Jackeline Philippe APRN CNP        lidocaine 1 % 0.1-1 mL  0.1-1 mL Other Q1H PRN Denae,  SARMAD Viveros CNP        LORazepam (ATIVAN) tablet 1 mg  1 mg Oral At Bedtime PRN Jackeline Philippe APRN CNP        melatonin tablet 1 mg  1 mg Oral At Bedtime PRN Jackeline Philippe APRN CNP        naloxone (NARCAN) injection 0.2 mg  0.2 mg Intravenous Q2 Min PRN Mague, [place]        Or    naloxone (NARCAN) injection 0.4 mg  0.4 mg Intravenous Q2 Min PRN Mague, [place]        Or    naloxone (NARCAN) injection 0.2 mg  0.2 mg Intramuscular Q2 Min PRN Mague, [place]        Or    naloxone (NARCAN) injection 0.4 mg  0.4 mg Intramuscular Q2 Min PRN Mague, [place]        nicotine (NICODERM CQ) 14 MG/24HR 24 hr patch 1 patch  1 patch Transdermal Daily Jackeline Philippe APRN CNP   1 patch at 04/18/24 0812    ondansetron (ZOFRAN ODT) ODT tab 4 mg  4 mg Oral Q6H PRN Jackeline Philippe APRN CNP        Or    ondansetron (ZOFRAN) injection 4 mg  4 mg Intravenous Q6H PRN Jackeline Philippe APRN CNP   4 mg at 04/17/24 1813    oxyCODONE (ROXICODONE) tablet 5 mg  5 mg Oral Q4H PRN Jackeline Philippe APRN CNP        oxyCODONE IR (ROXICODONE) half-tab 2.5 mg  2.5 mg Oral Q4H PRN Jackeline Philippe APRN CNP        polyethylene glycol (MIRALAX) Packet 17 g  17 g Oral BID PRN Jackeline Philippe APRN CNP        prochlorperazine (COMPAZINE) injection 5 mg  5 mg Intravenous Q6H PRN Jackeline Philippe APRN CNP        Or    prochlorperazine (COMPAZINE) tablet 5 mg  5 mg Oral Q6H PRN Jackeline Philippe APRN CNP        Or    prochlorperazine (COMPAZINE) suppository 12.5 mg  12.5 mg Rectal Q12H PRN Jackeline Philippe APRN CNP        senna-docusate (SENOKOT-S/PERICOLACE) 8.6-50 MG per tablet 1 tablet  1 tablet Oral BID PRN DenaeJackeline APRN CNP        Or    senna-docusate (SENOKOT-S/PERICOLACE) 8.6-50 MG per tablet 2 tablet  2 tablet Oral BID PRN DenaeJackeline APRN CNP        sodium chloride (PF) 0.9% PF flush 3 mL  3 mL Intracatheter Q8H Jackeline Philippe APRN CNP   3 mL at 04/18/24 0812    sodium chloride (PF) 0.9% PF flush 3  mL  3 mL Intracatheter q1 min prn Jackeline Philippe APRN CNP                 Review of Systems:     The 10 point Review of Systems is negative other than noted in the HPI.            Data:   Data   Results for orders placed or performed during the hospital encounter of 04/17/24 (from the past 24 hour(s))   CBC with Platelets & Differential    Narrative    The following orders were created for panel order CBC with Platelets & Differential.  Procedure                               Abnormality         Status                     ---------                               -----------         ------                     CBC with platelets and d...[[id number]]  Abnormal            Final result                 Please view results for these tests on the individual orders.   CBC with platelets and differential   Result Value Ref Range    WBC Count 3.6 (L) 4.0 - 11.0 10e3/uL    RBC Count 3.55 (L) 4.40 - 5.90 10e6/uL    Hemoglobin 11.7 (L) 13.3 - 17.7 g/dL    Hematocrit 35.0 (L) 40.0 - 53.0 %    MCV 99 78 - 100 fL    MCH 33.0 26.5 - 33.0 pg    MCHC 33.4 31.5 - 36.5 g/dL    RDW 14.6 10.0 - 15.0 %    Platelet Count 113 (L) 150 - 450 10e3/uL    % Neutrophils 79 %    % Lymphocytes 11 %    % Monocytes 9 %    % Eosinophils 1 %    % Basophils 0 %    % Immature Granulocytes 0 %    NRBCs per 100 WBC 0 <1 /100    Absolute Neutrophils 2.8 1.6 - 8.3 10e3/uL    Absolute Lymphocytes 0.4 (L) 0.8 - 5.3 10e3/uL    Absolute Monocytes 0.3 0.0 - 1.3 10e3/uL    Absolute Eosinophils 0.0 0.0 - 0.7 10e3/uL    Absolute Basophils 0.0 0.0 - 0.2 10e3/uL    Absolute Immature Granulocytes 0.0 <=0.4 10e3/uL    Absolute NRBCs 0.0 10e3/uL   CBC with platelets   Result Value Ref Range    WBC Count 4.2 4.0 - 11.0 10e3/uL    RBC Count 3.57 (L) 4.40 - 5.90 10e6/uL    Hemoglobin 11.6 (L) 13.3 - 17.7 g/dL    Hematocrit 34.7 (L) 40.0 - 53.0 %    MCV 97 78 - 100 fL    MCH 32.5 26.5 - 33.0 pg    MCHC 33.4 31.5 - 36.5 g/dL    RDW 14.6 10.0 - 15.0 %    Platelet Count 122 (L)  150 - 450 10e3/uL

## 2024-04-19 NOTE — PROGRESS NOTES
Progress Note     Primary Oncologist/Hematologist:  Dr. Dreyer          Assessment and Plan:New actions/orders today (04/19/2024) are underlined.     Metastatic (lungs) adenocarcinoma pMMR, KRAS G12C mutated, of the rectum. (March 2022)   - S/p chemo and SBRT to a pulmonary nodule (see Oncologic history below)  - 03/2024: recurrent rectal bleeding with exam suggesting progression of rectal tumor  - Now s/p chemoradiation with continued rectal bleeding (completed 4/16/24)  - Consideration is being given to start FOLFIRI +/- bevacizumab, but he needs to have improvement in performance status prior to starting next line chemotherapy  - Keep follow up with Dr. Dreyer on 4/25/2024 at 1:00     Failure to thrive  - Seen in clinic 4/17/24 with 21 pound weight loss over past six weeks.  Frequent falls at home due to weakness/lightheadedness.  Wife works during the days, and the patient has difficulty managing when she is away.    - poor appetite, taste aversions and overall weakness have led to weight loss and dehydration.   - IVF, nutrition consult  - physical therapy to strengthen pt and bridge gap until he begins to recover (expected improvement in 2-3 weeks).  - will likely need home care and PT on discharge.  - consider moving up restaging scans if pt does not improve as expected.       Peripheral neuropathy:   - Stable since oxaliplatin omitted in Aug 2022.   - Now on duloxetine.   - 3/28/24 he reports worsening of neuropathy, now difficult for him to walk at times. Feels like pins and needles or numbness.   - Has outpatient neurology appt later this week.     Rectal bleeding  - Due to tumor +/- radiation.  Also with some component of hemorrhoid.  Unless bleeding escalates or hgb drops, would hold off on re-examination until radiation inflammation has time to recede  - hgb in clinic on 4/17/24 13.3 gm/dL    Time spent: 25 minutes including face to face time as well as review of records from the hospital and  clinic    Justo BAÑUELOS CNP  [facility]  [phone] (office)         Interval History:     Feeling okay. Hopeful to go home.              Review of Systems:     The 5 point Review of Systems is negative other than noted in the HPI             Medications:   Scheduled Medications  Current Facility-Administered Medications   Medication Dose Route Frequency Provider Last Rate Last Admin    nicotine (NICODERM CQ) 14 MG/24HR 24 hr patch 1 patch  1 patch Transdermal Daily Jackeline Philippe APRN CNP   1 patch at 04/19/24 0826    sodium chloride (PF) 0.9% PF flush 3 mL  3 mL Intracatheter Q8H Jackeline Philippe APRN CNP   3 mL at 04/19/24 0827     PRN Medications  Current Facility-Administered Medications   Medication Dose Route Frequency Provider Last Rate Last Admin    acetaminophen (TYLENOL) tablet 650 mg  650 mg Oral Q4H PRN Jackeline Philippe APRN CNP        Or    acetaminophen (TYLENOL) Suppository 650 mg  650 mg Rectal Q4H PRN Jackeline Philippe APRN CNP        benzocaine-menthol (CHLORASEPTIC) 6-10 MG lozenge 1 lozenge  1 lozenge Buccal Q1H PRN Jackeline Philippe APRN CNP        bisacodyl (DULCOLAX) suppository 10 mg  10 mg Rectal Daily PRN Jackeline Philippe APRN CNP        calcium carbonate (TUMS) chewable tablet 1,000 mg  1,000 mg Oral 4x Daily PRN Jackeline Philippe APRN CNP        hydrALAZINE (APRESOLINE) tablet 10 mg  10 mg Oral Q4H PRN Jackeline Philippe APRN CNP        Or    hydrALAZINE (APRESOLINE) injection 10 mg  10 mg Intravenous Q4H PRN Jackeline Philippe APRN CNP        HYDROmorphone (DILAUDID) injection 0.2 mg  0.2 mg Intravenous Q2H PRN Jackeline Philippe APRN CNP        HYDROmorphone (DILAUDID) injection 0.4 mg  0.4 mg Intravenous Q2H PRN Jackeline Philippe APRN CNP        lidocaine (LMX4) cream   Topical Q1H PRN Jackeline Philippe APRN CNP        lidocaine 1 % 0.1-1 mL  0.1-1 mL Other Q1H PRN Jackeline Philippe APRN CNP        LORazepam (ATIVAN) tablet 1 mg  1 mg Oral At Bedtime  ROSAN Jackeline Philippe APRN CNP        melatonin tablet 1 mg  1 mg Oral At Bedtime PRN Jackeline Philippe APRN CNP        naloxone (NARCAN) injection 0.2 mg  0.2 mg Intravenous Q2 Min PRN [place]ague, [place]        Or    naloxone (NARCAN) injection 0.4 mg  0.4 mg Intravenous Q2 Min PRN Mague, [place]        Or    naloxone (NARCAN) injection 0.2 mg  0.2 mg Intramuscular Q2 Min PRN Mague, [place]        Or    naloxone (NARCAN) injection 0.4 mg  0.4 mg Intramuscular Q2 Min PRN [place]ague, [place]        ondansetron (ZOFRAN ODT) ODT tab 4 mg  4 mg Oral Q6H PRN Jackeline Philippe APRN CNP        Or    ondansetron (ZOFRAN) injection 4 mg  4 mg Intravenous Q6H PRN Jackeline Philippe APRN CNP   4 mg at 04/17/24 1813    oxyCODONE (ROXICODONE) tablet 5 mg  5 mg Oral Q4H PRN Jackeline Philippe APRN CNP        oxyCODONE IR (ROXICODONE) half-tab 2.5 mg  2.5 mg Oral Q4H PRN Jackeline Philippe APRN CNP        polyethylene glycol (MIRALAX) Packet 17 g  17 g Oral BID PRN Jackeline Philippe APRN CNP        prochlorperazine (COMPAZINE) injection 5 mg  5 mg Intravenous Q6H PRN Jackeline Philippe APRN CNP        Or    prochlorperazine (COMPAZINE) tablet 5 mg  5 mg Oral Q6H PRN Jackeline Philippe APRN CNP        Or    prochlorperazine (COMPAZINE) suppository 12.5 mg  12.5 mg Rectal Q12H PRN Jackeline Philippe APRN CNP        senna-docusate (SENOKOT-S/PERICOLACE) 8.6-50 MG per tablet 1 tablet  1 tablet Oral BID PRN Jackeline Philippe APRN CNP        Or    senna-docusate (SENOKOT-S/PERICOLACE) 8.6-50 MG per tablet 2 tablet  2 tablet Oral BID PRN Jackeline Philippe APRN CNP        sodium chloride (PF) 0.9% PF flush 3 mL  3 mL Intracatheter q1 min prn Jackeline Philippe APRN CNP                      Physical Exam:   Vitals were reviewed  Blood pressure 129/77, pulse 58, temperature 98.4  F (36.9  C), temperature source Oral, resp. rate 18, weight 50 kg (110 lb 4.4 oz), SpO2 99%.  Wt Readings from Last 4 Encounters:   04/18/24 50 kg (110 lb 4.4 oz)    03/08/24 57.6 kg (127 lb)   02/08/24 63.5 kg (140 lb)   12/24/18 74.8 kg (165 lb)       I/O last 3 completed shifts:  In: 1457 [P.O.:480; I.V.:977]  Out: 220 [Urine:220]    Constitutional: Awake, alert, cooperative, no apparent distress            Data:   All laboratory data and imaging studies reviewed.    CMP  Recent Labs   Lab 04/19/24  0826 04/18/24  1054 04/17/24  1037   NA  --  137 140   POTASSIUM 4.1 3.8 3.9   CHLORIDE  --  103 102   CO2  --  27 24   ANIONGAP  --  7 14   GLC  --  120* 114*   BUN  --  14.8 21.5   CR  --  0.76 0.93   GFRESTIMATED  --  >90 >90   MANSOOR  --  8.9 9.4   MAG 1.8 2.1 2.2   PHOS 3.0 2.8 3.0   PROTTOTAL  --   --  6.8   ALBUMIN  --   --  3.9   BILITOTAL  --   --  0.8   ALKPHOS  --   --  87   AST  --   --  15   ALT  --   --  9     CBC  Recent Labs   Lab 04/18/24  1054 04/17/24  1828   WBC 4.2 3.6*   RBC 3.57* 3.55*   HGB 11.6* 11.7*   HCT 34.7* 35.0*   MCV 97 99   MCH 32.5 33.0   MCHC 33.4 33.4   RDW 14.6 14.6   * 113*     INRNo lab results found in last 7 days.  Data   Results for orders placed or performed during the hospital encounter of 04/17/24 (from the past 24 hour(s))   Basic metabolic panel   Result Value Ref Range    Sodium 137 135 - 145 mmol/L    Potassium 3.8 3.4 - 5.3 mmol/L    Chloride 103 98 - 107 mmol/L    Carbon Dioxide (CO2) 27 22 - 29 mmol/L    Anion Gap 7 7 - 15 mmol/L    Urea Nitrogen 14.8 8.0 - 23.0 mg/dL    Creatinine 0.76 0.67 - 1.17 mg/dL    GFR Estimate >90 >60 mL/min/1.73m2    Calcium 8.9 8.8 - 10.2 mg/dL    Glucose 120 (H) 70 - 99 mg/dL   CBC with platelets   Result Value Ref Range    WBC Count 4.2 4.0 - 11.0 10e3/uL    RBC Count 3.57 (L) 4.40 - 5.90 10e6/uL    Hemoglobin 11.6 (L) 13.3 - 17.7 g/dL    Hematocrit 34.7 (L) 40.0 - 53.0 %    MCV 97 78 - 100 fL    MCH 32.5 26.5 - 33.0 pg    MCHC 33.4 31.5 - 36.5 g/dL    RDW 14.6 10.0 - 15.0 %    Platelet Count 122 (L) 150 - 450 10e3/uL   Magnesium   Result Value Ref Range    Magnesium 2.1 1.7 - 2.3 mg/dL    Phosphorus   Result Value Ref Range    Phosphorus 2.8 2.5 - 4.5 mg/dL   Iron and iron binding capacity   Result Value Ref Range    Iron 47 (L) 61 - 157 ug/dL    Iron Binding Capacity 243 240 - 430 ug/dL    Iron Sat Index 19 15 - 46 %   Potassium   Result Value Ref Range    Potassium 4.1 3.4 - 5.3 mmol/L   Magnesium   Result Value Ref Range    Magnesium 1.8 1.7 - 2.3 mg/dL   Phosphorus   Result Value Ref Range    Phosphorus 3.0 2.5 - 4.5 mg/dL

## 2024-04-19 NOTE — UTILIZATION REVIEW
Concurrent stay review; Secondary Review Determination - [facility]USTOD[facility]    [facility]        Under the authority of the Utilization Management Committee, the utilization review process indicated a secondary review on the above patient.  The review outcome is based on review of the medical records, discussions with staff, and applying clinical experience noted on the date of the review.        (x) Outpatient FDC status is appropriate       RATIONALE FOR DETERMINATION:     I reviewed and I agree with the UR note and observation determination done by Dr. Wilson yesterday.    The patient remained stable, vital signs are stable electrolytes and creatinine are stable hemoglobin just mildly low at 10.4 compared with yesterday at 11.6, no signs of bleeding.  Cardiology and the patient agreed that no further imaging at this time and the current failure to thrive is related with the chemoradiation for progressive bladder cancer.  The patient is not a candidate for further chemotherapy at this time.      Physical therapy recommended TCU or home with the wife is able to provide care    Patient delayed discharge is related to disposition, there is no medical necessity for inpatient admission at the time of this review. If there is a change in patient status, please resend for review.    The information on this document is developed by the utilization review team in order for the business office to ensure compliance.  This only denotes the appropriateness of proper admission status and does not reflect the quality of care rendered.       The definitions of Inpatient Status and Observation Status used in making the determination above are those provided in the CMS Coverage Manual, Chapter 1 and Chapter 6, section 70.4.       Sincerely,     VINCENT MCCOY MD   Utilization Review  Physician Advisor  [facility]

## 2024-04-19 NOTE — PLAN OF CARE
04/19/[id number]     Summary: Admitted on 04/17 w/ failure to thrive and generalized weakness     Primary Diagnosis: Stage 4 rectal cancer with metastatic involvement to the lungs,     Orientation: AOX4     Activity: A-1 GB/W     Diet/BS Checks: regular     Tele:  NA     IV Access/Drains: R PIV infusing LR@100 ml/hr     Pain Management: Oxycodone, IV Hydromorphone     Abnormal VS/Results: VSS on RA     Bowel/Bladder: Continent, using urinal at bedside. Incontinent of bowel      Skin/Wounds: Redness at bottom with small peeling/cracking     Consults: hem/onc, nutrition, PT     D/C Disposition: TBD     Other Info:  K/Mag/Phosph protocol  -Recheck AM. Had 3 BM in this shift, no bleeding noted.  Nicotine patch on L shoulder. Pt is very weak, offered to go for a walk, pt refused. PT worked with pt today.

## 2024-04-19 NOTE — PROGRESS NOTES
04/19/24 1300   Appointment Info   Signing Clinician's Name / Credentials (PT) Elizabeth Robert DPT   Quick Adds   Quick Adds Certification   Living Environment   People in Home spouse   Current Living Arrangements house   Home Accessibility stairs to enter home   Number of Stairs, Main Entrance 5   Stair Railings, Main Entrance railings on both sides of stairs   Transportation Anticipated family or friend will provide;car, drives self   Living Environment Comments Lives with wife in house [address], can stay on one level   Self-Care   Usual Activity Tolerance moderate   Current Activity Tolerance poor   Regular Exercise No   Equipment Currently Used at Home none   Fall history within last six months yes   Number of times patient has fallen within last six months 2   Activity/Exercise/Self-Care Comment Poor historian. Wife helps with bathing, dressing. Wants to start using a walker.   General Information   Onset of Illness/Injury or Date of Surgery 04/17/24   Referring Physician Jackeline Philippe APRN CNP   Patient/Family Therapy Goals Statement (PT) Go home   Pertinent History of Current Problem (include personal factors and/or comorbidities that impact the POC) Ronnie Herrera Jr. is a 64 year old male admitted on 4/17/2024. He has a past medical history significant for stage 4 rectal cancer, small bowel obstruction, ventral hernia, abdominal aneurysm s/p aorto ileo-femoral bypass graft (2016), tobacco abuse and chronic back pain. He was seen by his oncologist earlier today who advised him to come to the hospital for admission.   Existing Precautions/Restrictions fall   Cognition   Affect/Mental Status (Cognition) low arousal/lethargic   Orientation Status (Cognition) oriented x 4   Follows Commands (Cognition) follows one-step commands   Pain Assessment   Patient Currently in Pain Yes, see Vital Sign flowsheet  (Rectal)   Integumentary/Edema   Integumentary/Edema Comments Age related changes   Posture    Posture  Forward head position   Range of Motion (ROM)   Range of Motion ROM is WFL   Strength (Manual Muscle Testing)   Strength (Manual Muscle Testing) Deficits observed during functional mobility   Bed Mobility   Comment, (Bed Mobility) Effortful, SBA   Transfers   Comment, (Transfers) Moses FWW   Gait/Stairs (Locomotion)   Comment, (Gait/Stairs) Moses FWW, toe walks on RLE, short step to strides.   Balance   Balance Comments Moses FWW   Sensory Examination   Sensory Perception Comments neuropathy in RLE   Clinical Impression   Criteria for Skilled Therapeutic Intervention Yes, treatment indicated   PT Diagnosis (PT) impaired gait   Influenced by the following impairments neuropathy, cognition, lethargy, weakness   Functional limitations due to impairments fall risk   Clinical Presentation (PT Evaluation Complexity) stable   Clinical Presentation Rationale clinical judgement   Clinical Decision Making (Complexity) low complexity   Planned Therapy Interventions (PT) balance training;bed mobility training;cryotherapy;gait training;home exercise program;ROM (range of motion);stair training;strengthening;stretching;transfer training   Risk & Benefits of therapy have been explained evaluation/treatment results reviewed;care plan/treatment goals reviewed;risks/benefits reviewed;current/potential barriers reviewed;participants voiced agreement with care plan;participants included;patient   PT Total Evaluation Time   PT Eval, Low Complexity Minutes (42282) 7   Therapy Certification   Start of care date 04/19/24   Certification date from 04/19/24   Certification date to 04/26/24   Medical Diagnosis FTT   Physical Therapy Goals   PT Frequency 5x/week   PT Predicted Duration/Target Date for Goal Attainment 04/26/24   PT Goals Bed Mobility;Transfers;Gait;Stairs   PT: Bed Mobility Independent;Supine to/from sit;Rolling;Bridging   PT: Transfers Independent;Sit to/from stand;Bed to/from chair;Assistive device   PT: Gait  Supervision/stand-by assist;Assistive device;50 feet   PT: Stairs Supervision/stand-by assist;5 stairs;Rail on both sides   Interventions   Interventions Quick Adds Gait Training;Therapeutic Activity   Therapeutic Activity   Therapeutic Activities: dynamic activities to improve functional performance Minutes (68109) 9   Symptoms Noted During/After Treatment Fatigue   Treatment Detail/Skilled Intervention Pt bedside, very lethargic and requires multiple cues to stay awake for session. Transferred EOB SBA very slow and effortful, heavy use of bed rails. At EOB /73, deniess dizziness. Sit<>STand with heavy cues for walker safety Moses. Pt does not carryover cues for pushing from chair versus pulling walker throughout session. Amb 10+15+10+15 Moses with close chair follow, constant cues for safe walker use, stride length and tall posture. Pt unable to carryover. Required multiple seated rest breaks d/t fatigue.   Gait Training   Gait Training Minutes (74769) 15   Symptoms Noted During/After Treatment (Gait Training) fatigue   Treatment Detail/Skilled Intervention Amb 10+15+10+15 Moses with close chair follow, constant cues for safe walker use, stride length and tall posture. Pt unable to carryover. Required multiple seated rest breaks d/t fatigue.   PT Discharge Planning   PT Plan Progress gait with close chair follow   PT Discharge Recommendation (DC Rec) Transitional Care Facility   PT Rationale for DC Rec Unclear PLOF though pt currently Moses with FWW, appears very limited by lethargy and has poor safety awareness. Rec TCU to progress mobility. If wife is able to provide Moses for all mobility and pt can complete stairs, than consider home though pt may require higher level of care d/t incontinence and confusion.   PT Brief overview of current status Moses 1-2   PT Equipment Needed at Discharge walker, rolling   Total Session Time   Timed Code Treatment Minutes 24   Total Session Time (sum of timed and untimed  services) 31   [facility]  OUTPATIENT PHYSICAL THERAPY EVALUATION  PLAN OF TREATMENT FOR OUTPATIENT REHABILITATION  (COMPLETE FOR INITIAL CLAIMS ONLY)  Patient's Last Name, First Name, M.I.  YOB: 1960  Ronnie Herrera                        Provider's Name  [facility] Medical Record No.  [id number]                             Onset Date:  04/17/24   Start of Care Date:  04/19/24   Type:     _X_PT   ___OT   ___SLP Medical Diagnosis:  FTT              PT Diagnosis:  impaired gait Visits from SOC:  1     See note for plan of treatment, functional goals and certification details    I CERTIFY THE NEED FOR THESE SERVICES FURNISHED UNDER        THIS PLAN OF TREATMENT AND WHILE UNDER MY CARE     (Physician co-signature of this document indicates review and certification of the therapy plan).

## 2024-04-19 NOTE — PROVIDER NOTIFICATION
MD Notification    Notified Person: MD    Notified Person Name:Dr. Gunjan Agrawal    Notification Date/Time:04/19@1319    Notification Interaction:Vocera    Purpose of Notification:Pt is getting more weak and tired. Do you want to check his Hgb today.    Orders Received:CBC ordered    Comments:

## 2024-04-19 NOTE — PROGRESS NOTES
[facility]    Hospitalist Progress Note    Date of Service (when I saw the patient): 04/18/2024  Admit date: 4/17/2024    Interval History   Full details of events over last 24 hours outlined below.   Ronnie Herrera Jr. is a 64 year old male admitted on 4/17/2024. He has a past medical history significant for stage 4 rectal cancer, small bowel obstruction, ventral hernia, abdominal aneurysm s/p aorto ileo-femoral bypass graft (2016), tobacco abuse and chronic back pain. He was seen by his oncologist earlier today who advised him to come to the hospital for admission.      Failure to thrive  Generalized weakness   Metastatic (lungs) adenocarcinoma pMMR, KRAS G12C mutated, of the rectum. (March 2022)   Recent rectal bleeding with progression of rectal cancer   *Follows with Dr. Patrick Dreyer at [facility], last seen 4/17/2024  * S/p chemo and SBRT to a pulmonary nodule (see Oncologic history below)  * 03/2024: recurrent rectal bleeding with exam suggesting progression of rectal tumor  * Now s/p chemoradiation with continued rectal bleeding (completed 4/16/24)  * Reports decreased appetite, weakness, with unintentional 21lb weight loss and increased bleeding with bowel movements since March.     Discussed with oncology and frankly discussed with patient.    Current failure to thrive is either from recent chemoradiation or progression of cancer.  Given he is not a candidate for chemotherapy at this time due to low functional status there is no urgency in obtaining scans to evaluate for cancer progression.  Therefore both patient and oncology have decided to hold on further imaging  Given decreased intake will give IV fluids today.  Otherwise no evidence of significant dehydration by vitals and labs.   PT consulted for discharge planning given his weakness    Ongoing rectal bleeding secondary to progression of rectal cancer  Acute normocytic anemia (11.7 on admission, 15.5 on  2/8/2024)  Mild leukopenia and thrombocytopenia suspect secondary to recent chemo  All counts stable today  Check iron panel and ferritin  Follow up hgb in the AM.     Recent Labs   Lab 04/18/24  1054 04/17/24  1828   WBC 4.2 3.6*   HGB 11.6* 11.7*   * 113*       Severe malnutrition in context of above, 21# weight loss  Nutrition consulted     H/o SBO  H/o Ventral hernia, repair 2017  *Reports decreased frequency of bowel movements recently.   *No active signs or symptoms of SBO.   - Continue to monitor     Peripheral neuropathy secondary to chemotherapy.   This has progressed.  It is difficult to walk he feels pins-and-needles or numbness.    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 Currently smoking 1 pack per day. Uses nicotine patches whether he has been actively smoking or not.   - Continue nicotine replacement     Assessment & Plan     Clinically Significant Risk Factors Present on Admission                # Thrombocytopenia: Lowest platelets = 113 in last 2 days, will monitor for bleeding                     Diet: Orders Placed This Encounter      Regular Diet Adult     IVF: Started lactated Ringer's at 100 cc/h  DVT Prophylaxis: PCD's and ambulate  Jo Catheter: Not present    Full Code  Disposition: Admitted under observation, anticipate discharge to TCU or home following PT evaluation  PT/OT pending  Communication: Discussed with oncology and patient to coordinate above plan on 04/18/24    Gunjan Agrawal MD    Hospitalist Service  [facility]  Securely message with the Vocera Web Console (learn more here)  Text page via [employer] Paging/Directory      -Data reviewed today: I reviewed all new labs and imaging results over the last 24 hours. I personally reviewed no images or EKG's today.    Physical Exam   Temp: 98.3  F (36.8  C) Temp src: Oral BP: 130/70  Pulse: 68   Resp: 16 SpO2: 99 % O2 Device: None (Room air)    Vitals:    04/17/24 1521 04/18/24 1633   Weight: 54.9 kg (121 lb) 50 kg (110 lb 4.4 oz)     Vital Signs with Ranges  Temp:  [97.9  F (36.6  C)-98.3  F (36.8  C)] 98.3  F (36.8  C)  Pulse:  [57-68] 68  Resp:  [16-18] 16  BP: (130-134)/(70-79) 130/70  SpO2:  [98 %-100 %] 99 %  I/O last 3 completed shifts:  In: 246 [P.O.:240; I.V.:6]  Out: 220 [Urine:220]    Today's Exam  Constitutional:  NAD, cachectic  Neuropsyche: Pleasant affect, alert and oriented, answers questions appropriately.  Good eye contact  Respiratory:  Breathing comfortably, good air exchange, no wheezes, no crackles.   Cardiovascular:  Regular rate and rhythm, no edema.  GI:  soft, NT/ND, BS normal  Skin/Integumen:  No acute rash or sign of bleeding.     Medications   All medications reviewed on 04/18/24      Current Facility-Administered Medications   Medication Dose Route Frequency Provider Last Rate Last Admin    lactated ringers infusion   Intravenous Continuous Gunjan Agrawal  mL/hr at 04/18/24 1550 New Bag at 04/18/24 1550     Current Facility-Administered Medications   Medication Dose Route Frequency Provider Last Rate Last Admin    nicotine (NICODERM CQ) 14 MG/24HR 24 hr patch 1 patch  1 patch Transdermal Daily Jackeline Philippe APRN CNP   1 patch at 04/18/24 0812    sodium chloride (PF) 0.9% PF flush 3 mL  3 mL Intracatheter Q8H Jackeline Philippe APRN CNP   3 mL at 04/18/24 1405     PRN Meds:   Current Facility-Administered Medications   Medication Dose Route Frequency Provider Last Rate Last Admin    acetaminophen (TYLENOL) tablet 650 mg  650 mg Oral Q4H PRN Jackeline Philippe APRN CNP        Or    acetaminophen (TYLENOL) Suppository 650 mg  650 mg Rectal Q4H PRN Jackeline Philippe APRN CNP        benzocaine-menthol (CHLORASEPTIC) 6-10 MG lozenge 1 lozenge  1 lozenge Buccal Q1H PRN Jackeline Philippe APRN CNP        bisacodyl (DULCOLAX) suppository 10 mg  10 mg Rectal  Daily PRN Jackeline Philippe APRN CNP        calcium carbonate (TUMS) chewable tablet 1,000 mg  1,000 mg Oral 4x Daily PRN Jackeline Philippe APRN CNP        hydrALAZINE (APRESOLINE) tablet 10 mg  10 mg Oral Q4H PRN Jackeline Philippe APRN CNP        Or    hydrALAZINE (APRESOLINE) injection 10 mg  10 mg Intravenous Q4H PRN Jackeline Philippe APRN CNP        HYDROmorphone (DILAUDID) injection 0.2 mg  0.2 mg Intravenous Q2H PRN Jackeline Philippe APRN CNP        HYDROmorphone (DILAUDID) injection 0.4 mg  0.4 mg Intravenous Q2H PRN Jackeline Philippe APRN CNP        lidocaine (LMX4) cream   Topical Q1H PRN Jackeline Philippe APRN CNP        lidocaine 1 % 0.1-1 mL  0.1-1 mL Other Q1H PRN Jackeline Philippe APRN CNP        LORazepam (ATIVAN) tablet 1 mg  1 mg Oral At Bedtime PRN Jackeline Philippe APRN CNP        melatonin tablet 1 mg  1 mg Oral At Bedtime PRN Jackeline Philippe APRN CNP        naloxone (NARCAN) injection 0.2 mg  0.2 mg Intravenous Q2 Min PRN Mague, [place]        Or    naloxone (NARCAN) injection 0.4 mg  0.4 mg Intravenous Q2 Min PRN Mague, [place]        Or    naloxone (NARCAN) injection 0.2 mg  0.2 mg Intramuscular Q2 Min PRN Mague, [place]        Or    naloxone (NARCAN) injection 0.4 mg  0.4 mg Intramuscular Q2 Min PRN Mague, [place]        ondansetron (ZOFRAN ODT) ODT tab 4 mg  4 mg Oral Q6H PRN Jackeline Philippe APRN CNP        Or    ondansetron (ZOFRAN) injection 4 mg  4 mg Intravenous Q6H PRN Jackeline Philippe APRN CNP   4 mg at 04/17/24 1813    oxyCODONE (ROXICODONE) tablet 5 mg  5 mg Oral Q4H PRN Jackeline Philippe APRN CNP        oxyCODONE IR (ROXICODONE) half-tab 2.5 mg  2.5 mg Oral Q4H PRN Jackeline Philippe APRN CNP        polyethylene glycol (MIRALAX) Packet 17 g  17 g Oral BID PRN Jackeline Philippe APRN CNP        prochlorperazine (COMPAZINE) injection 5 mg  5 mg Intravenous Q6H PRN Jackeline Philippe APRN CNP        Or    prochlorperazine (COMPAZINE) tablet 5 mg  5 mg Oral Q6H PRN  Jackeline Philippe APRN CNP        Or    prochlorperazine (COMPAZINE) suppository 12.5 mg  12.5 mg Rectal Q12H PRN Jackeline Philippe APRN CNP        senna-docusate (SENOKOT-S/PERICOLACE) 8.6-50 MG per tablet 1 tablet  1 tablet Oral BID PRN Jackeline Philippe APRN CNP        Or    senna-docusate (SENOKOT-S/PERICOLACE) 8.6-50 MG per tablet 2 tablet  2 tablet Oral BID PRN Jackeline Pihlippe APRN CNP        sodium chloride (PF) 0.9% PF flush 3 mL  3 mL Intracatheter q1 min prn Jackeline Philippe APRN CNP           Data   Recent Labs   Lab 04/18/24  1054 04/17/24  1828 04/17/24  1037   WBC 4.2 3.6*  --    HGB 11.6* 11.7*  --    MCV 97 99  --    * 113*  --      --  140   POTASSIUM 3.8  --  3.9   CHLORIDE 103  --  102   CO2 27  --  24   BUN 14.8  --  21.5   CR 0.76  --  0.93   ANIONGAP 7  --  14   MANSOOR 8.9  --  9.4   *  --  114*   ALBUMIN  --   --  3.9   PROTTOTAL  --   --  6.8   BILITOTAL  --   --  0.8   ALKPHOS  --   --  87   ALT  --   --  9   AST  --   --  15       No results found for this or any previous visit (from the past 24 hour(s)).

## 2024-04-19 NOTE — PLAN OF CARE
[id number]  Orientation: a&ox4  Activity: a1 gb/w to BSC  Diet/BS Checks: regular  Tele:  n/a  IV Access/Drains: R PIV infusing LR @ 100 mL/hr  Pain Management: denies  Abnormal VS/Results: VSS on RA. K/Mag/phos recheck in AM  Bowel/Bladder: cont B/B. Urinal at bedside. Uses BSC  Skin/Wounds: redness on bottom. Bruising on hands  Consults: heme/onc, nutrition, PT  D/C Disposition: pending  Other Info: nicotine patch R shoulder

## 2024-04-20 NOTE — PLAN OF CARE
[id number]     Summary: Admitted on 04/17 w/ failure to thrive and generalized weakness     Primary Diagnosis: Stage 4 rectal cancer with metastatic involvement to the lungs,     Orientation: AOX4     Activity: A-1 GB/W     Diet/BS Checks: regular     Tele:  HALEY     IV Access/Drains: R PIV -SL     Pain Management: Oxycodone, IV Hydromorphone     Abnormal VS/Results: VSS on RA     Bowel/Bladder: Continent, using urinal at bedside. Incontinent of bowel      Skin/Wounds: Redness at bottom with small peeling/cracking     Consults: hem/onc, nutrition, PT     D/C Disposition: TBD-TCU.     Other Info:  K/Mag/Phosph protocol  -Recheck AM.  Nicotine patch on R shoulder. Pt is very weak. Encouraging food and fluids. After pericare, CNA noted some abnormal seizure type movements on his face, checked by CN and writer, noted R sided facial droop, garbled speech,  RRT activated, they activated code stroke, sent for CT scan, according to CT scan, plan to transfer to ICU for further management. Updated to wife by NP.  After discussion with spouse, decided put him on comfort care.

## 2024-04-20 NOTE — CONSULTS
Care Management Initial Consult    General Information  Assessment completed with: Patient, VM-chart review, Ronnie  Type of CM/SW Visit: Initial Assessment    Primary Care Provider verified and updated as needed:     Readmission within the last 30 days:        Reason for Consult: discharge planning  Advance Care Planning: None on file           Communication Assessment  Patient's communication style: spoken language (English or Bilingual)    Hearing Difficulty or Deaf: no   Wear Glasses or Blind: yes    Cognitive  Cognitive/Neuro/Behavioral: WDL  Level of Consciousness: alert  Arousal Level: opens eyes spontaneously     Mood/Behavior: calm, cooperative  Best Language: 0 - No aphasia  Speech: clear, spontaneous, logical    Living Environment:   People in home: spouse     Current living Arrangements: house      Able to return to prior arrangements: yes       Family/Social Support:  Care provided by: self  Provides care for: no one  Marital Status:   Wife          Description of Support System: Supportive, Involved    Support Assessment: Adequate family and caregiver support, Adequate social supports    Current Resources:   Patient receiving home care services: No     Community Resources: None  Equipment currently used at home: none  Supplies currently used at home: None    Employment/Financial:  Employment Status: other (see comments) (Employed but currently on short term disability.)        Financial Concerns: none   Referral to Financial Worker: No       Does the patient's insurance plan have a 3 day qualifying hospital stay waiver?  No    Lifestyle & Psychosocial Needs:  Social Determinants of Health     Food Insecurity: Not on file   Depression: Not at risk (3/3/2023)    [facility] from [facility], [facility]    PHQ-2     PHQ-2 TOTAL SCORE: 0   Housing Stability: Not on file   Tobacco Use: High Risk (3/4/2024)    Received from [facility], [facility] [place]    Patient History     Smoking Tobacco Use: Every Day     Smokeless Tobacco Use: Never     Passive Exposure: Past   Financial Resource Strain: High Risk (1/1/2022)    Received from [facility] [place], [facility] [place]    Financial Resource Strain     Difficulty of Paying Living Expenses: Not on file     Difficulty of Paying Living Expenses: Not on file   Alcohol Use: Not on file   Transportation Needs: Not on file   Physical Activity: Not on file   Interpersonal Safety: Not on file   Stress: Not on file   Social Connections: Unknown (1/1/2022)    Received from [facility] [place], [facility] [place]    Social Connections     Frequency of Communication with Friends and Family: Not on file   Health Literacy: Not on file       Functional Status:  Prior to admission patient needed assistance:    Patient lives independently with his spouse in two story home. Patient has not received any services or support in the past. He is currently on short term disability from his job.           Mental Health Status:      Dx of Anxiety    Chemical Dependency Status:                Values/Beliefs:  Spiritual, Cultural Beliefs, Adventist Practices, Values that affect care:                 Additional Information:   consulted per care management/social work consult for discharge planning and TCU placement on discharge.  Patient is a 64 year old male that was admitted on 4/17/2024 with a primary diagnosis of rectal cancer, generalized weakness.  The tentative date of discharge is 4/21/2024.   reviewed chart and spoke with patient to discuss discharge plans.  Per patient's report, patient lives with his wife in a two story home.  Patient is independent at baseline.   reviewed the therapy discharge recommendations of TCU  placement on discharge and patient is in agreement.  Patient would prefer for patient to be as close to [facility] as possible.  Patient is asking for referrals to be sent to [facility].  Referrals sent, via discharge on the double, to check bed availability.     LEXIE Horn

## 2024-04-20 NOTE — PROGRESS NOTES
[facility]    Hospitalist Progress Note    Date of Service (when I saw the patient): 04/19/2024  Admit date: 4/17/2024    Interval History   Full details of events over last 24 hours outlined below.   Per nursing stool has been brown. No change  He continues to have very little appetite and has not been seen by nutrition yet (confirmed consult placed 4/17)   PT saw him this afternoon and recommending TCU is in agreement with this.     Assessment and Plan  Ronnie Herrera Jr. is a 64 year old male admitted on 4/17/2024. He has a past medical history significant for stage 4 rectal cancer, small bowel obstruction, ventral hernia, abdominal aneurysm s/p aorto ileo-femoral bypass graft (2016), tobacco abuse and chronic back pain. He was seen by his oncologist earlier today who advised him to come to the hospital for admission.      Failure to thrive  Generalized weakness   Metastatic (lungs) adenocarcinoma pMMR, KRAS G12C mutated, of the rectum. (March 2022)   Recent rectal bleeding with progression of rectal cancer   *Follows with Dr. Patrick Dreyer at [facility], last seen 4/17/2024  * S/p chemo and SBRT to a pulmonary nodule (see Oncologic history below)  * 03/2024: recurrent rectal bleeding with exam suggesting progression of rectal tumor  * Now s/p chemoradiation with continued rectal bleeding (completed 4/16/24)  * Reports decreased appetite, weakness, with unintentional 21lb weight loss and increased bleeding with bowel movements since March.     Discussed with oncology and frankly discussed with patient.    Current failure to thrive is either from recent chemoradiation or progression of cancer.  Given he is not a candidate for chemotherapy at this time due to low functional status there is no urgency in obtaining scans to evaluate for cancer progression.  Therefore both patient and oncology have decided to hold on further imaging  Discussed with oncology on 04/19/24. No change  in plan.   Given decreased intake, gave fluids x 1 L.  Otherwise no evidence of significant dehydration by vitals and labs.   PT consulted for discharge planning given his weakness  On oxycodone PRN for pain. Note today used oxycodone 5 mg x 1 and dilaudid 0.4 mg IV x 1 for the first time this stay. (No new c/o pain during my visit.)     Ongoing rectal bleeding, stable secondary to progression of rectal cancer  Acute normocytic anemia (11.7 on admission, 15.5 on 2/8/2024)  Mild leukopenia and thrombocytopenia suspect secondary to recent chemo  All counts stable today  No evidence of iron deficiency on Iron panel on 4/18/24  Follow up hgb in the AM.     Recent Labs   Lab 04/19/24  1350 04/18/24  1054 04/17/24  1828   WBC 5.5 4.2 3.6*   HGB 10.4* 11.6* 11.7*   * 122* 113*       Severe malnutrition in context of above, 21# weight loss  Nutrition consulted on 4/17, pending.   Placed another consult order on 04/19/24      H/o SBO  H/o Ventral hernia, repair 2017  *Reports decreased frequency of bowel movements recently.   *No active signs or symptoms of SBO.     Peripheral neuropathy secondary to chemotherapy.   This has progressed.  It is difficult to walk he feels pins-and-needles or numbness.  Per oncology, he is on duloxetine for this but I do not see this on his PTA med list or recent dispense history.     Anxiety  Uses Ativan about 3 times a week PRN.      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 Currently smoking 1 pack per day. Uses nicotine patches whether he has been actively smoking or not.   - Continue nicotine replacement     Assessment & Plan     Clinically Significant Risk Factors Present on Admission                # Thrombocytopenia: Lowest platelets = 113 in last 2 days, will monitor for bleeding        # Cachexia: Estimated body mass index is 15.38 kg/m  as calculated from the following:    Height as of this encounter:  "1.803 m (5' 11\").    Weight as of this encounter: 50 kg (110 lb 4.4 oz).                Diet: Orders Placed This Encounter      Regular Diet Adult     IVF: Started lactated Ringer's at 100 cc/h  DVT Prophylaxis: PCD's and ambulate  Jo Catheter: Not present    Full Code  Disposition: Admitted under observation, TCU recommended. CM consulted on 04/19/24.   PT/OT pending  Communication: Discussed with oncology and patient to coordinate above plan on 04/18/24    Gunjan Agrawal MD    Hospitalist Service  [facility]  Securely message with the Vocera Web Console (learn more here)  Text page via Attributor Paging/Directory      -Data reviewed today: I reviewed all new labs and imaging results over the last 24 hours. I personally reviewed no images or EKG's today.    Physical Exam   Temp: 98.1  F (36.7  C) Temp src: Oral BP: 116/70 Pulse: 69   Resp: 18 SpO2: 98 % O2 Device: None (Room air)    Vitals:    04/17/24 1521 04/18/24 1633   Weight: 54.9 kg (121 lb) 50 kg (110 lb 4.4 oz)     Vital Signs with Ranges  Temp:  [98.1  F (36.7  C)-98.4  F (36.9  C)] 98.1  F (36.7  C)  Pulse:  [58-69] 69  Resp:  [18] 18  BP: (116-138)/(70-77) 116/70  SpO2:  [98 %-99 %] 98 %  I/O last 3 completed shifts:  In: 1274 [P.O.:300; I.V.:974]  Out: 450 [Urine:450]    Today's Exam  Constitutional:  NAD, cachectic  Neuropsyche: Pleasant affect, alert and oriented, answers questions appropriately.  Good eye contact  Respiratory:  Breathing comfortably, good air exchange, no wheezes, no crackles.   Cardiovascular:  Regular rate and rhythm, no edema.  GI:  soft, NT/ND, BS normal  Skin/Integumen:  No acute rash or sign of bleeding.     Medications   All medications reviewed on 04/18/24      Current Facility-Administered Medications   Medication Dose Route Frequency Provider Last Rate Last Admin     Current Facility-Administered Medications   Medication Dose Route Frequency Provider Last Rate Last Admin    nicotine (NICODERM CQ) " 14 MG/24HR 24 hr patch 1 patch  1 patch Transdermal Daily Jackeline Philippe APRN CNP   1 patch at 04/19/24 0826    sodium chloride (PF) 0.9% PF flush 3 mL  3 mL Intracatheter Q8H Jackeline Philippe APRN CNP   3 mL at 04/19/24 1151     PRN Meds:   Current Facility-Administered Medications   Medication Dose Route Frequency Provider Last Rate Last Admin    acetaminophen (TYLENOL) tablet 650 mg  650 mg Oral Q4H PRN Jackeline Philippe APRN CNP        Or    acetaminophen (TYLENOL) Suppository 650 mg  650 mg Rectal Q4H PRN Jackeline Philippe APRN CNP        benzocaine-menthol (CHLORASEPTIC) 6-10 MG lozenge 1 lozenge  1 lozenge Buccal Q1H PRN Jackeline Philippe APRN CNP        bisacodyl (DULCOLAX) suppository 10 mg  10 mg Rectal Daily PRN Jackeline Philippe APRN CNP        calcium carbonate (TUMS) chewable tablet 1,000 mg  1,000 mg Oral 4x Daily PRN Jackeline Philippe APRN CNP        hydrALAZINE (APRESOLINE) tablet 10 mg  10 mg Oral Q4H PRN Jackeline Philippe APRN CNP        Or    hydrALAZINE (APRESOLINE) injection 10 mg  10 mg Intravenous Q4H PRN Jackeline Philippe APRN CNP        HYDROmorphone (DILAUDID) injection 0.2 mg  0.2 mg Intravenous Q2H PRN Jackeline Philippe APRN CNP        HYDROmorphone (DILAUDID) injection 0.4 mg  0.4 mg Intravenous Q2H PRN Jackeline Philippe APRN CNP   0.4 mg at 04/19/24 1310    lidocaine (LMX4) cream   Topical Q1H PRN Jackeline Philippe APRN CNP        lidocaine 1 % 0.1-1 mL  0.1-1 mL Other Q1H PRN Jackeline Philippe APRN CNP        LORazepam (ATIVAN) tablet 1 mg  1 mg Oral At Bedtime PRN Jackeline Philippe APRN CNP        melatonin tablet 1 mg  1 mg Oral At Bedtime PRN Jackeline Philippe APRN CNP        naloxone (NARCAN) injection 0.2 mg  0.2 mg Intravenous Q2 Min PRN Mague, [place]        Or    naloxone (NARCAN) injection 0.4 mg  0.4 mg Intravenous Q2 Min PRN Mague, [place]        Or    naloxone (NARCAN) injection 0.2 mg  0.2 mg Intramuscular Q2 Min PRN Mague, [place]        Or     naloxone (NARCAN) injection 0.4 mg  0.4 mg Intramuscular Q2 Min PRN Mague Natalieabelkrystle        ondansetron (ZOFRAN ODT) ODT tab 4 mg  4 mg Oral Q6H PRN Jackeline Philippe APRN CNP        Or    ondansetron (ZOFRAN) injection 4 mg  4 mg Intravenous Q6H PRN Jackeline Philippe APRN CNP   4 mg at 04/17/24 1813    oxyCODONE (ROXICODONE) tablet 5 mg  5 mg Oral Q4H PRN Jackeline Philippe APRN CNP   5 mg at 04/19/24 1154    oxyCODONE IR (ROXICODONE) half-tab 2.5 mg  2.5 mg Oral Q4H PRN Jackeline Philippe APRN CNP        polyethylene glycol (MIRALAX) Packet 17 g  17 g Oral BID PRN Jackeline Philippe APRN CNP        prochlorperazine (COMPAZINE) injection 5 mg  5 mg Intravenous Q6H PRN Jackeline Philippe APRN CNP        Or    prochlorperazine (COMPAZINE) tablet 5 mg  5 mg Oral Q6H PRN Jackeline Philippe APRN CNP        Or    prochlorperazine (COMPAZINE) suppository 12.5 mg  12.5 mg Rectal Q12H PRN Jackeline Philippe APRN CNP        senna-docusate (SENOKOT-S/PERICOLACE) 8.6-50 MG per tablet 1 tablet  1 tablet Oral BID PRN Jackeline Philippe APRN CNP        Or    senna-docusate (SENOKOT-S/PERICOLACE) 8.6-50 MG per tablet 2 tablet  2 tablet Oral BID PRN Jackeline Philippe APRN CNP        sodium chloride (PF) 0.9% PF flush 3 mL  3 mL Intracatheter q1 min prn Jackeline Philippe APRN CNP           Data   Recent Labs   Lab 04/19/24  1350 04/19/24  0826 04/18/24  1054 04/17/24  1828 04/17/24  1037   WBC 5.5  --  4.2 3.6*  --    HGB 10.4*  --  11.6* 11.7*  --    MCV 97  --  97 99  --    *  --  122* 113*  --    NA  --   --  137  --  140   POTASSIUM  --  4.1 3.8  --  3.9   CHLORIDE  --   --  103  --  102   CO2  --   --  27  --  24   BUN  --   --  14.8  --  21.5   CR  --   --  0.76  --  0.93   ANIONGAP  --   --  7  --  14   MANSOOR  --   --  8.9  --  9.4   GLC  --   --  120*  --  114*   ALBUMIN  --   --   --   --  3.9   PROTTOTAL  --   --   --   --  6.8   BILITOTAL  --   --   --   --  0.8   ALKPHOS  --   --   --   --  87   ALT  --   --   --    --  9   AST  --   --   --   --  15       No results found for this or any previous visit (from the past 24 hour(s)).

## 2024-04-20 NOTE — CONSULTS
CLINICAL NUTRITION SERVICES - BRIEF NOTE     Nutrition Prescription    Recommendations already ordered by Registered Dietitian (RD):  Chocolate Ensure TID with meals (with ice cream shake at lunch and dinner)  Cherry Gel+ BID @ 10-2 snacks     NEW FINDINGS   Provider team reached out to RD for supplements. See Hospitalist note today (4/20) for more details    INTERVENTIONS  Implementation  Medical food supplement therapy

## 2024-04-20 NOTE — PLAN OF CARE
4045 - 4051    Orientation: A&Ox4, forgetful at times    Activity: A1 w/GB+W    Diet/BS Checks: Regular    Tele:  N/A    IV Access/Drains: R PIV SL    Pain Management: Denies pain    Abnormal VS/Results: VSS on RA    Bowel/Bladder: Incontinent at times, no BM overnight, urinal at bedside    Skin/Wounds: Bruising to BUE, wound to coccyx - mepilex in place    Consults: PT, Hem/Onc    D/C Disposition: Pending improvement    Other Info: K+, Mg, and Phos protocols, nicotine patch to L shoulder

## 2024-04-20 NOTE — CONSULTS
[facility]    Stroke Telephone Note    I was called by Umu Bowser on 04/20/24 regarding patient Ronnie Herrera .. The patient is a 64 year old man admitted on 4/17/2024 for failure to thrive and generalized weakness with medical history of stage 4 rectal cancer, small bowel obstruction, ventral hernia, abdominal aneurysm s/p aorto ileo-femoral bypass graft (2016), tobacco abuse and chronic back pain.     Stroke code was activated for Right sided weakness and aphasia after a witnessed seizure around 18:03 today. A load of Keppra 2 g was given    Vitals  BP: (!) 152/67   Pulse: 65   Resp: 16   Temp: 97.7  F (36.5  C)   Weight: 50 kg (110 lb 4.4 oz)    Stroke Code Data (for stroke code without tele)  Stroke code activated 04/20/24 1808   Stroke provider first response 04/20/24  1809   Last known normal 04/20/24  1803      Time of discovery (or onset of symptoms) 04/20/24 1803   Head CT read by Stroke Neuro Provider 04/20/24  1826   Was stroke code de-escalated? No           Imaging Findings  CT head: Multifocal IPH (left frontal lobe, temporal) with associated vasogenic edema concerning for hemorrhagic mets. 5 mm rightward midline shift of the septum pellucidum due to the left frontal lesions and edema as well as early or mild subfalcine herniation of the left frontal lobe towards the right.   CTA head/neck: No LVO or critical stenoses    Intravenous Thrombolysis  Not given due to:   - active bleeding    Endovascular Treatment  Not initiated due to absence of proximal vessel occlusion    Impression  Multifocal intraparenchymal hemorrhages concerning for metastases    Recommendations   Initially advised for Neurosurgery STAT consult, and ICU transfer for q 2 hr neuro checks with goal SBP <160 with plans to start dexamethasone but house ERG confirmed with wife of the patient that she would prefer he was transitioned to comfort care. Plan to continue Keppra (750 mg BID) to avoid  "seizures.    My recommendations are based on the information provided over the phone by Ronnie Herrear Jr.'s in-person providers. They are not intended to replace the clinical judgment of his in-person providers. I was not requested to personally see or examine the patient at this time.     The Stroke Staff is Dr. Parr.    Jenae Duncan MD  Vascular Neurology Fellow    To page me or covering stroke neurology team member, click here: [email or web address]  Choose \"On Call\" tab at top, then select \"NEUROLOGY/ALL SITES\" from middle drop-down box, press Enter, then look for \"stroke\" or \"telestroke\" for your site.   "

## 2024-04-20 NOTE — PROGRESS NOTES
[facility]    Hospitalist Progress Note    Date of Service (when I saw the patient): 04/20/2024  Admit date: 4/17/2024    Interval History   Full details of events over last 24 hours outlined below.   No change in rectal bleeding, hgb up today.   PT saw him this afternoon and recommending TCU. Patient agrees.   Awaiting nutrition consult. They do not normally see observation patients but this was the primary reason Oncology gave for him to be admitted and so I think we should do it in this case.   Wife at bedside. All discussed goals of care, plan and expectations.     Assessment and Plan  Ronnie Herrera Jr. is a 64 year old male admitted on 4/17/2024. He has a past medical history significant for stage 4 rectal cancer, small bowel obstruction, ventral hernia, abdominal aneurysm s/p aorto ileo-femoral bypass graft (2016), tobacco abuse and chronic back pain. He was seen by his oncologist earlier today who advised him to come to the hospital for admission.      Failure to thrive  Generalized weakness   Metastatic (lungs) adenocarcinoma pMMR, KRAS G12C mutated, of the rectum. (March 2022)   Recent rectal bleeding with progression of rectal cancer   *Follows with Dr. Patrick Dreyer at [facility], last seen 4/17/2024  * S/p chemo and SBRT to a pulmonary nodule (see Oncologic history below)  * 03/2024: recurrent rectal bleeding with exam suggesting progression of rectal tumor  * Now s/p chemoradiation with continued rectal bleeding (completed 4/16/24)  * Reports decreased appetite, weakness, with unintentional 21lb weight loss and increased bleeding with bowel movements since March.     Discussed with oncology and frankly discussed with patient and wife on 04/20/24.  Current failure to thrive is either from recent chemoradiation or progression of cancer.  Given he is not a candidate for chemotherapy at this time due to low functional status there is no urgency in obtaining scans to  evaluate for cancer progression.  Therefore both patient and oncology have decided to hold on further imaging  Discussed with oncology on 04/19/24. No change in plan.   On 04/20/24 wife at bedside and discussed code status with both he and his wife. I explained that resuscitation attempts, if successful are more likely to prolong a dying process rather that bring him back to quality of life. We discussed the alternative of a peaceful passing without aggressive interventions. Both he and his wife agreed that he would like to be DNR/DNI.   Given decreased intake, gave fluids x 1 L.  Otherwise no evidence of significant dehydration by vitals and labs.   PT consulted > recommended TCU > CM consulted for placement.   On oxycodone PRN for pain. Very rare use. None today 04/20/24.     Ongoing rectal bleeding, stable secondary to progression of rectal cancer  Acute normocytic anemia (11.7 on admission, 15.5 on 2/8/2024)  Mild leukopenia and thrombocytopenia suspect secondary to recent chemo  All counts stable today  No evidence of iron deficiency on Iron panel on 4/18/24  Follow up hgb in the AM.     Recent Labs   Lab 04/20/24  0959 04/19/24  1350 04/18/24  1054 04/17/24  1828   WBC  --  5.5 4.2 3.6*   HGB 11.5* 10.4* 11.6* 11.7*   PLT  --  113* 122* 113*       Severe malnutrition in context of above, 21# weight loss  Discussed with nutrition and they will see him today. I have asked them to make an exception for this patient under observation, as it one of the primary reasons Oncology sent him over.       H/o SBO  H/o Ventral hernia, repair 2017  *Reports decreased frequency of bowel movements recently.   *No active signs or symptoms of SBO.     Peripheral neuropathy secondary to chemotherapy.   This has progressed.  It is difficult to walk he feels pins-and-needles or numbness.  Per oncology, he is on duloxetine for this but I do not see this on his PTA med list or recent dispense history, and per patient he never  "actually started this medication.   Defer mgt to oncology.     Anxiety  Uses Ativan about 3 times a week PRN.      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 Currently smoking 1 pack per day. Uses nicotine patches whether he has been actively smoking or not.   Continue nicotine replacement     Assessment & Plan     Clinically Significant Risk Factors Present on Admission                # Thrombocytopenia: Lowest platelets = 113 in last 2 days, will monitor for bleeding        # Cachexia: Estimated body mass index is 15.38 kg/m  as calculated from the following:    Height as of this encounter: 1.803 m (5' 11\").    Weight as of this encounter: 50 kg (110 lb 4.4 oz).                Diet: Orders Placed This Encounter      Regular Diet Adult     IVF: Started lactated Ringer's at 100 cc/h  DVT Prophylaxis: PCD's and ambulate  Jo Catheter: Not present    Full Code  Disposition: Admitted under observation, TCU recommended. CM consulted on 04/19/24.   PT/OT pending  Communication: Discussed with oncology and patient to coordinate above plan on 04/18/24    Gunjan Agrawal MD    Hospitalist Service  [facility]  Securely message with the [employer] Web Console (learn more here)  Text page via [employer] Paging/Directory    High complexity due to goals of care discussion with change in code status.     -Data reviewed today: I reviewed all new labs and imaging results over the last 24 hours. I personally reviewed no images or EKG's today.    Physical Exam   Temp: 98.9  F (37.2  C) Temp src: Oral BP: 128/82 Pulse: 62   Resp: 16 SpO2: 98 % O2 Device: None (Room air)    Vitals:    04/17/24 1521 04/18/24 1633   Weight: 54.9 kg (121 lb) 50 kg (110 lb 4.4 oz)     Vital Signs with Ranges  Temp:  [98.1  F (36.7  C)-98.9  F (37.2  C)] 98.9  F (37.2  C)  Pulse:  [62-69] 62  Resp:  [16-18] 16  BP: (116-139)/(70-82) 128/82  SpO2:  [98 %-99 %] 98 " %  I/O last 3 completed shifts:  In: 63 [P.O.:60; I.V.:3]  Out: 800 [Urine:800]    Today's Exam  Constitutional:  NAD, cachectic  Neuropsyche: Pleasant affect, alert and oriented, answers questions appropriately.  Good eye contact  Respiratory:  Breathing comfortably, good air exchange, no wheezes, no crackles.   Cardiovascular:  Regular rate and rhythm, no edema.  GI:  soft, NT/ND, BS normal  Skin/Integumen:  No acute rash or sign of bleeding.     Medications   All medications reviewed on 04/20/24        Current Facility-Administered Medications   Medication Dose Route Frequency Provider Last Rate Last Admin     Current Facility-Administered Medications   Medication Dose Route Frequency Provider Last Rate Last Admin    nicotine (NICODERM CQ) 14 MG/24HR 24 hr patch 1 patch  1 patch Transdermal Daily Jackeline Philippe APRN CNP   1 patch at 04/20/24 0817    sodium chloride (PF) 0.9% PF flush 3 mL  3 mL Intracatheter Q8H Jackeline Philippe APRN CNP   3 mL at 04/19/24 2048     PRN Meds:   Current Facility-Administered Medications   Medication Dose Route Frequency Provider Last Rate Last Admin    acetaminophen (TYLENOL) tablet 650 mg  650 mg Oral Q4H PRN Jackeline Philippe APRN CNP        Or    acetaminophen (TYLENOL) Suppository 650 mg  650 mg Rectal Q4H PRN Jackeline Philippe APRN CNP        benzocaine-menthol (CHLORASEPTIC) 6-10 MG lozenge 1 lozenge  1 lozenge Buccal Q1H PRN Jackeline Philippe APRN CNP        bisacodyl (DULCOLAX) suppository 10 mg  10 mg Rectal Daily PRN Jackeline Philippe APRN CNP        calcium carbonate (TUMS) chewable tablet 1,000 mg  1,000 mg Oral 4x Daily PRN Jackeline Philippe APRN CNP        hydrALAZINE (APRESOLINE) tablet 10 mg  10 mg Oral Q4H PRN Jackeline Philippe APRN CNP        Or    hydrALAZINE (APRESOLINE) injection 10 mg  10 mg Intravenous Q4H PRN Jackeline Philippe APRN CNP        HYDROmorphone (DILAUDID) injection 0.2 mg  0.2 mg Intravenous Q2H PRN Jackeline Philippe APRN CNP         HYDROmorphone (DILAUDID) injection 0.4 mg  0.4 mg Intravenous Q2H PRN Jackeline Philippe APRN CNP   0.4 mg at 04/19/24 1310    lidocaine (LMX4) cream   Topical Q1H PRN Jackeline Philippe APRN CNP        lidocaine 1 % 0.1-1 mL  0.1-1 mL Other Q1H PRN Jackeline Philippe APRN CNP        LORazepam (ATIVAN) tablet 1 mg  1 mg Oral At Bedtime PRN Jackeline Philippe APRN CNP        melatonin tablet 1 mg  1 mg Oral At Bedtime PRN Jackeline Philippe APRN CNP        naloxone (NARCAN) injection 0.2 mg  0.2 mg Intravenous Q2 Min PRN Mague, [place]        Or    naloxone (NARCAN) injection 0.4 mg  0.4 mg Intravenous Q2 Min PRN Mague, [place]        Or    naloxone (NARCAN) injection 0.2 mg  0.2 mg Intramuscular Q2 Min PRN Mague, [place]        Or    naloxone (NARCAN) injection 0.4 mg  0.4 mg Intramuscular Q2 Min PRN Mague, [place]        ondansetron (ZOFRAN ODT) ODT tab 4 mg  4 mg Oral Q6H PRN Jackeline Philippe APRN CNP        Or    ondansetron (ZOFRAN) injection 4 mg  4 mg Intravenous Q6H PRN Jackeline Philippe APRN CNP   4 mg at 04/17/24 1813    oxyCODONE (ROXICODONE) tablet 5 mg  5 mg Oral Q4H PRN Jackeline Philippe APRN CNP   5 mg at 04/19/24 1154    oxyCODONE IR (ROXICODONE) half-tab 2.5 mg  2.5 mg Oral Q4H PRN Jackeline Philippe APRN CNP        polyethylene glycol (MIRALAX) Packet 17 g  17 g Oral BID PRN Jackeline Philippe APRN CNP        prochlorperazine (COMPAZINE) injection 5 mg  5 mg Intravenous Q6H PRN Jackeline Philippe APRN CNP        Or    prochlorperazine (COMPAZINE) tablet 5 mg  5 mg Oral Q6H PRN Jackeline Philippe APRN CNP        Or    prochlorperazine (COMPAZINE) suppository 12.5 mg  12.5 mg Rectal Q12H PRN Jackeline Philippe APRN CNP        senna-docusate (SENOKOT-S/PERICOLACE) 8.6-50 MG per tablet 1 tablet  1 tablet Oral BID PRN Jackeline Philippe APRN CNP        Or    senna-docusate (SENOKOT-S/PERICOLACE) 8.6-50 MG per tablet 2 tablet  2 tablet Oral BID PRN Jackeline Philippe APRN CNP        sodium  chloride (PF) 0.9% PF flush 3 mL  3 mL Intracatheter q1 min prn Jackeline Philippe, SARMAD ZAVALA           Data   Recent Labs   Lab 04/20/24  0959 04/19/24  1350 04/19/24  0826 04/18/24  1054 04/17/24  1828 04/17/24  1037 04/17/24  1037   WBC  --  5.5  --  4.2 3.6*  --   --    HGB 11.5* 10.4*  --  11.6* 11.7*   < >  --    MCV  --  97  --  97 99  --   --    PLT  --  113*  --  122* 113*  --   --    NA  --   --   --  137  --   --  140   POTASSIUM 3.7  --  4.1 3.8  --   --  3.9   CHLORIDE  --   --   --  103  --   --  102   CO2  --   --   --  27  --   --  24   BUN  --   --   --  14.8  --   --  21.5   CR  --   --   --  0.76  --   --  0.93   ANIONGAP  --   --   --  7  --   --  14   MANSOOR  --   --   --  8.9  --   --  9.4   GLC  --   --   --  120*  --   --  114*   ALBUMIN  --   --   --   --   --   --  3.9   PROTTOTAL  --   --   --   --   --   --  6.8   BILITOTAL  --   --   --   --   --   --  0.8   ALKPHOS  --   --   --   --   --   --  87   ALT  --   --   --   --   --   --  9   AST  --   --   --   --   --   --  15    < > = values in this interval not displayed.       No results found for this or any previous visit (from the past 24 hour(s)).

## 2024-04-20 NOTE — PLAN OF CARE
Orientation: A&Ox4, forgetful     Activity: Ax1 GBW     Diet/BS Checks: Regular, room service     Tele: n/a    IV Access/Drains: RPIV CDI SL     Pain Management: Denied     Abnormal VS/Results: VSS on RA. K/Mag/Phos rechecks in AM. Hgb 10.4    Bowel/Bladder: Incontinent of BM, uses bedside urinal     Skin/Wounds: Blanchable redness to sacrum     Consults: CM and Nutrition consults ordered, Hem/Onc, PT    D/C Disposition: pending     Other Info: PT recommending TCU

## 2024-04-20 NOTE — CODE/RAPID RESPONSE
[facility]    Code Stroke  House Officer Note    ////////////////////////////////////////////////////////////////////////////////////////////////////////////////////////////////  Acute stroke evaluation:  Time of arrival: 6:05 PM   Time called: 6:08 PM   Glucose level 122   Time patient seen by neurology: 610 p.m.   Time onset of stroke symptoms / witnessed onset: 6:03 PM   Time last known well: 6:00 PM   IV tPA admistered: No   IA / Thrombolectomy NA   Stroke Presentation Type Inhouse Stroke   Stroke Thrombolytic Ineligible Reason Systemic Bleed / Trauma, SAH or Intracranial   Stroke Thrombolytic Treatments NA   Neurologists Dr. Duncan   Stroke Type of Vascular Event Intracranial Hemorrhage   Pre TPa Wts entered? No   Post TPa VS Neuro Checks No      IV tPA:  Patient was not treated with rt-TP due to the following reason(s):  Hemorrhage of any degree on CT     IA thrombolectomy:  No IA intervention was performed due to lack of large vessel occlusion      [employer] Stroke Scale  Exam Interval: Baseline   Score    Level of consciousness: (0)   Alert, keenly responsive    LOC questions: (1)   Answers one question correctly    LOC commands: (0)   Performs both tasks correctly    Best gaze: (0)   Normal    Visual: (0)   No visual loss    Facial palsy: (1)   Minor paralysis (flat nasolabial fold, smile asymmetry)    Motor arm (left): (0)   No drift    Motor arm (right): (1)   Drift    Motor leg (left): (0)   No drift    Motor leg (right): (0)   No drift    Limb ataxia: (1)   Present in one limb    Sensory: (0)   Normal- no sensory loss    Best language: (1)   Mild to moderate aphasia    Dysarthria: (1)   Mild to moderate dysarthria    Extinction and inattention: (0)   No abnormality        Total Score:  6       Imaging:  Recent Results (from the past 24 hour(s))   CT Head w/o Contrast    Narrative    EXAM: CT HEAD W/O CONTRAST  LOCATION: [facility]  DATE: 4/20/2024    INDICATION: Altered mental status, history of lung cancer.  COMPARISON: None available.  TECHNIQUE: Routine CT Head without IV contrast. Multiplanar reformats. Dose reduction techniques were used.    FINDINGS: There are presumed intra-axial metastases in the left frontal lobe and cerebellar vermis. The largest lesion is in the high posterior left frontal lobe measuring 3.8 x 2.0 x 3.5 cm in the greatest oblique AP, oblique transverse and   cephalocaudad dimensions respectively. The smaller left frontal lesion measures 9 mm diameter. The vermian lesion measures 3.7 x 3.3 x 3.0 cm in the AP, transverse and cephalocaudad dimensions respectively. There is significant vasogenic edema in the   left frontal lobe and midline cerebellum.    Mass effect due to the left frontal lesions results in 5 mm rightward midline shift of the septum pellucidum and early or mild subfalcine herniation of the left frontal lobe towards the right. No evidence for transtentorial herniation. No hydrocephalus.   No evidence for ischemic infarct. No intracranial hemorrhage.      Impression    IMPRESSION:  1. Presumed intra-axial metastases in the left frontal lobe and cerebellar vermis as detailed above.  2. Moderate vasogenic edema surrounding the lesions in the left frontal lobe and cerebellum.  3. 5 mm rightward midline shift of the septum pellucidum due to the left frontal lesions and edema as well as early or mild subfalcine herniation of the left frontal lobe towards the right.  4. No evidence for intracranial hemorrhage, hydrocephalus or recent infarct.   CTA Head Neck with Contrast    Narrative    EXAM: CTA HEAD NECK W CONTRAST  LOCATION: [facility]  DATE: 4/20/2024    INDICATION: Altered mental status, history of lung cancer.  COMPARISON: None.  CONTRAST: 70 mL [id number].  TECHNIQUE: Head and neck CT angiogram with IV contrast. Axial helical CT images of the head and neck vessels  obtained during the arterial phase of intravenous contrast administration. Axial 2D reconstructed images and multiplanar 3D MIP reconstructed   images of the head and neck vessels were performed by the technologist. Dose reduction techniques were used. All stenosis measurements made according to NASCET criteria unless otherwise specified.    FINDINGS:   HEAD CTA:  ANTERIOR CIRCULATION: No stenosis/occlusion, aneurysm, or high flow vascular malformation. Fetal origin of the right posterior cerebral artery from the anterior circulation.    POSTERIOR CIRCULATION: No stenosis/occlusion, aneurysm, or high flow vascular malformation. Dominant right and smaller left vertebral artery contribute to a normal basilar artery.     DURAL VENOUS SINUSES: Expected enhancement of the major dural venous sinuses.    NECK CTA:  CAROTIDS: There is calcified plaque at the origins of the internal carotid arteries on both sides that does not result in any significant vascular narrowing on either side. The bilateral common carotid and bilateral cervical internal carotid arteries are   otherwise patent and unremarkable.    VERTEBRAL ARTERIES: No focal stenosis or dissection. Dominant right and smaller left vertebral arteries.    AORTIC ARCH: Classic aortic arch anatomy with no significant stenosis at the origin of the great vessels.    NONVASCULAR STRUCTURES: Unremarkable.      Impression    IMPRESSION:   HEAD CTA:   1.  Normal CTA [place].    NECK CTA:  1.  Plaque without stenosis at the internal carotid origins bilaterally.  2.  Otherwise, normal neck CTA.       Physical Exam   Vital Signs with Ranges:  Temp:  [97.7  F (36.5  C)-98.9  F (37.2  C)] 97.7  F (36.5  C)  Pulse:  [62-67] 65  Resp:  [16-17] 16  BP: (128-174)/(67-86) 152/67  SpO2:  [98 %-99 %] 98 %    Assessment & Plan     Witnessed seizure activity, Right facial droop, dysarthria, right arm weakness, and ataxia secondary to vasogenic edema in setting of brain metastases    Prior to RRT patient was being assisted back to bed from the bathroom.  Shortly after patient got back into bed patient had a blank stare, was not responding, and had jaw thrusting, teeth clenching, and leaning towards the right side.  Upon arrival patient is ill-appearing, alert, slow to respond.  He appears postictal, no active seizure activity.  Initially unable to answer questions, but his speech is slurred.  He has a visible right facial droop. On exam he has right upper extremity weakness and  positive drift.  Code stroke activated, and patient sent immediately for head CT.    Stroke risk factors  Hypertension  Malignancy    INTERVENTIONS:  - 2nd PIV placed  - 2g Keppra load followed by 750 mg daily maintenance dose  -CT head w/o contrast: Intra-axial metastases in left frontal lobe and cerebral vermis, moderate vasogenic edema surrounding lesions, 5 mm right upward midline shift of the septum due to left frontal lesions and edema as well as early or mild herniation of left frontal lobe towards the right  -CTA head and neck shows no large vessel occlusion  -Initially discussed with stroke neurology Dr. Duncan, who recommended transferring patient to ICU, stat neurosurgery consult, in addition to other treatments for cerebral edema.  -Goals of care: Spoke with patient's wife Yennifer over the phone regarding head CT findings remarkable for brain metastases with cerebra edema in midline shift.  We discussed the options including escalating care and transferring to the ICU with neurosurgery consult to see if he would be a surgical candidate.  We also talked about the possibility of patient having recurrent seizures, despite being medicated with Keppra.  We discussed that if patient develop status epilepticus treatment for that would include a breathing tube with heavy sedation.  After thorough discussion she reconfirmed that patient would not want to proceed with aggressive measures.  She expressed that patient's  cancer has progressed, and is continuing to progress.  She confirms that she does not want to escalate cares to ICU.  She wants patient to transition to comfort cares.  She areas on her way into the hospital.  -Updated Dr. Emanuel with stroke neurology regarding patient's wife's decision to transition patient to comfort care.    At end of evaluation patient is alert, still has right facial droop and dysarthria. He is resting comfortably in bed. He confirms he would not want aggressive measures, and that he would want to be comfortable. Patient will remain on station 88.     Interval History     Ronnie Herrera Jr. is a 64 year old male who was admitted on 4/17/2024 for failure to thrive in setting of metastatic adenocarcinoma.    Medical history significant for: stage IV rectal cancer, severe malnutrition, SBO, peripheral neuropathy, and anxiety.    Code Status: No CPR- Do NOT Intubate    Physical Exam   Constitutional: chronic ill appearance. Post-ictal state, eyes open, slow to respond, speech dysarthric.  HEENT: Right facial droop. EOMs intact. PERRLA.  Neck: Supple  Pulmonary: Non-labored breathing pattern  Cardiovascular: Sinus rhythm on tele.  Skin/Integumen: No rashes or lesions on exposed skin.  Psych:  Calm, cooperative  Extremities: Right upper extremity weakness and ataxia.    Data   Recent Labs   Lab 04/20/24  1852 04/20/24  0959 04/19/24  1350 04/18/24  1054   WBC 4.8  --  5.5 4.2   HGB 11.3* 11.5* 10.4* 11.6*   MCV 97  --  97 97   *  --  113* 122*   INR 0.98  --   --   --          Time Spent on this Encounter   I spent 60 minutes of critical care time on the unit/floor managing the care of Ronnie Herrera Jr.. Upon evaluation, this patient had a high probability of imminent or life-threatening deterioration due to vasogenic cerebral edema resulting in seizure, which required my direct attention, intervention, and personal management. 100% of my time was spent at the bedside counseling the  patient and/or coordinating care regarding services listed in this note.    Roland Chavez NP  [facility]  Securely message with the [employer] Web Console (learn more here)  Text page via [employer] Paging/Directory

## 2024-04-20 NOTE — PROGRESS NOTES
Progress Note     Primary Oncologist/Hematologist:  Dr. Dreyer          Assessment and Plan:New actions/orders today (04/20/2024) are underlined.     Metastatic (lungs) adenocarcinoma pMMR, KRAS G12C mutated, of the rectum. (March 2022)   - S/p chemo and SBRT to a pulmonary nodule (see Oncologic history below)  - 03/2024: recurrent rectal bleeding with exam suggesting progression of rectal tumor  - Now s/p chemoradiation with continued rectal bleeding (completed 4/16/24)  - Consideration is being given to start FOLFIRI +/- bevacizumab, but he needs to have improvement in performance status prior to starting next line chemotherapy  - Keep follow up with Dr. Dreyer on 4/25/2024 at 1:00     Failure to thrive  - Seen in clinic 4/17/24 with 21 pound weight loss over past six weeks.  Frequent falls at home due to weakness/lightheadedness.  Wife works during the days, and the patient has difficulty managing when she is away.    - poor appetite, taste aversions and overall weakness have led to weight loss and dehydration.   - IVF, nutrition consult  - physical therapy to strengthen pt and bridge gap until he begins to recover (expected improvement in 2-3 weeks).  - will likely need home care and PT on discharge.     Peripheral neuropathy:   - Stable since oxaliplatin omitted in Aug 2022.   - Now on duloxetine.   - 3/28/24 he reports worsening of neuropathy, now difficult for him to walk at times. Feels like pins and needles or numbness.   - Has outpatient neurology appt later this week.     Rectal bleeding  - Due to tumor +/- radiation.  Also with some component of hemorrhoid.  Unless bleeding escalates or hgb drops, would hold off on re-examination until radiation inflammation has time to recede  - hgb in clinic on 4/17/24 13.3 gm/dL    Multiple lines of systemic treatments available for treatment.  However his functional status would determine candidacyy for further treatments.  Agree with rehab.  Has scheduled follow  up with Dr Dreyer on 4/25/2024.  We will reschedule this appointment if he goes to the TCU.      Cheikh Bowen MD  [facility]  [phone] (office)         Interval History:     Reports some improvement after his admission.  No fevers.  Able to swallow okay per his report but oral intake has been poor.               Review of Systems:     The 5 point Review of Systems is negative other than noted in the HPI             Medications:   Scheduled Medications  Current Facility-Administered Medications   Medication Dose Route Frequency Provider Last Rate Last Admin    nicotine (NICODERM CQ) 14 MG/24HR 24 hr patch 1 patch  1 patch Transdermal Daily Jackeline Philippe APRN CNP   1 patch at 04/20/24 0817    sodium chloride (PF) 0.9% PF flush 3 mL  3 mL Intracatheter Q8H Jackeline Philippe APRN CNP   3 mL at 04/19/24 2048     PRN Medications  Current Facility-Administered Medications   Medication Dose Route Frequency Provider Last Rate Last Admin    acetaminophen (TYLENOL) tablet 650 mg  650 mg Oral Q4H PRN Jackeline Philippe APRN CNP        Or    acetaminophen (TYLENOL) Suppository 650 mg  650 mg Rectal Q4H PRN Jackeline Philippe APRN CNP        benzocaine-menthol (CHLORASEPTIC) 6-10 MG lozenge 1 lozenge  1 lozenge Buccal Q1H PRN Jackeline Philippe APRN CNP        bisacodyl (DULCOLAX) suppository 10 mg  10 mg Rectal Daily PRN Jackeline Philippe APRN CNP        calcium carbonate (TUMS) chewable tablet 1,000 mg  1,000 mg Oral 4x Daily PRN Jackeline Philippe APRN CNP        hydrALAZINE (APRESOLINE) tablet 10 mg  10 mg Oral Q4H PRN Jackeline Philippe APRN CNP        Or    hydrALAZINE (APRESOLINE) injection 10 mg  10 mg Intravenous Q4H PRN Jackelnie Philippe APRN CNP        HYDROmorphone (DILAUDID) injection 0.2 mg  0.2 mg Intravenous Q2H PRN Jackeline Philippe APRN CNP        HYDROmorphone (DILAUDID) injection 0.4 mg  0.4 mg Intravenous Q2H PRN Jackeline Philippe APRN CNP   0.4 mg at 04/19/24 1310    lidocaine (LMX4)  cream   Topical Q1H PRN Jackeline Philippe APRN CNP        lidocaine 1 % 0.1-1 mL  0.1-1 mL Other Q1H PRN Jackeline Philippe APRN CNP        LORazepam (ATIVAN) tablet 1 mg  1 mg Oral At Bedtime PRN Jackeline Philippe APRN CNP        melatonin tablet 1 mg  1 mg Oral At Bedtime PRN Jackeline Philippe APRN CNP        naloxone (NARCAN) injection 0.2 mg  0.2 mg Intravenous Q2 Min PRN [place]ague, [place]        Or    naloxone (NARCAN) injection 0.4 mg  0.4 mg Intravenous Q2 Min PRN Mague, [place]        Or    naloxone (NARCAN) injection 0.2 mg  0.2 mg Intramuscular Q2 Min PRN [place]ague, [place]        Or    naloxone (NARCAN) injection 0.4 mg  0.4 mg Intramuscular Q2 Min PRN [place], [place]        ondansetron (ZOFRAN ODT) ODT tab 4 mg  4 mg Oral Q6H PRN Jackeline Philippe APRN CNP        Or    ondansetron (ZOFRAN) injection 4 mg  4 mg Intravenous Q6H PRN Jackeline Philippe APRN CNP   4 mg at 04/17/24 1813    oxyCODONE (ROXICODONE) tablet 5 mg  5 mg Oral Q4H PRN Jackeline Philippe APRN CNP   5 mg at 04/19/24 1154    oxyCODONE IR (ROXICODONE) half-tab 2.5 mg  2.5 mg Oral Q4H PRN Jackeline Philippe APRN CNP        polyethylene glycol (MIRALAX) Packet 17 g  17 g Oral BID PRN Jcakeline Philippe APRN CNP        prochlorperazine (COMPAZINE) injection 5 mg  5 mg Intravenous Q6H PRN Jackeline Philippe APRN CNP        Or    prochlorperazine (COMPAZINE) tablet 5 mg  5 mg Oral Q6H PRN Jackeline Philippe APRN CNP        Or    prochlorperazine (COMPAZINE) suppository 12.5 mg  12.5 mg Rectal Q12H PRN Jackeline Philippe APRN CNP        senna-docusate (SENOKOT-S/PERICOLACE) 8.6-50 MG per tablet 1 tablet  1 tablet Oral BID PRN Jackeline Philippe APRN CNP        Or    senna-docusate (SENOKOT-S/PERICOLACE) 8.6-50 MG per tablet 2 tablet  2 tablet Oral BID PRN Jackeline Philippe APRN CNP        sodium chloride (PF) 0.9% PF flush 3 mL  3 mL Intracatheter q1 min prn Jackeline Philippe APRN CNP                      Physical Exam:   Vitals were  "reviewed  Blood pressure 128/82, pulse 62, temperature 98.9  F (37.2  C), temperature source Oral, resp. rate 16, height 1.803 m (5' 11\"), weight 50 kg (110 lb 4.4 oz), SpO2 98%.  Wt Readings from Last 4 Encounters:   04/18/24 50 kg (110 lb 4.4 oz)   03/08/24 57.6 kg (127 lb)   02/08/24 63.5 kg (140 lb)   12/24/18 74.8 kg (165 lb)       I/O last 3 completed shifts:  In: 63 [P.O.:60; I.V.:3]  Out: 800 [Urine:800]    Constitutional: Awake, alert, cooperative, no apparent distress            Data:   All laboratory data and imaging studies reviewed.    CMP  Recent Labs   Lab 04/19/24  0826 04/18/24  1054 04/17/24  1037   NA  --  137 140   POTASSIUM 4.1 3.8 3.9   CHLORIDE  --  103 102   CO2  --  27 24   ANIONGAP  --  7 14   GLC  --  120* 114*   BUN  --  14.8 21.5   CR  --  0.76 0.93   GFRESTIMATED  --  >90 >90   MANSOOR  --  8.9 9.4   MAG 1.8 2.1 2.2   PHOS 3.0 2.8 3.0   PROTTOTAL  --   --  6.8   ALBUMIN  --   --  3.9   BILITOTAL  --   --  0.8   ALKPHOS  --   --  87   AST  --   --  15   ALT  --   --  9     CBC  Recent Labs   Lab 04/19/24  1350 04/18/24  1054 04/17/24  1828   WBC 5.5 4.2 3.6*   RBC 3.20* 3.57* 3.55*   HGB 10.4* 11.6* 11.7*   HCT 31.1* 34.7* 35.0*   MCV 97 97 99   MCH 32.5 32.5 33.0   MCHC 33.4 33.4 33.4   RDW 14.4 14.6 14.6   * 122* 113*     INRNo lab results found in last 7 days.  Data   Results for orders placed or performed during the hospital encounter of 04/17/24 (from the past 24 hour(s))   CBC with platelets   Result Value Ref Range    WBC Count 5.5 4.0 - 11.0 10e3/uL    RBC Count 3.20 (L) 4.40 - 5.90 10e6/uL    Hemoglobin 10.4 (L) 13.3 - 17.7 g/dL    Hematocrit 31.1 (L) 40.0 - 53.0 %    MCV 97 78 - 100 fL    MCH 32.5 26.5 - 33.0 pg    MCHC 33.4 31.5 - 36.5 g/dL    RDW 14.4 10.0 - 15.0 %    Platelet Count 113 (L) 150 - 450 10e3/uL                "

## 2024-04-21 PROBLEM — Z51.5 HOSPICE CARE: Status: ACTIVE | Noted: 2024-01-01

## 2024-04-21 NOTE — DISCHARGE SUMMARY
"[facility]  Hospitalist Discharge Summary      Date of Admission:  4/17/2024  Date of Discharge:  4/21/2024  Discharging Provider: Gunjan Agrawal MD  Discharge Service: Hospitalist Service    Discharge Diagnoses   Failure to thrive  Generalized weakness   Metastatic (lungs) adenocarcinoma pMMR, KRAS G12C mutated, of the rectum. 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    Weight as of this encounter: 50 kg (110 lb 4.4 oz).       Follow-ups Needed After Discharge       Unresulted Labs Ordered in the Past 30 Days of this Admission       No orders found from 3/18/2024 to 4/18/2024.        These results will be followed up by None    Discharge Disposition   Discharged to [facility]  Condition at discharge: Terminal    Hospital Course   Ronnie Herrera Jr. is a 64 year old male admitted on 4/17/2024. He has a past medical history significant for stage 4 rectal cancer, small bowel obstruction, ventral hernia, abdominal aneurysm s/p aorto ileo-femoral bypass graft (2016), tobacco abuse and chronic back pain. He was seen by his oncologist earlier today who advised him to come to the hospital for admission.      Failure to thrive  Generalized weakness   Metastatic (lungs) adenocarcinoma pMMR, KRAS G12C mutated, of the rectum. 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 Patrick Dreyer at [facility], last seen 4/17/2024  * S/p chemo and SBRT to a pulmonary nodule (see Oncologic history below)  * 03/2024: recurrent rectal bleeding with exam suggesting progression of rectal tumor  * Now s/p chemoradiation with continued rectal bleeding (completed 4/16/24)  * Reports decreased appetite, weakness, with unintentional 21lb weight loss and increased bleeding with bowel movements since March.      Met with family, wife Malathi, daughter Nadia and William (preferred name). Their primary goal is comfort, not prolongation of life.  Wife feels he is suffering and does not want any prolongation of current process. They would like continued hospice care with SNF.   Allow to eat and drink as tolerates for comfort.   Continue Keppra IV BID for now, unclear if he can take oral.   GIP hospice consult and accepted to care.   Discussed with Dr. Bowen, oncologist, and agrees with above plan. Notes that he has a very rare biologically aggressive rectal cancer. He will meet with family and patient today.   Discussed with CM, patient is a [place] so they will discuss potential benefits with family.      Ongoing rectal bleeding, stable secondary to progression of rectal cancer  Acute normocytic anemia (11.7 on admission, 15.5 on 2/8/2024)  Mild leukopenia and thrombocytopenia suspect secondary to recent chemo  All counts stable today  No evidence of iron deficiency on Iron panel on 4/18/24  No further labs under comfort cares.              Recent Labs   Lab 04/20/24  1852 04/20/24  0959 04/19/24  1350 04/18/24  1054 04/17/24  1828   WBC 4.8  --  5.5 4.2 3.6*   HGB 11.3* 11.5* 10.4* 11.6* 11.7*   *  --  113* 122* 113*         Severe malnutrition in context of above, 21#  weight loss  Noted.      H/o SBO  H/o Ventral hernia, repair 2017  *Reports decreased frequency of bowel movements recently.   *No active signs or symptoms of SBO.      Peripheral neuropathy secondary to chemotherapy.   This has progressed.  It is difficult to walk he feels pins-and-needles or numbness.  Per oncology, he is on duloxetine for this but I do not see this on his PTA med list or recent dispense history, and per patient he never actually started this medication.      Anxiety  Uses Ativan about 3 times a week PRN.   Ativan ordered PRN per comfort [facility].      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 Currently smoking 1 pack per day. Uses nicotine patches whether he has been actively smoking or not.   Continue nicotine replacement     Consultations This Hospital Stay   PHYSICAL THERAPY ADULT IP CONSULT  HEMATOLOGY & ONCOLOGY IP CONSULT  NUTRITION SERVICES ADULT IP CONSULT  CARE MANAGEMENT / SOCIAL WORK IP CONSULT  NUTRITION SERVICES ADULT IP CONSULT  [facility] INPATIENT HOSPICE ADULT CONSULT    Code Status   No CPR- Do NOT Intubate    Time Spent on this Encounter   I, Gunajn Agrawal MD, personally saw the patient today and spent greater than 30 minutes discharging this patient.       Gunjan Agrawal MD  [facility]  [address] [place] [address], [address]  [facility] [address]  Phone: [phone]  ______________________________________________________________________    Physical Exam   Vital Signs:     BP: (!) 152/67 Pulse: 65   Resp: 16 SpO2: 98 % O2 Device: None (Room air)    Weight: 110 lbs 4.39 oz  See note from earlier today.        Primary Care Physician   [facility]    Discharge Orders   No discharge procedures on file.    Significant Results and Procedures   Most Recent 3 CBC's:  Recent Labs   Lab Test 04/20/24  1852 04/20/24  0959 04/19/24  1350 04/18/24  1054   WBC 4.8  --  5.5 4.2   HGB 11.3* 11.5* 10.4* 11.6*    MCV 97  --  97 97   *  --  113* 122*     Most Recent 3 BMP's:  Recent Labs   Lab Test 04/20/24  1852 04/20/24  1811 04/20/24  0959 04/19/24  0826 04/18/24  1054 04/17/24  1037   *  --   --   --  137 140   POTASSIUM 3.9  --  3.7 4.1 3.8 3.9   CHLORIDE 98  --   --   --  103 102   CO2 28  --   --   --  27 24   BUN 8.8  --   --   --  14.8 21.5   CR 0.72  --   --   --  0.76 0.93   ANIONGAP 7  --   --   --  7 14   MANSOOR 8.5*  --   --   --  8.9 9.4   * 122*  --   --  120* 114*     Most Recent 2 LFT's:  Recent Labs   Lab Test 04/17/24  1037   AST 15   ALT 9   ALKPHOS 87   BILITOTAL 0.8   ,   Results for orders placed or performed during the hospital encounter of 04/17/24   CT Head w/o Contrast    Narrative    EXAM: CT HEAD W/O CONTRAST  LOCATION: [facility]  DATE: 4/20/2024    INDICATION: Altered mental status, history of lung cancer.  COMPARISON: None available.  TECHNIQUE: Routine CT Head without IV contrast. Multiplanar reformats. Dose reduction techniques were used.    FINDINGS: There are presumed intra-axial metastases in the left frontal lobe and cerebellar vermis. The largest lesion is in the high posterior left frontal lobe measuring 3.8 x 2.0 x 3.5 cm in the greatest oblique AP, oblique transverse and   cephalocaudad dimensions respectively. The smaller left frontal lesion measures 9 mm diameter. The vermian lesion measures 3.7 x 3.3 x 3.0 cm in the AP, transverse and cephalocaudad dimensions respectively. There is significant vasogenic edema in the   left frontal lobe and midline cerebellum.    Mass effect due to the left frontal lesions results in 5 mm rightward midline shift of the septum pellucidum and early or mild subfalcine herniation of the left frontal lobe towards the right. No evidence for transtentorial herniation. No hydrocephalus.   No evidence for ischemic infarct. No intracranial hemorrhage.      Impression    IMPRESSION:  1. Presumed intra-axial  metastases in the left frontal lobe and cerebellar vermis as detailed above.  2. Moderate vasogenic edema surrounding the lesions in the left frontal lobe and cerebellum.  3. 5 mm rightward midline shift of the septum pellucidum due to the left frontal lesions and edema as well as early or mild subfalcine herniation of the left frontal lobe towards the right.  4. No evidence for intracranial hemorrhage, hydrocephalus or recent infarct.   CTA Head Neck with Contrast    Narrative    EXAM: CTA HEAD NECK W CONTRAST  LOCATION: [facility]  DATE: 4/20/2024    INDICATION: Altered mental status, history of lung cancer.  COMPARISON: None.  CONTRAST: 70 mL [id number].  TECHNIQUE: Head and neck CT angiogram with IV contrast. Axial helical CT images of the head and neck vessels obtained during the arterial phase of intravenous contrast administration. Axial 2D reconstructed images and multiplanar 3D MIP reconstructed   images of the head and neck vessels were performed by the technologist. Dose reduction techniques were used. All stenosis measurements made according to NASCET criteria unless otherwise specified.    FINDINGS:   HEAD CTA:  ANTERIOR CIRCULATION: No stenosis/occlusion, aneurysm, or high flow vascular malformation. Fetal origin of the right posterior cerebral artery from the anterior circulation.    POSTERIOR CIRCULATION: No stenosis/occlusion, aneurysm, or high flow vascular malformation. Dominant right and smaller left vertebral artery contribute to a normal basilar artery.     DURAL VENOUS SINUSES: Expected enhancement of the major dural venous sinuses.    NECK CTA:  CAROTIDS: There is calcified plaque at the origins of the internal carotid arteries on both sides that does not result in any significant vascular narrowing on either side. The bilateral common carotid and bilateral cervical internal carotid arteries are   otherwise patent and unremarkable.    VERTEBRAL ARTERIES: No focal  stenosis or dissection. Dominant right and smaller left vertebral arteries.    AORTIC ARCH: Classic aortic arch anatomy with no significant stenosis at the origin of the great vessels.    NONVASCULAR STRUCTURES: Unremarkable.      Impression    IMPRESSION:   HEAD CTA:   1.  Normal CTA [place].    NECK CTA:  1.  Plaque without stenosis at the internal carotid origins bilaterally.  2.  Otherwise, normal neck CTA.       Discharge Medications   Current Discharge Medication List        CONTINUE these medications which have NOT CHANGED    Details   acetaminophen (TYLENOL) 325 MG tablet Take 325-650 mg by mouth as needed for pain      capecitabine (XELODA) 500 MG tablet Take 3 tablets by mouth BID Monday-Friday during radiation      LORazepam (ATIVAN) 1 MG tablet Take 1 mg by mouth nightly as needed for anxiety      nicotine (NICODERM CQ) 14 MG/24HR 24 hr patch Place 1 patch onto the skin Every 24 hours PRN           Allergies   Allergies   Allergen Reactions    Escitalopram Dizziness

## 2024-04-21 NOTE — PLAN OF CARE
0147 - [id number]    Orientation: A&Ox4, forgetful     Activity: A2 w/GB+W, pt able to turn independently in bed    Diet/BS Checks: Regular    Tele: N/A    IV Access/Drains: R PIV x2 SL    Pain Management: Denies pain, appears comfortable    Abnormal VS/Results: Deferred - comfort [facility]    Bowel/Bladder: Incontinent at times, pivots to BSC    Skin/Wounds: Bruising to BUE, abrasion to coccyx - mepilex in place    Consults: SW, Onc    D/C Disposition: Pending plan    Other Info: Daughter at bedside, family interested in having a care conference

## 2024-04-21 NOTE — PROGRESS NOTES
[facility]  _________________________________________________________________    [facility] 24[facility] Contact Number: [phone]    - Providers: Please contact [facility] with changes in orders or clinical plan of care   - Nursing: Please contact [facility] with significant changes in patient condition    Hospice will notify the care team (including the hospitalist) to confirm date of inpatient hospice (GIP) admission.    New Epic encounter will not be created until hospice completes admission.   ______________________________________________________________________   REFERRAL RECEIVED, THANK YOU FOR THE REFERRAL!     WRITER WILL CONTACT PT/FAMILY TO SCHEDULE INFORMATIONAL HOSPICE VISIT.

## 2024-04-21 NOTE — PROGRESS NOTES
[facility]    Hospitalist Progress Note    Date of Service (when I saw the patient): 04/21/2024  Admit date: 4/17/2024    Interval History   Full details of events over last 24 hours outlined below.   Patient suffered a seizure last night and transitioned to comfort cares.   Denies headache or acute pain. No further seizures. Answering with one syllable yes/no answers, minimally verbal. Moving all extremities. No gross focal neurological deficits.   He had some sips of water overnight but has been pretty obtunded, unclear if he will be able to take pills.     Assessment and Plan  Ronnei Herrera Jr. is a 64 year old male admitted on 4/17/2024. He has a past medical history significant for stage 4 rectal cancer, small bowel obstruction, ventral hernia, abdominal aneurysm s/p aorto ileo-femoral bypass graft (2016), tobacco abuse and chronic back pain. He was seen by his oncologist earlier today who advised him to come to the hospital for admission.      Failure to thrive  Generalized weakness   Metastatic (lungs) adenocarcinoma pMMR, KRAS G12C mutated, of the rectum. 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 Patrick Dreyer at [facility], last seen 4/17/2024  * S/p chemo and SBRT to a pulmonary nodule (see Oncologic history below)  * 03/2024: recurrent rectal bleeding with exam suggesting progression of rectal tumor  * Now s/p chemoradiation with continued rectal bleeding (completed 4/16/24)  * Reports decreased appetite, weakness, with unintentional 21lb weight loss and increased bleeding with bowel movements since March.     Met with family, wife Malathi, daughter Nadia and William (preferred name). Their primary goal is comfort, not prolongation of life.  Wife feels he  is suffering and does not want any prolongation of current process. They would like continued hospice care with SNF.   Allow to eat and drink as tolerates for comfort.   Continue Keppra IV for now, unclear if he can take oral.   [facility]IP hospice consult   If not accepted, please provide input on anti-seizure medication if unable to take pills.   Discussed with Dr. Bowen, oncologist, and agrees with above plan. Notes that he has a very rare biologically aggressive rectal cancer. He will meet with family and patient today.   Discussed with CM, patient is a [place] so they will discuss potential benefits with family.     Ongoing rectal bleeding, stable secondary to progression of rectal cancer  Acute normocytic anemia (11.7 on admission, 15.5 on 2/8/2024)  Mild leukopenia and thrombocytopenia suspect secondary to recent chemo  All counts stable today  No evidence of iron deficiency on Iron panel on 4/18/24  No further labs under comfort cares.     Recent Labs   Lab 04/20/24  1852 04/20/24  0959 04/19/24  1350 04/18/24  1054 04/17/24  1828   WBC 4.8  --  5.5 4.2 3.6*   HGB 11.3* 11.5* 10.4* 11.6* 11.7*   *  --  113* 122* 113*       Severe malnutrition in context of above, 21# weight loss  Noted.      H/o SBO  H/o Ventral hernia, repair 2017  *Reports decreased frequency of bowel movements recently.   *No active signs or symptoms of SBO.     Peripheral neuropathy secondary to chemotherapy.   This has progressed.  It is difficult to walk he feels pins-and-needles or numbness.  Per oncology, he is on duloxetine for this but I do not see this on his PTA med list or recent dispense history, and per patient he never actually started this medication.     Anxiety  Uses Ativan about 3 times a week PRN.   Ativan ordered PRN per comfort cares.      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 Currently smoking 1 pack per day. Uses nicotine patches whether he  "has been actively smoking or not.   Continue nicotine replacement     Assessment & Plan     Clinically Significant Risk Factors Present on Admission                # Thrombocytopenia: Lowest platelets = 108 in last 2 days, will monitor for bleeding        # Cachexia: Estimated body mass index is 15.38 kg/m  as calculated from the following:    Height as of this encounter: 1.803 m (5' 11\").    Weight as of this encounter: 50 kg (110 lb 4.4 oz).       # Financial/Environmental Concerns: none           Diet: Orders Placed This Encounter      Regular Diet Adult     IVF: Started lactated Ringer's at 100 cc/h  DVT Prophylaxis: PCD's and ambulate  Jo Catheter: Not present    No CPR- Do NOT Intubate as discussed on 4/20/24.   Disposition: Consult to [facility]. If not accepted discussed with CM that family would like to discharge to SNF on Hospice. Anticipate days to weeks left. Discussed with family.   PT/OT pending  Communication: Discussed with Oncology, CM, wife, daughter, RN on 04/21/24     Gunjan Agrawal MD    Hospitalist Service  [facility]  Securely message with the Vocera Web Console (learn more here)  Text page via [employer] Paging/Directory    High complexity due to goals of care discussion with change in code status.     -Data reviewed today: I reviewed all new labs and imaging results over the last 24 hours. I personally reviewed no images or EKG's today.    Physical Exam   Temp: 97.7  F (36.5  C) Temp src: Oral BP: (!) 152/67 Pulse: 65   Resp: 16 SpO2: 98 % O2 Device: None (Room air)    Vitals:    04/17/24 1521 04/18/24 1633   Weight: 54.9 kg (121 lb) 50 kg (110 lb 4.4 oz)     Vital Signs with Ranges  Temp:  [97.7  F (36.5  C)] 97.7  F (36.5  C)  Pulse:  [64-65] 65  Resp:  [16-17] 16  BP: (141-174)/(67-86) 152/67  SpO2:  [98 %-99 %] 98 %  I/O last 3 completed shifts:  In: 303 [P.O.:300; I.V.:3]  Out: 330 [Urine:330]    Today's Exam  Constitutional:  NAD, cachectic  Neuropsyche:  " Eyes mostly closed, opens briefly. Smiles appropriately as we talk,. Denies concerns or questions, but minimally verbal. Face appears symmetric very subtle facial droop with smile, moving all extremities.   Respiratory:  Breathing comfortably, good air exchange, no wheezes, no crackles.   Cardiovascular:  Regular rate and rhythm, no edema.  GI:  soft, NT/ND, BS normal.   Skin/Integumen:  No acute rash or sign of bleeding.     Medications   All medications reviewed on 04/21/24       Current Facility-Administered Medications   Medication Dose Route Frequency Provider Last Rate Last Admin     Current Facility-Administered Medications   Medication Dose Route Frequency Provider Last Rate Last Admin    levETIRAcetam (KEPPRA) 750 mg in sodium chloride 0.9 % 100 mL intermittent infusion  750 mg Intravenous Once Roland Chavez  mL/hr at 04/21/24 0833 750 mg at 04/21/24 0833    nicotine (NICODERM CQ) 14 MG/24HR 24 hr patch 1 patch  1 patch Transdermal Daily Jackeline Philippe APRN CNP   1 patch at 04/21/24 0834    sodium chloride (PF) 0.9% PF flush 3 mL  3 mL Intracatheter Q8H Jackeline Philippe APRN CNP   3 mL at 04/20/24 1950     PRN Meds:   Current Facility-Administered Medications   Medication Dose Route Frequency Provider Last Rate Last Admin    acetaminophen (TYLENOL) tablet 650 mg  650 mg Oral Q4H PRN Jackeline Philippe APRN CNP        Or    acetaminophen (TYLENOL) Suppository 650 mg  650 mg Rectal Q4H PRN Jackeline Philippe APRN CNP        atropine 1 % ophthalmic solution 2 drop  2 drop Sublingual Q4H PRN Roland Chavez NP        benzocaine-menthol (CHLORASEPTIC) 6-10 MG lozenge 1 lozenge  1 lozenge Buccal Q1H PRN Jackeline Philippe APRN CNP        [START ON 4/23/2024] bisacodyl (DULCOLAX) suppository 10 mg  10 mg Rectal Once PRN Roland Chavez NP        bisacodyl (DULCOLAX) suppository 10 mg  10 mg Rectal Daily PRN Jackeline Philippe APRN CNP        calcium carbonate (TUMS) chewable tablet 1,000 mg  1,000 mg  Oral 4x Daily PRN Jackeline Philippe APRN CNP        carboxymethylcellulose PF (REFRESH PLUS) 0.5 % ophthalmic solution 1-2 drop  1-2 drop Both Eyes Q1H PRN Roland Chavez NP        glycopyrrolate (ROBINUL) injection 0.2 mg  0.2 mg Intravenous Q4H PRN Roland Chavez NP        hydrALAZINE (APRESOLINE) tablet 10 mg  10 mg Oral Q4H PRN Jackeline Philippe APRN CNP        Or    hydrALAZINE (APRESOLINE) injection 10 mg  10 mg Intravenous Q4H PRN Jackeline Philippe APRN CNP        lidocaine (LMX4) cream   Topical Q1H PRN Jackeline Philippe APRN CNP        lidocaine 1 % 0.1-1 mL  0.1-1 mL Other Q1H PRN Jackeline Philippe APRN CNP        LORazepam (ATIVAN) injection 1 mg  1 mg Intravenous Q3H PRN Roland Chavez NP        Or    LORazepam (ATIVAN) tablet 1 mg  1 mg Sublingual Q3H PRN Roland Chavez NP        LORazepam (ATIVAN) tablet 1 mg  1 mg Oral At Bedtime PRN Jackeline Philippe APRN CNP        melatonin tablet 1 mg  1 mg Oral At Bedtime PRN Jackeline Philippe APRN CNP        mineral oil-hydrophilic petrolatum (AQUAPHOR)   Topical Q1H PRN Roland Chavez NP        morphine (PF) injection 1 mg  1 mg Intravenous Q15 Min PRRoland Cano NP        morphine (PF) injection 2 mg  2 mg Intravenous Q15 Min PRRoland Cano NP        morphine sulfate (ROXANOL) 20 mg/mL (HIGH CONC) soln 5 mg  5 mg Sublingual Q1H PRRoland Cano NP        Or    morphine sulfate (ROXANOL) 20 mg/mL (HIGH CONC) soln 10 mg  10 mg Sublingual Q1H PRRoland Cano NP        naloxone (NARCAN) injection 0.1 mg  0.1 mg Intravenous Q2 Min PRRoland Cano NP        naloxone (NARCAN) injection 0.2 mg  0.2 mg Intravenous Q2 Min PRRoland Cano NP        ondansetron (ZOFRAN ODT) ODT tab 4 mg  4 mg Oral Q6H PRN DenaeJackeline gonzalez APRN CNP        Or    ondansetron (ZOFRAN) injection 4 mg  4 mg Intravenous Q6H PRN Jackeline Philippe APRN CNP   4 mg at 04/17/24 1813    polyethylene glycol (MIRALAX) Packet 17 g  17 g Oral BID PRN Denae,  SARMAD Viveros CNP        prochlorperazine (COMPAZINE) injection 5 mg  5 mg Intravenous Q6H PRN Jackeline Philippe APRN CNP        Or    prochlorperazine (COMPAZINE) tablet 5 mg  5 mg Oral Q6H PRN Jackeline Philippe APRN CNP        Or    prochlorperazine (COMPAZINE) suppository 12.5 mg  12.5 mg Rectal Q12H PRN Jackeline Philippe APRN CNP        senna-docusate (SENOKOT-S/PERICOLACE) 8.6-50 MG per tablet 1 tablet  1 tablet Oral BID PRN Jackeline Philippe APRN CNP        Or    senna-docusate (SENOKOT-S/PERICOLACE) 8.6-50 MG per tablet 2 tablet  2 tablet Oral BID PRN Jackeline Philippe APRN CNP        sennosides (SENOKOT) tablet 1 tablet  1 tablet Oral BID PRN Roland Chavez, NP        sodium chloride (PF) 0.9% PF flush 3 mL  3 mL Intracatheter q1 min prn Jackeline Philippe APRN CNP   3 mL at 04/21/24 0834       Data   Recent Labs   Lab 04/20/24  1852 04/20/24  1811 04/20/24  0959 04/19/24  1350 04/19/24  0826 04/18/24  1054 04/17/24  1828 04/17/24  1037   WBC 4.8  --   --  5.5  --  4.2   < >  --    HGB 11.3*  --  11.5* 10.4*  --  11.6*   < >  --    MCV 97  --   --  97  --  97   < >  --    *  --   --  113*  --  122*   < >  --    INR 0.98  --   --   --   --   --   --   --    *  --   --   --   --  137  --  140   POTASSIUM 3.9  --  3.7  --  4.1 3.8  --  3.9   CHLORIDE 98  --   --   --   --  103  --  102   CO2 28  --   --   --   --  27  --  24   BUN 8.8  --   --   --   --  14.8  --  21.5   CR 0.72  --   --   --   --  0.76  --  0.93   ANIONGAP 7  --   --   --   --  7  --  14   MANSOOR 8.5*  --   --   --   --  8.9  --  9.4   * 122*  --   --   --  120*  --  114*   ALBUMIN  --   --   --   --   --   --   --  3.9   PROTTOTAL  --   --   --   --   --   --   --  6.8   BILITOTAL  --   --   --   --   --   --   --  0.8   ALKPHOS  --   --   --   --   --   --   --  87   ALT  --   --   --   --   --   --   --  9   AST  --   --   --   --   --   --   --  15    < > = values in this interval not displayed.       Recent Results  (from the past 24 hour(s))   CT Head w/o Contrast    Narrative    EXAM: CT HEAD W/O CONTRAST  LOCATION: [facility]  DATE: 4/20/2024    INDICATION: Altered mental status, history of lung cancer.  COMPARISON: None available.  TECHNIQUE: Routine CT Head without IV contrast. Multiplanar reformats. Dose reduction techniques were used.    FINDINGS: There are presumed intra-axial metastases in the left frontal lobe and cerebellar vermis. The largest lesion is in the high posterior left frontal lobe measuring 3.8 x 2.0 x 3.5 cm in the greatest oblique AP, oblique transverse and   cephalocaudad dimensions respectively. The smaller left frontal lesion measures 9 mm diameter. The vermian lesion measures 3.7 x 3.3 x 3.0 cm in the AP, transverse and cephalocaudad dimensions respectively. There is significant vasogenic edema in the   left frontal lobe and midline cerebellum.    Mass effect due to the left frontal lesions results in 5 mm rightward midline shift of the septum pellucidum and early or mild subfalcine herniation of the left frontal lobe towards the right. No evidence for transtentorial herniation. No hydrocephalus.   No evidence for ischemic infarct. No intracranial hemorrhage.      Impression    IMPRESSION:  1. Presumed intra-axial metastases in the left frontal lobe and cerebellar vermis as detailed above.  2. Moderate vasogenic edema surrounding the lesions in the left frontal lobe and cerebellum.  3. 5 mm rightward midline shift of the septum pellucidum due to the left frontal lesions and edema as well as early or mild subfalcine herniation of the left frontal lobe towards the right.  4. No evidence for intracranial hemorrhage, hydrocephalus or recent infarct.   CTA Head Neck with Contrast    Narrative    EXAM: CTA HEAD NECK W CONTRAST  LOCATION: [facility]  DATE: 4/20/2024    INDICATION: Altered mental status, history of lung cancer.  COMPARISON:  None.  CONTRAST: 70 mL [id number].  TECHNIQUE: Head and neck CT angiogram with IV contrast. Axial helical CT images of the head and neck vessels obtained during the arterial phase of intravenous contrast administration. Axial 2D reconstructed images and multiplanar 3D MIP reconstructed   images of the head and neck vessels were performed by the technologist. Dose reduction techniques were used. All stenosis measurements made according to NASCET criteria unless otherwise specified.    FINDINGS:   HEAD CTA:  ANTERIOR CIRCULATION: No stenosis/occlusion, aneurysm, or high flow vascular malformation. Fetal origin of the right posterior cerebral artery from the anterior circulation.    POSTERIOR CIRCULATION: No stenosis/occlusion, aneurysm, or high flow vascular malformation. Dominant right and smaller left vertebral artery contribute to a normal basilar artery.     DURAL VENOUS SINUSES: Expected enhancement of the major dural venous sinuses.    NECK CTA:  CAROTIDS: There is calcified plaque at the origins of the internal carotid arteries on both sides that does not result in any significant vascular narrowing on either side. The bilateral common carotid and bilateral cervical internal carotid arteries are   otherwise patent and unremarkable.    VERTEBRAL ARTERIES: No focal stenosis or dissection. Dominant right and smaller left vertebral arteries.    AORTIC ARCH: Classic aortic arch anatomy with no significant stenosis at the origin of the great vessels.    NONVASCULAR STRUCTURES: Unremarkable.      Impression    IMPRESSION:   HEAD CTA:   1.  Normal CTA [place].    NECK CTA:  1.  Plaque without stenosis at the internal carotid origins bilaterally.  2.  Otherwise, normal neck CTA.

## 2024-04-21 NOTE — PROGRESS NOTES
[facility] Progress Note     Primary Oncologist/Hematologist: Dr. Dreyer          Assessment and Plan:     1.  Metastatic rectal adenocarcinoma; KRAS G12C mutated; MMR proficient disease; status post recent chemoradiation treatments to the progressive primary tumor completed on 4/16/2024  2.  Multiple brain metastasis with significant vasogenic edema with mass effect and rightward midline shift of the septum pellucidum and early or mild subfalcine herniation of the left frontal lobe towards the right  3.  Lung metastasis  4.  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  Peripheral neuropathy  6.  Adult failure to thrive  7.  Generalized weakness  8.  Normocytic anemia  9.  Thrombocytopenia  10.  ECOG of 3    Recommendations:  I met with William, his wife (Yennifer) and daughter (Nadia).  We reviewed the evaluation he has had so far and expressed concern for continued disease progression, with most recently evidence of brain metastasis that we noted on further evaluation of his new onset seizures.  He has had prior chemotherapy chemoradiation treatments.    William has had quite a rapid and steep decline in his functional status.  He appears severely deconditioned and has been eating poorly.  Bedbound for the most part.  Thankfully he shared with me that he is not in pain.  Denied being in discomfort at this juncture.  Denied headaches, nausea, vomiting at this time.    We reviewed the findings of his most recent CT head that showed multiple brain metastasis with evidence of vasogenic edema, midline shift and possible subfalcine herniation.    We reviewed the nature of his extensive disease with continued evidence of disease progression despite his treatments.  William, Yennifer and daughter shared with me their preference to focus on keeping William comfortable.  I voiced agreement to this plan.    I reviewed with the family that prognosis is very poor in this setting with expected survival of  "a few days perhaps.  Family voiced understanding of this.    William has siblings and other children in town.  Family has been updated about the developments.  I shared with Yennifer that William may decline quite rapidly and may not be able to participate in meaningful conversations very soon.  Yennifer was appreciative of this information and is looking to have other family members meet with William soon.    Agree with transition to comfort cares/hospice.  Inpatient hospice referral has been placed.     Cheikh Bowen MD                 Interval History:     Denied headaches, nausea or vomiting.  Denied being in pain or discomfort at this time.  Appears quite weak.  Answering questions appropriately although does not talk much.               Review of Systems:   As per subjective, otherwise 5 systems reviewed and negative.           Physical Exam:   Blood pressure (!) 152/67, pulse 65, temperature 97.7  F (36.5  C), temperature source Oral, resp. rate 16, height 1.803 m (5' 11\"), weight 50 kg (110 lb 4.4 oz), SpO2 98%.      Vital Sign Ranges  Temperature Temp  Av.7  F (36.5  C)  Min: 97.7  F (36.5  C)  Max: 97.7  F (36.5  C)   Blood pressure Systolic (24hrs), Av , Min:141 , Max:174        Diastolic (24hrs), Av, Min:67, Max:86      Pulse Pulse  Av.5  Min: 64  Max: 65   Respirations Resp  Av.5  Min: 16  Max: 17   Pulse oximetry SpO2  Av.5 %  Min: 98 %  Max: 99 %         Intake/Output Summary (Last 24 hours) at 2024 1016  Last data filed at 2024 0825  Gross per 24 hour   Intake 183 ml   Output 380 ml   Net -197 ml       Constitutional:   Looks weak.    Skin:   No rashes, petechiae, or ecchymoses.   HEENT:   No conjunctival pallor or scleral icterus   Neck:   Supple.   Lungs:   Clear to auscultation bilaterally.   Cardiovascular:   Regular rhythm   Abdomen:   Not examined    Extremities:   No leg edema.   Neurological:   Moving all 4 extremities             Medications:     No current " outpatient medications on file.                Data:     Results for orders placed or performed during the hospital encounter of 04/17/24 (from the past 24 hour(s))   Glucose by meter   Result Value Ref Range    GLUCOSE BY METER POCT 122 (H) 70 - 99 mg/dL   CT Head w/o Contrast    Narrative    EXAM: CT HEAD W/O CONTRAST  LOCATION: [facility]  DATE: 4/20/2024    INDICATION: Altered mental status, history of lung cancer.  COMPARISON: None available.  TECHNIQUE: Routine CT Head without IV contrast. Multiplanar reformats. Dose reduction techniques were used.    FINDINGS: There are presumed intra-axial metastases in the left frontal lobe and cerebellar vermis. The largest lesion is in the high posterior left frontal lobe measuring 3.8 x 2.0 x 3.5 cm in the greatest oblique AP, oblique transverse and   cephalocaudad dimensions respectively. The smaller left frontal lesion measures 9 mm diameter. The vermian lesion measures 3.7 x 3.3 x 3.0 cm in the AP, transverse and cephalocaudad dimensions respectively. There is significant vasogenic edema in the   left frontal lobe and midline cerebellum.    Mass effect due to the left frontal lesions results in 5 mm rightward midline shift of the septum pellucidum and early or mild subfalcine herniation of the left frontal lobe towards the right. No evidence for transtentorial herniation. No hydrocephalus.   No evidence for ischemic infarct. No intracranial hemorrhage.      Impression    IMPRESSION:  1. Presumed intra-axial metastases in the left frontal lobe and cerebellar vermis as detailed above.  2. Moderate vasogenic edema surrounding the lesions in the left frontal lobe and cerebellum.  3. 5 mm rightward midline shift of the septum pellucidum due to the left frontal lesions and edema as well as early or mild subfalcine herniation of the left frontal lobe towards the right.  4. No evidence for intracranial hemorrhage, hydrocephalus or recent infarct.    CTA Head Neck with Contrast    Narrative    EXAM: CTA HEAD NECK W CONTRAST  LOCATION: [facility]  DATE: 4/20/2024    INDICATION: Altered mental status, history of lung cancer.  COMPARISON: None.  CONTRAST: 70 mL [id number].  TECHNIQUE: Head and neck CT angiogram with IV contrast. Axial helical CT images of the head and neck vessels obtained during the arterial phase of intravenous contrast administration. Axial 2D reconstructed images and multiplanar 3D MIP reconstructed   images of the head and neck vessels were performed by the technologist. Dose reduction techniques were used. All stenosis measurements made according to NASCET criteria unless otherwise specified.    FINDINGS:   HEAD CTA:  ANTERIOR CIRCULATION: No stenosis/occlusion, aneurysm, or high flow vascular malformation. Fetal origin of the right posterior cerebral artery from the anterior circulation.    POSTERIOR CIRCULATION: No stenosis/occlusion, aneurysm, or high flow vascular malformation. Dominant right and smaller left vertebral artery contribute to a normal basilar artery.     DURAL VENOUS SINUSES: Expected enhancement of the major dural venous sinuses.    NECK CTA:  CAROTIDS: There is calcified plaque at the origins of the internal carotid arteries on both sides that does not result in any significant vascular narrowing on either side. The bilateral common carotid and bilateral cervical internal carotid arteries are   otherwise patent and unremarkable.    VERTEBRAL ARTERIES: No focal stenosis or dissection. Dominant right and smaller left vertebral arteries.    AORTIC ARCH: Classic aortic arch anatomy with no significant stenosis at the origin of the great vessels.    NONVASCULAR STRUCTURES: Unremarkable.      Impression    IMPRESSION:   HEAD CTA:   1.  Normal CTA [place].    NECK CTA:  1.  Plaque without stenosis at the internal carotid origins bilaterally.  2.  Otherwise, normal neck CTA.   EKG 12-lead,  tracing only   Result Value Ref Range    Systolic Blood Pressure  mmHg    Diastolic Blood Pressure  mmHg    Ventricular Rate 67 BPM    Atrial Rate 67 BPM    WY Interval 88 ms    QRS Duration 70 ms     ms    QTc 390 ms    P Axis -19 degrees    R AXIS 44 degrees    T Axis 33 degrees    Interpretation ECG       Sinus rhythm with short WY  T wave abnormality, consider lateral ischemia  Abnormal ECG  No previous ECGs available     CBC with platelets   Result Value Ref Range    WBC Count 4.8 4.0 - 11.0 10e3/uL    RBC Count 3.39 (L) 4.40 - 5.90 10e6/uL    Hemoglobin 11.3 (L) 13.3 - 17.7 g/dL    Hematocrit 32.8 (L) 40.0 - 53.0 %    MCV 97 78 - 100 fL    MCH 33.3 (H) 26.5 - 33.0 pg    MCHC 34.5 31.5 - 36.5 g/dL    RDW 14.6 10.0 - 15.0 %    Platelet Count 108 (L) 150 - 450 10e3/uL   Partial thromboplastin time   Result Value Ref Range    aPTT 31 22 - 38 Seconds   INR   Result Value Ref Range    INR 0.98 0.85 - 1.15   Basic metabolic panel   Result Value Ref Range    Sodium 133 (L) 135 - 145 mmol/L    Potassium 3.9 3.4 - 5.3 mmol/L    Chloride 98 98 - 107 mmol/L    Carbon Dioxide (CO2) 28 22 - 29 mmol/L    Anion Gap 7 7 - 15 mmol/L    Urea Nitrogen 8.8 8.0 - 23.0 mg/dL    Creatinine 0.72 0.67 - 1.17 mg/dL    GFR Estimate >90 >60 mL/min/1.73m2    Calcium 8.5 (L) 8.8 - 10.2 mg/dL    Glucose 116 (H) 70 - 99 mg/dL   Troponin T, High Sensitivity   Result Value Ref Range    Troponin T, High Sensitivity 9 <=22 ng/L

## 2024-04-21 NOTE — PLAN OF CARE
Comfort cares continued and transitioned to [facility] hospice this evening. Patient is alert and oriented x4. Verbalized sadness regarding cancer progression and prognosis. Many family members present at bedside throughout the day. Increased secretions/coughing noted this afternoon. PRN atropine given x1. Patient c/o pain to rectum. PRN roxanol given x2, provided good pain control. Continues on IV keppra for seizure prevention. Minimal appetite/intake. Uses urinal with assistance. No BM this shift. With minimal intake, patient likely will pass away while in the hospital in the coming days.

## 2024-04-22 NOTE — CONSULTS
[facility]    Consult Note - [facility]  _________________________________________________________________    [facility] 24/7 Contact Number: [phone]    - Providers: Please contact [facility] with changes in orders or clinical plan of care   - Nursing: Please contact [facility] with significant changes in patient condition    Hospice will notify the care team (including the hospitalist) to confirm date of inpatient hospice (GIP) admission.    New Epic encounter will not be created until hospice completes admission.   ______________________________________________________________________        Hospice Diagnosis: Malignant neoplasm of rectum    Indication for Inpatient Hospice: Pain, Anxiety/Agitation, Respiratory Secretions, Seizures    Goals for Hospital Discharge: Management of pain as evidenced by numeric pain score of 3 or less and RR < 20/min in a 24-hr period for 48 hrs; Management of anxiety/agitation as evidenced by no restlessness/grabbing at the air/fidgeting and no report of anxiety in a 24-hr period for 48 hrs; Management of respiratory secretions as evidenced by no wet cough or gurgling in a 24-hr period for 48 hrs; Management of seizures as evidenced by no seizure activity in a 24-hr period for 48 hrs.    Plan of Care Discussed with the Following:   - Nurse: Evelyne CORTEZ RN  - Charge Nurse: N/A  - Hospitalist/Rounding Provider: Gunjan Agrawal MD    - Ronnie's Family/Preferred Contact: Malathi (wife)  - Hospice Provider: Nikko Salmon DO    Summary of Visit (includes assessment, medications and any new orders):     Following a thorough discussion of the hospice philosophy and benefits, the patient provided consent to admission to [facility] hospice. The patient's wife, Malathi, and four sisters were present during the discussion and were in agreement with patient's decision.     On exam, patient's voice is soft and hard to understand at times. Patient  reports pain in his rectum, 5 out of 10 on 1-10 scale. He described the pain as a dull ache. Patient's family states that he does not often admit to pain because he does not like pain medication. The patient also exhibited non-verbal indicators of pain including facial grimacing, clenching teeth, and clinching blanket. Patient agreed to small dose of pain medication; bedside RN administered 5 mg morphine.     Patient is cachectic in appearance with temporal wasting noted. Bowel sounds are hypoactive. Abdomen soft and non-distended. Patient reports not eating today as he is not hungry and drinking a little water. Patient's wife reports that patient had difficulty drinking from a straw today and that over the past few days he has only had a few small bites of solid food.     RR 24 and shallow, HR 72 and weak. Lung sounds absent in upper lobes as well as left lower lobe. Skin is pale and dry with poor turgor. No edema noted.     No medication changes recommended at this time. Coordinated transfer to [facility] with Dr. Gunjan Agrawal. [facility] hospice will assess and evaluate patient daily.       Pau Renae, BRITNEY, CHPN

## 2024-04-22 NOTE — PROGRESS NOTES
[facility]    Social Work Progress Note - [facility]    ______________________________________________________________________    [facility]  Contact Number: [phone]    - Providers: Please contact [facility] with changes in orders or clinical plan of care   - Nursing: Please contact [facility] with significant changes in patient condition  - Social Work: Please contact [facility] for discharge phanning/updates  ______________________________________________________________________        Summary of Visit (includes psychosocial assessment/updates, acceptance of palliative care/hospice philosophy, discharge plans):   CYNDY met with pt and family with RAJAT Mai. SW spoke to family about bereavement support services to follow. Pt's daughter reported she did not want bereavement services. Pt's family reported providing each other with great support and cares as they are very close. Pt's family did not have questions regarding medications or hospice service.     Interventions and response to Interventions (short-term counseling, family conference, education/resources offered to the family):  CYNDY provided spouse, Malathi with [facility] officer contact - Dwayne Lu at [phone] to inquire about  benefits. CYNDY provided Malathi with [employer]respot of [employer] contact to inquire about power of  forms. CYNDY provided Malathi with contact for notary services once POA forms are complete. CYNDY encouraged family's self care for their mental and emotional health. SW encouraged story telling and reminisce to uplift spirits.      CYNDY left message for [facility] officer with request for return call regarding pt's benefit coverage for LTC should pt meet hospice goals.     Plan of Care Discussed with the Following:   - Nurse: BRITNEY Tay  - Hospitalist/Rounding Provider: Gunjan Agrawal MD    - Ronnie's Family/Preferred Contact: Malathi spouse  - Hospice Provider:   Viky Wu, LGSW

## 2024-04-22 NOTE — PLAN OF CARE
Orientation: A/Ox4-forgetful  Activity: Ax2 GB/W  Diet/BS Checks: Reg diet-very poor appetite  Tele:  N/A  IV Access/Drains: 2x RPIV-SL  Pain Management: Roxanol given x2 for rectal pain  Abnormal VS/Results: VS deferred-Comfort Cares  Bowel/Bladder: Incontient at times. Uses urinal at bedside. Pivots to BSC  Skin/Wounds: scatered scabs and bruising. Wound to Coccyx-Mepi in place.  Consults: GIP/Hospice  D/C Disposition: Pt likely to pass in hospital.  Other Info: T/R Q 2 hr. Family at bedside.

## 2024-04-22 NOTE — PLAN OF CARE
[id number] - [id number]    Orientation: A&Ox4, forgetful     Activity: A2 w/GB+W, T/R q 2 hrs    Diet/BS Checks: Regular, poor appetite    Tele: N/A    IV Access/Drains: R PIV x2 SL    Pain Management: Denies pain, appears comfortable    Abnormal VS/Results: Deferred - comfort cares    Bowel/Bladder: Incontinent at times, pivots to BSC    Skin/Wounds: Bruising to BUE, wound to coccyx - mepilex changed    Consults: SW, Onc, GIP    D/C Disposition: Pending    Other Info: GIP hospice

## 2024-04-22 NOTE — CONSULTS
[facility]    Consult Note - [facility]patient [facility]  _________________________________________________________________    [facility] 24/7 Contact Number: [phone]    - Providers: Please contact [facility] with changes in orders or clinical plan of care   - Nursing: Please contact [facility] with significant changes in patient condition    Hospice will notify the care team (including the hospitalist) to confirm date of inpatient hospice (GIP) admission.    New Epic encounter will not be created until hospice completes admission.   ______________________________________________________________________        Hospice Diagnosis: Malignant neoplasm of rectum    Indication for Inpatient Hospice: Pain, Anxiety/Agitation, Respiratory Secretions, and Seizures    Goals for Hospital Discharge:  Management of pain as evidenced by numeric pain score of 3 or less and RR < 20/min in a 24-hr period for 48 hrs; Management of anxiety/agitation as evidenced by no restlessness/grabbing at the air/fidgeting and no report of anxiety in a 24-hr period for 48 hrs; Management of respiratory secretions as evidenced by no wet cough or gurgling in a 24-hr period for 48 hrs; Management of seizures as evidenced by no seizure activity in a 24-hr period for 48 hrs.     Plan of Care Discussed with the Following:   - Nurse: BRITNEY Tay  - Charge Nurse: N/A  - Hospitalist/Rounding Provider: Kerry Cummings MD    - Ronnie's Family/Preferred Contact: WifeYennifer  - Hospice Provider: Nikko Salmon DO    Summary of Visit (includes assessment, medications and any new orders):     Evaluated patient with several family members at bedside (wife, sisters, daughters, and grandson). Pt lethargic and cachectic in appearance with temporal wasting. Skin is pale and cool to the touch. Pedal pulses faint, bilaterally. No edema noted.     /73, HR 62 (weak), RR 14 (labored with use of accessory muscles), temp 97.9,  "and O2 sats 98% (RA). Patient denies anxiety, but reports pain 4/10, described as \"dull\" and located in rectum. Lung sounds clear in right upper lobe and diminished in all other lobes. Bowel sounds active in left upper quadrant and hypoactive in all other quadrants. Patient continues to have a wet cough with thick secretions and gurgling.     Minimal intake - 2 small bites of sherbert and a few sips of water, but patient has started coughing with food and fluids. Patient became incontinent of urine overnight and is now wearing incontinence briefs.    Discussed pain management with patient who expressed not wanting something all the time right now. He asked to continue having it as needed. Patient became teary with this discussion and reported feeling \"scared.\"     Hospice Chaplain met with patient earlier today which he appreciated and requested additional visits.     No medication changes recommended at this time.     A  pining ceremony is being organized by hospice team for Thursday, 4/25/24.    Pau Renae RN, CHPN    "

## 2024-04-22 NOTE — PROGRESS NOTES
"[facility]    Hospitalist Progress Note    Assessment & Plan   Ronnie Herrera Jr (preferred name William). is a 64 year old male who is being transitioned to inpatient hospice on 4/21/2024.      Failure to thrive  Generalized weakness   Metastatic (lungs) adenocarcinoma pMMR, KRAS G12C mutated, of the rectum. (March 2022)   Recent rectal bleeding with progression of rectal cancer   Seizure secondary to brain mets to L frontal lobe and cerebellar vermis with vasogenic edema leading to mild midline shift, subfalcine herniation of L frontal lobe     Patient was admitted to [facility] hospice-inpatient on 4/21  Allow to eat and drink as tolerates for comfort.   Continue Keppra 750 mg IV BID for now, unclear if he can take oral.   Discussed with CM, patient is a [place] so they will discuss potential benefits with family  Comfort medications ordered including Roxanol 5 to 10 mg PO every hour as needed, IV morphine available as needed, Ativan 1 mg IV or tablet for anxiety or seizure.  [facility] hospice following, patient is currently comfortable    Diet :Regular Diet Adult  DVT Prophylaxis: Comfort measures status, end-of-life  Code Status: No CPR- Do NOT Intubate     40 MINUTES SPENT BY ME on the date of service doing chart review, history, exam, documentation & further activities per the note.  Medically Ready for Discharge: Anticipated in 2-4 Days    Clinically Significant Risk Factors Present on Admission                # Thrombocytopenia: Lowest platelets = 108 in last 2 days, will monitor for bleeding        # Cachexia: Estimated body mass index is 15.38 kg/m  as calculated from the following:    Height as of 4/19/24: 1.803 m (5' 11\").    Weight as of 4/18/24: 50 kg (110 lb 4.4 oz).       # Financial/Environmental Concerns:           Kerry Cummings MD  Text Page   (7am to 6pm)    Interval History   Patient is resting, spouse near bedside, pain well-controlled.    -Data reviewed today: I reviewed all new " labs and imaging results over the last 24 hours.   Physical Exam     Vital Signs with Ranges     I/O last 3 completed shifts:  In: 60 [P.O.:60]  Out: 300 [Urine:300]    Constitutional: Alert, awake  Respiratory: Clear to auscultation bilaterally  Cardiovascular: tachycardiac  GI: soft   Skin/Integumen:  no edema  Neuro : moving all 4 extremities    Medications   Current Facility-Administered Medications   Medication Dose Route Frequency Provider Last Rate Last Admin     Current Facility-Administered Medications   Medication Dose Route Frequency Provider Last Rate Last Admin    levETIRAcetam (KEPPRA) 750 mg in sodium chloride 0.9 % 100 mL intermittent infusion  750 mg Intravenous Q12H Gunjan Agrawal  mL/hr at 04/22/24 0908 750 mg at 04/22/24 0908    nicotine (NICODERM CQ) 14 MG/24HR 24 hr patch 1 patch  1 patch Transdermal Daily Gunjan Agrawal MD   1 patch at 04/22/24 0852    sodium chloride (PF) 0.9% PF flush 3 mL  3 mL Intracatheter Q8H Gunjan Agrawal MD   3 mL at 04/22/24 0855       Data   Recent Labs   Lab 04/20/24  1852 04/20/24  1811 04/20/24  0959 04/19/24  1350 04/19/24  0826 04/18/24  1054 04/17/24  1828 04/17/24  1037   WBC 4.8  --   --  5.5  --  4.2   < >  --    HGB 11.3*  --  11.5* 10.4*  --  11.6*   < >  --    MCV 97  --   --  97  --  97   < >  --    *  --   --  113*  --  122*   < >  --    INR 0.98  --   --   --   --   --   --   --    *  --   --   --   --  137  --  140   POTASSIUM 3.9  --  3.7  --  4.1 3.8  --  3.9   CHLORIDE 98  --   --   --   --  103  --  102   CO2 28  --   --   --   --  27  --  24   BUN 8.8  --   --   --   --  14.8  --  21.5   CR 0.72  --   --   --   --  0.76  --  0.93   ANIONGAP 7  --   --   --   --  7  --  14   MANSOOR 8.5*  --   --   --   --  8.9  --  9.4   * 122*  --   --   --  120*  --  114*   ALBUMIN  --   --   --   --   --   --   --  3.9   PROTTOTAL  --   --   --   --   --   --   --  6.8   BILITOTAL  --   --   --   --   --   --   --  0.8   ALKPHOS   --   --   --   --   --   --   --  87   ALT  --   --   --   --   --   --   --  9   AST  --   --   --   --   --   --   --  15    < > = values in this interval not displayed.     Recent Labs   Lab 04/20/24  1852 04/20/24  1811 04/18/24  1054 04/17/24  1037   * 122* 120* 114*       Imaging:   No results found for this or any previous visit (from the past 24 hour(s)).

## 2024-04-23 NOTE — PLAN OF CARE
[id number]    Orientation: A&Ox4  Activity: Ax1 gait belt and walker. Pt not out of bed this shift. Assistance with T/R as allowed.   Diet/BS Checks: regular  Tele:  none  IV Access/Drains: 2 R PIV SL  Pain Management: reported moderate rectal pain, managed with PRN roxanol x1   Abnormal VS/Results: deferred d/t comfort cares  Bowel/Bladder: incontinent at times. No BM this shift. Utilized urinal at bedside.   Skin/Wounds: coccyx wound. Scattered bruising.   Consults: palliative, GIP  D/C Disposition: pending    Other Info:   A&Ox4. Continues on comfort cares. Full assessment and vital signs deferred d/t comfort cares.  reported moderate rectal pain, managed with PRN roxanol x1. Ax1 gait belt and walker, not out of bed this shift. Assistance with T/R as allowed.  2 R PIV SL. Regular diet. Discharge pending.

## 2024-04-23 NOTE — PLAN OF CARE
[id number]     Summary: Admitted on 04/17 w/ failure to thrive and generalized weakness     Primary Diagnosis: Stage 4 rectal cancer with metastatic involvement to the lungs,     Orientation: AOX4     Activity: A-1 GB/W     Diet/BS Checks: regular     Tele:  NA     IV Access/Drains: R PIVx2  -SL     Pain Management: Morphine     Abnormal VS/Results: VSS on RA     Bowel/Bladder: Incontinent, using urinal at bedside. Incontinent of bowel      Skin/Wounds: Redness at bottom with small peeling/cracking     Consults: GIP Hospice     D/C Disposition: LTC w/ hospice or may pass in the hospital.   Spouse at bedside.

## 2024-04-23 NOTE — CONSULTS
[facility]     Consult Note - [facility] Inpatient Hospice  _________________________________________________________________     [facility] 24/7 Contact Number: [phone]    - Providers: Please contact [facility] with changes in orders or clinical plan of care   - Nursing: Please contact [facility] with significant changes in patient condition     Hospice will notify the care team (including the hospitalist) to confirm date of inpatient hospice (GIP) admission.    New Epic encounter will not be created until hospice completes admission.   ______________________________________________________________________         Hospice Diagnosis: Malignant neoplasm of rectum     Indication for Inpatient Hospice: Pain, Anxiety/Agitation, Respiratory Secretions, and Seizures     Goals for Hospital Discharge:  Management of pain as evidenced by numeric pain score of 3 or less and RR < 20/min in a 24-hr period for 48 hrs; Management of anxiety/agitation as evidenced by no restlessness/grabbing at the air/fidgeting and no report of anxiety in a 24-hr period for 48 hrs; Management of respiratory secretions as evidenced by no wet cough or gurgling in a 24-hr period for 48 hrs; Management of seizures as evidenced by no seizure activity in a 24-hr period for 48 hrs.      Plan of Care Discussed with the Following:   - Nurse: BRITNEY Tay  - Charge Nurse: N/A  - Hospitalist/Rounding Provider: Kerry Cummings MD         - Ronnie's Family/Preferred Contact: Wife, Yennifer    Met with patient and family. Patient appears comfortable but has been needing morphine Q2hrs Per bedside RN. Discussed with family about scheduling morphine to which they were agreeable. Educated family on comfort medications and indication for use so that family can advocate for William as he is minimally responsive only answering one word. Family was wondering when pinning ceremony will take place on Thursday, social work to follow up.      New orders:   Morphine 2mg Q4hrs IV scheduled

## 2024-04-23 NOTE — PROGRESS NOTES
"[facility]    Hospitalist Progress Note    Assessment & Plan   Ronnie Herrera Jr (preferred name William). is a 64 year old male who is being transitioned to inpatient hospice on 4/21/2024.      Failure to thrive  Generalized weakness   Metastatic (lungs) adenocarcinoma pMMR, KRAS G12C mutated, of the rectum. 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   Continue Keppra 750 mg IV BID for now, he has difficulty swallowing.  Discussed with CM, patient is a [place] so they will discuss potential benefits with family  Comfort medications ordered including Roxanol 5 to 10 mg PO every hour as needed, IV morphine available as needed, Ativan 1 mg IV or tablet for anxiety or seizure.  [facility] hospice following, patient is currently comfortable    Diet :Regular Diet Adult  DVT Prophylaxis: Comfort measures status, end-of-life  Code Status: No CPR- Do NOT Intubate     36 MINUTES SPENT BY ME on the date of service doing chart review, history, exam, documentation & further activities per the note.  Medically Ready for Discharge: Anticipated in 2-4 Days    Clinically Significant Risk Factors                         # Cachexia: Estimated body mass index is 15.38 kg/m  as calculated from the following:    Height as of 4/19/24: 1.803 m (5' 11\").    Weight as of 4/18/24: 50 kg (110 lb 4.4 oz)., PRESENT ON ADMISSION       # Financial/Environmental Concerns:           Kerry Cummings MD  Text Page   (7am to 6pm)    Interval History   Family had concerns about intermittent micturition, discussed about pure wick with nursing.    -Data reviewed today: I reviewed all new labs and imaging results over the last 24 hours. "   Physical Exam     Vital Signs with Ranges  Resp:  [14] 14  I/O last 3 completed shifts:  In: 60 [P.O.:60]  Out: 50 [Urine:50]    Constitutional: Sleepy  Respiratory: Clear to auscultation bilaterally  Cardiovascular: tachycardiac  GI: soft   Skin/Integumen:  no edema  Neuro : moving all 4 extremities    Medications   Current Facility-Administered Medications   Medication Dose Route Frequency Provider Last Rate Last Admin     Current Facility-Administered Medications   Medication Dose Route Frequency Provider Last Rate Last Admin    levETIRAcetam (KEPPRA) 750 mg in sodium chloride 0.9 % 100 mL intermittent infusion  750 mg Intravenous Q12H Gunjan Agrawal  mL/hr at 04/23/24 0838 750 mg at 04/23/24 0838    nicotine (NICODERM CQ) 14 MG/24HR 24 hr patch 1 patch  1 patch Transdermal Daily Gunjan Agrawal MD   1 patch at 04/23/24 0838    sodium chloride (PF) 0.9% PF flush 3 mL  3 mL Intracatheter Q8H Gunjan Agrawal MD   3 mL at 04/23/24 0836       Data   Recent Labs   Lab 04/20/24  1852 04/20/24  1811 04/20/24  0959 04/19/24  1350 04/19/24  0826 04/18/24  1054 04/17/24  1828 04/17/24  1037   WBC 4.8  --   --  5.5  --  4.2   < >  --    HGB 11.3*  --  11.5* 10.4*  --  11.6*   < >  --    MCV 97  --   --  97  --  97   < >  --    *  --   --  113*  --  122*   < >  --    INR 0.98  --   --   --   --   --   --   --    *  --   --   --   --  137  --  140   POTASSIUM 3.9  --  3.7  --  4.1 3.8  --  3.9   CHLORIDE 98  --   --   --   --  103  --  102   CO2 28  --   --   --   --  27  --  24   BUN 8.8  --   --   --   --  14.8  --  21.5   CR 0.72  --   --   --   --  0.76  --  0.93   ANIONGAP 7  --   --   --   --  7  --  14   MANSOOR 8.5*  --   --   --   --  8.9  --  9.4   * 122*  --   --   --  120*  --  114*   ALBUMIN  --   --   --   --   --   --   --  3.9   PROTTOTAL  --   --   --   --   --   --   --  6.8   BILITOTAL  --   --   --   --   --   --   --  0.8   ALKPHOS  --   --   --   --   --   --   --  87   ALT   --   --   --   --   --   --   --  9   AST  --   --   --   --   --   --   --  15    < > = values in this interval not displayed.     Recent Labs   Lab 04/20/24  1852 04/20/24  1811 04/18/24  1054 04/17/24  1037   * 122* 120* 114*       Imaging:   No results found for this or any previous visit (from the past 24 hour(s)).

## 2024-04-24 NOTE — PLAN OF CARE
Goal Outcome Evaluation:       Patient remains on comfort care with GIP. Sleeps most of the time, but easily awaken. Follows command. Speaking with a shallow whispering sound. Denies pain. 10 mg oral morphine sulphate every 2 hours. Ativan once. Turn and reposition as needed. Surrounded by family and friends. Will continue comfort measures.

## 2024-04-24 NOTE — PROGRESS NOTES
"[facility]    Hospitalist Progress Note    Assessment & Plan   Ronnie Herrera Jr (preferred name William). is a 64 year old male who is being transitioned to inpatient hospice on 4/21/2024.      Failure to thrive  Generalized weakness   Metastatic (lungs) adenocarcinoma pMMR, KRAS G12C mutated, of the rectum. 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   Continue Keppra 750 mg IV BID for now, he has difficulty swallowing.  Discussed with CM, patient is a [place] so they will discuss potential benefits with family  Comfort medications ordered including Roxanol 5 to 10 mg PO every hour as needed, IV morphine available as needed, Ativan 1 mg IV or tablet for anxiety or seizure.  [facility] hospice following, patient is currently comfortable  Scheduled opioid since 4/24.  Discussed with nursing.    Diet :Regular Diet Adult  DVT Prophylaxis: Comfort measures status, end-of-life  Code Status: No CPR- Do NOT Intubate     38 MINUTES SPENT BY ME on the date of service doing chart review, history, exam, documentation & further activities per the note.  Medically Ready for Discharge: Anticipated in 2-4 Days    Clinically Significant Risk Factors                         # Cachexia: Estimated body mass index is 15.38 kg/m  as calculated from the following:    Height as of 4/19/24: 1.803 m (5' 11\").    Weight as of 4/18/24: 50 kg (110 lb 4.4 oz)., PRESENT ON ADMISSION       # Financial/Environmental Concerns:           Kerry Cummings MD  Text Page   (7am to 6pm)    Interval History   Visited with the patient and family, patient is requiring frequent narcotic dosage, noted information for hospice follow-up, we will " schedule narcotics going forward with recommendations to hold if patient is lethargic.    -Data reviewed today: I reviewed all new labs and imaging results over the last 24 hours.   Physical Exam     Vital Signs with Ranges  Resp:  [16-20] 20  No intake/output data recorded.    Constitutional: Sleepy  Respiratory: Clear to auscultation bilaterally  Cardiovascular: tachycardiac  GI: soft   Skin/Integumen:  no edema  Neuro : moving all 4 extremities    Medications   Current Facility-Administered Medications   Medication Dose Route Frequency Provider Last Rate Last Admin     Current Facility-Administered Medications   Medication Dose Route Frequency Provider Last Rate Last Admin    levETIRAcetam (KEPPRA) 750 mg in sodium chloride 0.9 % 100 mL intermittent infusion  750 mg Intravenous Q12H Gunjan Agrawal  mL/hr at 04/23/24 2113 750 mg at 04/23/24 2113    morphine sulfate (ROXANOL) 20 mg/mL (HIGH CONC) soln 10 mg  10 mg Sublingual Q2H Kerry Cummings MD        nicotine (NICODERM CQ) 14 MG/24HR 24 hr patch 1 patch  1 patch Transdermal Daily Gunjan Agrawal MD   1 patch at 04/23/24 0838    sodium chloride (PF) 0.9% PF flush 3 mL  3 mL Intracatheter Q8H Gunjan Agrawal MD   3 mL at 04/23/24 1621       Data   Recent Labs   Lab 04/20/24  1852 04/20/24  1811 04/20/24  0959 04/19/24  1350 04/19/24  0826 04/18/24  1054 04/17/24  1828 04/17/24  1037   WBC 4.8  --   --  5.5  --  4.2   < >  --    HGB 11.3*  --  11.5* 10.4*  --  11.6*   < >  --    MCV 97  --   --  97  --  97   < >  --    *  --   --  113*  --  122*   < >  --    INR 0.98  --   --   --   --   --   --   --    *  --   --   --   --  137  --  140   POTASSIUM 3.9  --  3.7  --  4.1 3.8  --  3.9   CHLORIDE 98  --   --   --   --  103  --  102   CO2 28  --   --   --   --  27  --  24   BUN 8.8  --   --   --   --  14.8  --  21.5   CR 0.72  --   --   --   --  0.76  --  0.93   ANIONGAP 7  --   --   --   --  7  --  14   MANSOOR 8.5*  --   --   --   --  8.9  --   9.4   * 122*  --   --   --  120*  --  114*   ALBUMIN  --   --   --   --   --   --   --  3.9   PROTTOTAL  --   --   --   --   --   --   --  6.8   BILITOTAL  --   --   --   --   --   --   --  0.8   ALKPHOS  --   --   --   --   --   --   --  87   ALT  --   --   --   --   --   --   --  9   AST  --   --   --   --   --   --   --  15    < > = values in this interval not displayed.     Recent Labs   Lab 04/20/24  1852 04/20/24  1811 04/18/24  1054 04/17/24  1037   * 122* 120* 114*       Imaging:   No results found for this or any previous visit (from the past 24 hour(s)).

## 2024-04-24 NOTE — PLAN OF CARE
[id number]     Orientation: A&Ox4, lethargic  Activity: Ax2 T/R q2-3 hours   Diet/BS Checks: regular  Tele:  none  IV Access/Drains: 2 R PIV SL  Pain Management: reported rectal pain, managed with PRN roxanol x3.   Abnormal VS/Results: deferred d/t comfort cares  Bowel/Bladder: incontinent B/B. No BM this shift.   Skin/Wounds: coccyx wound. Scattered bruising.   Consults: palliative, GIP  D/C Disposition: pending     Other Info:   A&Ox4, lethargic. Continues on comfort cares. Full assessment and vital signs deferred d/t comfort cares.  reported rectal pain, managed with PRN roxanol x3. Pt restless/anxious, PRN ativan utilized x1. Ax2 T/R q2-3h. Incontinent B/B. No Bm this shift.  2 R PIV SL. Regular diet. Discharge pending. Family at bedside overnight.

## 2024-04-24 NOTE — CONSULTS
[facility]    Consult Note - [facility]  _________________________________________________________________    [facility] 24/7 Contact Number: [phone]    - Providers: Please contact [facility] with changes in orders or clinical plan of care   - Nursing: Please contact [facility] with significant changes in patient condition    Hospice will notify the care team (including the hospitalist) to confirm date of inpatient hospice (GIP) admission.    New Epic encounter will not be created until hospice completes admission.   ______________________________________________________________________        Hospice Diagnosis: Malignant neoplasm of the rectum    Indication for Inpatient Hospice: Pain, Anxiety/Agitation, and Respiratory Secretions,     Goals for Hospital Discharge: Management of pain as evidenced by numeric pain score of 3 or less and RR < 20/min for 24 hrs in a 48-hr period; Management of anxiety/agitation as evidenced by no restlessness/grabbing at the air/fidgeting and no report of anxiety for 24 hrs in a 48-hr period; Management of respiratory secretions as evidenced by no wet cough or gurgling for 24 hrs in a 48-hr period    Plan of Care Discussed with the Following:   - Nurse: BRITNEY Peraza  - Charge Nurse: N/A  - Hospitalist/Rounding Provider: Kerry Cummings MD   - Ronnie's Family/Preferred Contact: Malathi (wife)  - Hospice Provider: Nikko Salmon DO    Summary of Visit (includes assessment, medications and any new orders):     Evaluated patient with family at bedside (wife, sisters, and daughter). Per family, patient was agitated overnight and seems to be in more pain. During the night, he was picking at the sheets, pulled his condom catheter off, and was pulling his gown up around his neck. They felt his agitation could be related to having trouble communicating his needs as his voice has gotten softer and he is have difficulty finding words.     He  has had no solid food today. Small sips of water and oral cares only. Patient states he is not hungry. Patient coughs with even small sips of water. Cough is wet with thick secretions. Patient is cachectic in appearance with temporal wasting noted. No edema. Pale is cool to the touch. Patient urinated x 1 today and urine was dark and concentrated. No BM. Patient's wife reports that patient is no longer able to use his right hand as evidenced by being unable to sign documents and being unable to hold a cup.     /73, HR 76 (weak), RR 22 (labored with use of accessory muscles), temp 98.0, and O2 sats 93% (RA). Lungs clear to auscultation in upper lobes, bilaterally, absent in right lower lobe, and fine crackles in left lower lobe. Bowel sounds absent. Abdomen soft and non-distended. Pupils equal and responsive to light.     Patient is awake intermittently throughout visit. Family reports that he has been sleeping a lot more, nearly all day. Patient exhibits signs of pain during visit including furrowed brow and big sighs.     The following medication changes from the hospice MD were provided to Dr. Kerry Cummings via [employer]:    Morphine 3 mg IV Q2H for pain/dyspnea    Lorazepam 1 mg IV Q4H for agitation    [facility] will continue to evaluate/assess patient daily.     [facility] will be doing a  pinning ceremony tomorrow, 4/25/24, at 12:00 pm.    Patient's wife noted that she got the Power of  forms completed and also found the patient's DD-14 papers. She will provide copies to [facility] tomorrow, 4/25/24.    Pau Renae RN, CHPN

## 2024-04-24 NOTE — PROGRESS NOTES
Chart Check:    I stopped by for a social visit to check in on William and his family.  He is currently resting peacefully with many family members at the bedside.  His sister describes moments of agitation that he experiences. Lorazepam is helpful.    We reviewed William's cancer course, including his recent radiation, his hospitalization last week and his seizure.    His family is hopeful that he does not suffer.    I extended my solace and support to William and his family.    It has been our honor to be a part of his team.     He will continue with inpatient hospice. His family is hopeful he is able to pass away in the hospital.       Fatou BAÑUELOS, [facility]  [phone] (office) or [phone] (cell)

## 2024-04-24 NOTE — PLAN OF CARE
Orientation: A/Ox4-very lethargic. Forgetful  Activity: Ax2 GB/W-not oob this shift  Diet/BS Checks: Reg diet-poor appetite  Tele:  N/A  IV Access/Drains: 2xRPIV-SL  Pain Management: Rectal pain Roxanol given Q 2hr-effective. Atropine given x2. Robinul given x1.  Abnormal VS/Results: VS deferred-Comfort Cares  Bowel/Bladder: Incontinent d/t weakness. External cath in place.  Skin/Wounds: Wound on Coccyx-mepi in place.  Consults: GIP/Hospice  D/C Disposition: Pt likely to pass in hospital  Other Info: T/R and oral cares Q 2 hrs. Family at bedside. GIP/Hopsice group setting up a [place]'s pinning ceremony  on Thursday.

## 2024-04-25 NOTE — PROGRESS NOTES
[facility]    Social Work Progress Note - [facility]    ______________________________________________________________________    [facility]  Contact Number: [phone]    - Providers: Please contact [facility] with changes in orders or clinical plan of care   - Nursing: Please contact [facility] with significant changes in patient condition  - Social Work: Please contact [facility] for discharge phanning/updates  ______________________________________________________________________        Summary of Visit (includes psychosocial assessment/updates, acceptance of palliative care/hospice philosophy, discharge plans):   CYNDY met with pt and family; pt was sleeping when SW arrived. Pt's spouse Malathi confirmed she completed POA forms and it has been notarized. Malathi will call pt's [employer] officer regarding burial/ coverage and arrangements. Malathi reported she and family are doing well and prepared for pt's  pinning ceremony at 12pm. CYNDY checked in with [facility] SW - CYNDY had left message for [employer] officer with request for call back for update on pt's benefits coverage should pt be ready for discharge if have met GIP hospice goals.     Interventions and response to Interventions (short-term counseling, family conference, education/resources offered to the family):  Pt's family appreciative of visit and support. SW encouraged self care for family. CYNDY has not called VA officer back however [facility] CYNDY can assist with discharge planning if and when pt meets goals and will contact VA officer if needed.       Plan of Care Discussed with the Following:   - Nurse: BRITNEY Barker  - Hospitalist/Rounding Provider: Gunjan Agrawal MD    - Ronnie's Family/Preferred Contact: Malathi, spouse  - Hospice Provider: Dr. Viky Acevedo-Connor CYNDY

## 2024-04-25 NOTE — PROGRESS NOTES
"[facility]    Hospitalist Progress Note    Assessment & Plan   Ronnie Herrera Jr (preferred name William). is a 64 year old male who is being transitioned to inpatient hospice on 4/21/2024.      Failure to thrive  Generalized weakness   Metastatic (lungs) adenocarcinoma pMMR, KRAS G12C mutated, of the rectum. (March 2022)   Recent rectal bleeding with progression of rectal cancer   Seizure secondary to brain mets to L frontal lobe and cerebellar vermis with vasogenic edema leading to mild midline shift, subfalcine herniation of L frontal lobe     Patient was admitted to [facility] hospice-inpatient on 4/21  Allow to eat and drink as tolerates for comfort.   Continue Keppra 750 mg IV BID for now, he has difficulty swallowing.  Discussed with CM, patient is a [place] so they will discuss potential benefits with family  Comfort medications ordered including Roxanol 5 to 10 mg PO every hour as needed, IV morphine available as needed, Ativan 1 mg IV or tablet for anxiety or seizure.  [facility] hospice following, patient is currently comfortable  Scheduled opioid since 4/24.  Discussed with nursing.    Diet :Regular Diet Adult  DVT Prophylaxis: Comfort measures status, end-of-life  Code Status: No CPR- Do NOT Intubate     37 MINUTES SPENT BY ME on the date of service doing chart review, history, exam, documentation & further activities per the note.  Medically Ready for Discharge: Anticipated in 2-4 Days    Clinically Significant Risk Factors                         # Cachexia: Estimated body mass index is 15.38 kg/m  as calculated from the following:    Height as of 4/19/24: 1.803 m (5' 11\").    Weight as of 4/18/24: 50 kg (110 lb 4.4 oz)., PRESENT ON ADMISSION       # Financial/Environmental Concerns:           Kerry Cummings MD  Text Page   (7am to 6pm)    Interval History   Patient is quite sleepy today, discussed with family, they have a ceremony today for which will hold off on narcotics for short " while.  -Data reviewed today: I reviewed all new labs and imaging results over the last 24 hours.   Physical Exam     Vital Signs with Ranges  Resp:  [18] 18  I/O last 3 completed shifts:  In: -   Out: 200 [Urine:200]    Constitutional: Sleepy  Respiratory: Clear to auscultation bilaterally  Cardiovascular: tachycardiac  GI: soft   Skin/Integumen:  no edema  Neuro : moving all 4 extremities    Medications   Current Facility-Administered Medications   Medication Dose Route Frequency Provider Last Rate Last Admin     Current Facility-Administered Medications   Medication Dose Route Frequency Provider Last Rate Last Admin    levETIRAcetam (KEPPRA) 750 mg in sodium chloride 0.9 % 100 mL intermittent infusion  750 mg Intravenous Q12H Gunjan Agrawal  mL/hr at 04/25/24 0937 750 mg at 04/25/24 0937    LORazepam (ATIVAN) injection 1 mg  1 mg Intravenous Q4H Kerry Cummings MD   1 mg at 04/25/24 0824    morphine (PF) injection 3 mg  3 mg Intravenous Q2H Kerry Cummings MD   3 mg at 04/25/24 1125    nicotine (NICODERM CQ) 14 MG/24HR 24 hr patch 1 patch  1 patch Transdermal Daily Gunjan Agrawal MD   1 patch at 04/25/24 0834    sodium chloride (PF) 0.9% PF flush 3 mL  3 mL Intracatheter Q8H Gunjan Agrawal MD   3 mL at 04/25/24 0835       Data   Recent Labs   Lab 04/20/24  1852 04/20/24  1811 04/20/24  0959 04/19/24  1350 04/19/24  0826   WBC 4.8  --   --  5.5  --    HGB 11.3*  --  11.5* 10.4*  --    MCV 97  --   --  97  --    *  --   --  113*  --    INR 0.98  --   --   --   --    *  --   --   --   --    POTASSIUM 3.9  --  3.7  --  4.1   CHLORIDE 98  --   --   --   --    CO2 28  --   --   --   --    BUN 8.8  --   --   --   --    CR 0.72  --   --   --   --    ANIONGAP 7  --   --   --   --    MANSOOR 8.5*  --   --   --   --    * 122*  --   --   --      Recent Labs   Lab 04/20/24  1852 04/20/24  1811   * 122*       Imaging:   No results found for this or any previous visit (from the past 24  hour(s)).

## 2024-04-25 NOTE — PLAN OF CARE
[id number] - [id number]    Orientation: A&Ox4, lethargic    Activity: A2 w/GB+W, T/R q 2 hrs    Diet/BS Checks: Regular, little intake    Tele: N/A    IV Access/Drains: R PIV x2 SL    Pain Management: On scheduled morphine and ativan    Abnormal VS/Results: Deferred - comfort cares    Bowel/Bladder: Incontinent, no BM this shift    Skin/Wounds: Wound to coccyx, scattered bruising    Consults: SW    D/C Disposition: Pending    Other Info: Sister at bedside

## 2024-04-25 NOTE — PROGRESS NOTES
Orientation: A&Ox4  Activity: turn and repo, 2A in bed. Declines repos at times, education provided  Diet/BS Checks: regular, only liquid intake today  Tele:  N/A  IV Access/Drains: 2x right piv's SL  Pain Management: pain indicated using nonverbal scale: occasional labored breathing, occasional groan with low speech, grimace, tense. Scheduled iv morphine q2h   Abnormal VS/Results: deferred d/t comfort cares  Bowel/Bladder: incontinent at times, family reports patient using urinal once today. No BM.   Skin/Wounds: sacral wound with mepi in place  Consults: GIP  D/C Disposition: GIP  Other Info: patient reports anxiety throughout shift, scheduled iv ativan q4h. Patient feels scheduled meds are helping keep him comfortable. Full assessment and VS deferred d/t comfort cares

## 2024-04-25 NOTE — PLAN OF CARE
Goal Outcome Evaluation:      Patient continues on comfort care. No sign sign of pain or discomfort. Occasional grimacing with turn and reposition. Continues on 3 mg of IV morphine every two hours and 1 mg of IV lorazepam every four hours. Mostly drowsy, but aware of his surrounding. Urine incontinence times one.  Family and friends at the bed side. Will continue with comfort measures.

## 2024-04-25 NOTE — CONSULTS
[facility]    Consult Note - [facility] Inpatient Hospice  _________________________________________________________________    [facility] 24/7 Contact Number: [phone]    - Providers: Please contact [facility] with changes in orders or clinical plan of care   - Nursing: Please contact [facility] with significant changes in patient condition    Hospice will notify the care team (including the hospitalist) to confirm date of inpatient hospice ([facility]) admission.    New Epic encounter will not be created until hospice completes admission.   ______________________________________________________________________        Hospice Diagnosis: Malignant Neoplasm of Rectum    Indication for Inpatient Hospice: Pain, Anxiety/Agitation, Respiratory Distress, Respiratory Secretions    Goals for Hospital Discharge:    MANAGEMENT OF PAIN AS EVIDENCED BY NO FACIAL GRIMACE/TENSE POSTURE/KNEES PULLED UP FOR 24 HRS IN A 48-HR PERIOD     MANAGEMENT OF ANXIETY/AGITATION AS EVIDENCED BY NO RESTLESSNESS/GRABBING AT THE AIR/FIDGETING AND NO REPORT OF ANXIETY FOR 24 HRS IN A 48-HR PERIOD     MANAGEMENT OF RESPIRATORY DISTRESS AS EVIDENCED BY RR < 20/MIN AND NON-LABORED FOR 24 HRS IN A 48-HR PERIOD     MANAGEMENT OF RESPIRATORY SECRETIONS AS EVIDENCED BY NO WET COUGH OR GURGLING FOR 24 HRS IN A 48-HR PERIOD     Plan of Care Discussed with the Following:   - Nurse: BRITNEY Turcios  - Charge Nurse: N/A  - Hospitalist/Rounding Provider: Kerry Cummings MD    - Ronnie's Family/Preferred Contact: Malathi (wife)  - Hospice Provider: Nikko Salmon DO    Summary of Visit (includes assessment, medications and any new orders):     [place] pinning ceremony performed today by the [facility] hospice. 20+ family members present.     Overnight RN reported lethargy, labored breathing, groaning, grimacing, and tense tone. Patient also reported anxiety overnight.     Per patient's family, patient has been sleeping all day and has  periodic reports of pain requiring repositioning. On exam, HR 87 (weak), RR 24 (labored), and O2 sats 89% (RA). Skin is hot to the touch and pale with poor turgor and worsening temporal wasting. Family reports that he has now started to cough/choke with the mouth swabs as well. Patient is no longer taking sips of fluid.     Due to continued pain despite morphine ordered at 3 mg IV Q2H, an increase to morphine 4 mg IV Q2H was recommended to Dr. Kerry Cummings via [employer].     [facility] hospice will continue to assess/evaluate patient daily.     Pau Renae RN, CHPN     abdomen

## 2024-04-26 NOTE — PLAN OF CARE
Goal Outcome Evaluation:    Orientation: ELIZABETH orientation, pt somnolent  Activity: A2, turn/repo q2hrs  Diet/BS Checks: regular, no oral intake today  Tele:  N/A  IV Access/Drains: 2 PIVs SL  Pain Management: no non-verbal indicators of pain present. Morphine dose increased to 4mg q2hrs scheduled, along with scheduled IV ativan per family request  Abnormal VS/Results: deferred d/t comfort cares  Bowel/Bladder:incontinent of urine, no BM  Skin/Wounds: sacral wound with mepi in place  Consults: GIP  D/C Disposition: GIP  Other Info: Pt appeared comfortable throughout shift, RR 22-24, family at bedside.

## 2024-04-26 NOTE — PLAN OF CARE
[id number] - [id number]    Orientation: ELIZABETH, obtunded, lethargic    Activity: A2 w/lift, T/R q 2 hrs    Diet/BS Checks: Regular, no PO intake    Tele: N/A    IV Access/Drains: R PIV x2 SL    Pain Management: On scheduled morphine and ativan, appears comfortable    Abnormal VS/Results: Deferred - comfort cares    Bowel/Bladder: Incontinent, no BM this shift    Skin/Wounds: Wound to coccyx - mepilex changed, scattered bruising    Consults: SW    D/C Disposition: Pending    Other Info: Family at bedside

## 2024-04-26 NOTE — PLAN OF CARE
Goal Outcome Evaluation:    Orientation: ELIZABETH orientation, pt somnolent  Activity: A2, turn/repo q2hrs  Diet/BS Checks: regular, no oral intake today  Tele:  N/A  IV Access/Drains: 2 PIVs SL  Pain Management: no non-verbal indicators of pain present. Morphine dose increased to 5mg q2hrs scheduled, along with scheduled IV ativan per family request  Abnormal VS/Results: deferred d/t comfort cares  Bowel/Bladder: no urine or BM this shift  Skin/Wounds: sacral wound with mepi in place  Consults: GIP  D/C Disposition: GIP  Other Info: Pt appeared comfortable throughout shift, RR 24, family at bedside.

## 2024-04-26 NOTE — CONSULTS
[facility]    Consult Note - [facility] Inpatient [facility]osp[facility]  _________________________________________________________________    [facility] 24/7 Contact Number: [phone]    - Providers: Please contact [facility] with changes in orders or clinical plan of care   - Nursing: Please contact [facility] with significant changes in patient condition    Hospice will notify the care team (including the hospitalist) to confirm date of inpatient hospice (GIP) admission.    New Epic encounter will not be created until hospice completes admission.   ______________________________________________________________________        Hospice Diagnosis: Malignant Neoplasm of Rectum    Indication for Inpatient Hospice: Pain; Anxiety/Agitation; Respiratory Distress    Goals for Hospital Discharge: Management of pain as evidenced by no facial grimace/tense tone/clinching teeth or fists for 24 hrs in a 48-hr period; Management of anxiety/agitation as evidenced by no restlessness/fidgeting for 24 hrs in a 48-hr period; Management of respiratory distress as evidenced by RR < 20/min and non-labored for 24 hrs in a 48-hr period    Plan of Care Discussed with the Following:   - Nurse: Rylee, RN and BRITNEY Zuniga  - Charge Nurse: N/A  - Hospitalist/Rounding Provider: Kerry Cummings MD   - Ronnie's Family/Preferred Contact: Malathi (wife)  - Hospice Provider: Nikko Salmon DO    Summary of Visit (includes assessment, medications and any new orders):     Evaluated patient with family at bedside (wife, daughter, son-in-law, and grandkids). Patient unresponsive to verbal and tactile stimuli. Open-mouth breathing. No gurgling noted.     Patient's family reports that he had a couple of episodes where he would try to sit up and would grab his abdomen. MD also noted patient restless earlier in shift.     On exam, HR 89 (RRR), RR 26 (labored), /72, temp 98.6, and O2 sats 85% (RA). Skin pale and hot to the touch.  Mottling noted to bottom of feet and hands. Lung sounds clear to auscultation in all lobes. Bowel sounds absent x 4 quadrants. No edema noted. Pedal pulses absent. Radial pulses weak. Eyes glassy in appearance. No urinary output throughout the day.    Dr. Cummings increased morphine to 5 mg IV Q2H earlier today based on restlessness and non-verbal signs of pain. No other medication changes needed at this time.     Spoke with family about assessment findings and what to anticipate in the coming days. Obtained thumb print from patient to make family members keepsake thumb print necklaces.     [facility] hospice will continue to assess/evaluate patient daily.    Pau Renae RN, CHPN

## 2024-04-26 NOTE — PLAN OF CARE
Goal Outcome Evaluation:    Summary: admitted from oncology clinic. FTT, weakness, metastatic lung cancer, rectal cancer, seizures secondary to brain mets  Primary Diagnosis: now comfort cares       ONLY POST BELOW FOR END OF SHIFT NOTE     Orientation: ELIZABETH orientation, obtunded  Activity: A2, turn/repo q2hrs/as needed  Diet/BS Checks: regular, no oral intake today  Tele:  N/A  IV Access/Drains: 2 PIVs SL on right arm  Pain Management: no non-verbal indicators of pain present. Morphine dose increased to 5mg q2hrs scheduled, along with scheduled IV ativan per family request  Abnormal VS/Results: deferred d/t comfort cares  Bowel/Bladder: no urine or BM this shift  Skin/Wounds: sacral wound with mepi in place  Consults: GIP  D/C Disposition: GIP  Other Info: Pt appeared comfortable throughout shift, family at bedside and will remain here overnight (spouse and daughter).

## 2024-04-26 NOTE — PROGRESS NOTES
"[facility]    Hospitalist Progress Note    Assessment & Plan   Ronnie Herrera Jr (preferred name William). is a 64 year old male who is being transitioned to inpatient hospice on 4/21/2024.      Failure to thrive  Generalized weakness   Metastatic (lungs) adenocarcinoma pMMR, KRAS G12C mutated, of the rectum. 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   Continue Keppra 750 mg IV BID for now, he has difficulty swallowing.  Discussed with CM, patient is a [place] so they will discuss potential benefits with family  Comfort medications ordered including Roxanol 5 to 10 mg PO every hour as needed, IV morphine available as needed, Ativan 1 mg IV or tablet for anxiety or seizure.  [facility] hospice following, patient is currently comfortable  Scheduled opioid since 4/24.  Discussed with nursing.  Will increase scheduled morphine dose to 5 mg  iv every 2 hours.    Diet :Regular Diet Adult  DVT Prophylaxis: Comfort measures status, end-of-life  Code Status: No CPR- Do NOT Intubate     38 MINUTES SPENT BY ME on the date of service doing chart review, history, exam, documentation & further activities per the note.  Medically Ready for Discharge: Anticipated in 2-4 Days    Clinically Significant Risk Factors                         # Cachexia: Estimated body mass index is 15.38 kg/m  as calculated from the following:    Height as of 4/19/24: 1.803 m (5' 11\").    Weight as of 4/18/24: 50 kg (110 lb 4.4 oz).        # Financial/Environmental Concerns:           Kerry Cummings MD  Text Page   (7am to 6pm)    Interval History   Patient is sleepy but he is occasionally moving around more, will increase narcotic " dosage.  -Data reviewed today: I reviewed all new labs and imaging results over the last 24 hours.   Physical Exam     Vital Signs with Ranges  Resp:  [22-28] 24  No intake/output data recorded.    Constitutional: Sleepy  Respiratory: Clear to auscultation bilaterally  Cardiovascular: tachycardiac  GI: soft   Skin/Integumen:  no edema  Neuro : moving all 4 extremities    Medications   Current Facility-Administered Medications   Medication Dose Route Frequency Provider Last Rate Last Admin    sodium chloride 0.9 % infusion   Intravenous Continuous Kerry Cummings MD         Current Facility-Administered Medications   Medication Dose Route Frequency Provider Last Rate Last Admin    levETIRAcetam (KEPPRA) 750 mg in sodium chloride 0.9 % 100 mL intermittent infusion  750 mg Intravenous Q12H Gunjan Agrawal  mL/hr at 04/26/24 0805 750 mg at 04/26/24 0805    LORazepam (ATIVAN) injection 1 mg  1 mg Intravenous Q4H Kerry Cummings MD   1 mg at 04/26/24 1017    morphine (PF) injection 4 mg  4 mg Intravenous every 2 hours Kerry Cummings MD   4 mg at 04/26/24 1017    nicotine (NICODERM CQ) 14 MG/24HR 24 hr patch 1 patch  1 patch Transdermal Daily Gunjan Agrawal MD   1 patch at 04/26/24 0802    polyethylene glycol (MIRALAX) Packet 17 g  17 g Oral Daily Kerry Cummings MD        senna-docusate (SENOKOT-S/PERICOLACE) 8.6-50 MG per tablet 1 tablet  1 tablet Oral BID Kerry Cummings MD        Or    senna-docusate (SENOKOT-S/PERICOLACE) 8.6-50 MG per tablet 2 tablet  2 tablet Oral BID Kerry Cummings MD        sodium chloride (PF) 0.9% PF flush 3 mL  3 mL Intracatheter Q8H Gunjan Agrawal MD   3 mL at 04/26/24 0805       Data   Recent Labs   Lab 04/20/24  1852 04/20/24  1811 04/20/24  0959 04/19/24  1350   WBC 4.8  --   --  5.5   HGB 11.3*  --  11.5* 10.4*   MCV 97  --   --  97   *  --   --  113*   INR 0.98  --   --   --    *  --   --   --    POTASSIUM 3.9  --  3.7  --    CHLORIDE 98  --   --   --     CO2 28  --   --   --    BUN 8.8  --   --   --    CR 0.72  --   --   --    ANIONGAP 7  --   --   --    MANSOOR 8.5*  --   --   --    * 122*  --   --      Recent Labs   Lab 04/20/24  1852 04/20/24  1811   * 122*       Imaging:   No results found for this or any previous visit (from the past 24 hour(s)).

## 2024-04-27 NOTE — PROGRESS NOTES
[facility]    Consult Note - [facility]patient [facility]  _________________________________________________________________    [facility] 24/7 Contact Number: [phone]    - Providers: Please contact [facility] with changes in orders or clinical plan of care   - Nursing: Please contact [facility] with significant changes in patient condition    Hospice will notify the care team (including the hospitalist) to confirm date of inpatient hospice (GIP) admission.    New Epic encounter will not be created until hospice completes admission.   ______________________________________________________________________        Hospice Diagnosis: Malignant Neoplasm of Rectum    Indication for Inpatient Hospice: Pain, anxiety/agitation and respiratory distress    Goals for Hospital Discharge: Management of pain as evidenced by no facial grimace/tense tone/clinching teeth or fists for 24 hrs in a 48-hr period; Management of anxiety/agitation as evidenced by no restlessness/fidgeting for 24 hrs in a 48-hr period; Management of respiratory distress as evidenced by RR < 20/min and non-labored for 24 hrs in a 48-hr period       Plan of Care Discussed with the Following:   - Nurse: BRITNEY Stubbs  - Charge Nurse: N/A  - Hospitalist/Rounding Provider: Dr. Cummings   - Ronnie's Family/Preferred Contact: Wife  - Hospice Provider: Dr. Nikko Salmon    Summary of Visit (includes assessment, medications and any new orders):  Patient seen today by hospice RN. Patient's daughter and two sisters were present for the visit today.     Patient is unresponsive and remains unable to communicate in an obtunded state. On exam, patient exhibited open mouth breathing, RR of 15 with labored breathing. Per Patient's Daughter, he had a coughing spell while they were visiting. Patient received 1 PRN robinul per RN. No coughing noted on exam. Lungs clear with auscultation. Skin is starting to be mottled in extremities, and warm  to the touch.     RN reports patient raised arm briefly, received 1 PRN morphine for pain. Patient appears relaxed with no response to touch or stimuli.     Instructed hospital RN to continue to monitor signs of increased secretions and educated on use of PRN Robinul and PRN Atropine if needed. Will monitor progression of secretions and management of pain and anxiety with current list of PRN and scheduled medications and provide adjustment recommendations         Leigh Cui RN

## 2024-04-27 NOTE — PLAN OF CARE
Comfort Cares    Admitted on 4/17/2024. PMH significant for stage 4 rectal cancer. Oncologist suggested he be admitted to hospital this day for FTT, Failure to thrive, generalized weakness,   stage 4 rectal cancer with metastatic involvement to the lungs, diagnosed 2022, unintentional 21lb weight loss and increased bleeding with bowel movements since March.    4/26/24 [id number]    Family at bedside    Turn q2h    Scheduled morphine and ativan    Orientation: ELIZABETH orientation, obtunded    Activity: A2, turn/repo q2hrs/as needed    IV Access/Drains: 2 PIVs SL on right arm    Bowel/Bladder: no urine or BM this shift    Skin/Wounds: sacral wound with mepi in place    Consults: AZALIA    D/C Disposition: AZALIA

## 2024-04-27 NOTE — PLAN OF CARE
Goal Outcome Evaluation:    [id number]    Orientation: Obtunded  Activity: Bedrest maintained  Diet/BS Checks: NPO  Tele:  n/a  IV Access/Drains: PIV SL  Pain Management: Scheduled Dilaudid and ativan given - PRNS given as needed  Abnormal VS/Results: VS and assessments deferred  Bowel/Bladder: Incontinent B/B, small BM  Skin/Wounds: Scattered bruises, sacral wound CDI with new dressing  Consults: GIP  D/C Disposition:   Other Info: Family at bedside. Did have ocassional period of restlessness, PRNS given. Robinul given for cough/secretions

## 2024-04-27 NOTE — PLAN OF CARE
Goal Outcome Evaluation:    7737-1797    Orientation: Obtunded, ELIZABETH  Activity: Ax2 w/ lift, T/R q2h  Diet/BS Checks: regular no oral intake.   Tele:  n/a  IV Access/Drains:1 R PIV SL, removed 1 PIV due to leaking.   Pain Management: appears comfortable received scheduled morphine and ativan.   Abnormal VS/Results: deferred d/t comfort cares  Bowel/Bladder: Incontinent of B/B.   Skin/Wounds: sacrum wound with mepilex in place.   Consults: [facility].   D/C Disposition: pending.

## 2024-04-27 NOTE — PROGRESS NOTES
"[facility]    Hospitalist Progress Note    Assessment & Plan   Ronnie Herrera  (preferred name William). is a 64 year old male who is being transitioned to inpatient hospice on 4/21/2024.      Failure to thrive  Generalized weakness   Metastatic (lungs) adenocarcinoma pMMR, KRAS G12C mutated, of the rectum. 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   Continue Keppra 750 mg IV BID for now, he has difficulty swallowing.  Discussed with CM, patient is a [place] so they will discuss potential benefits with family  Comfort medications ordered including Roxanol 5 to 10 mg PO every hour as needed, IV morphine available as needed, Ativan 1 mg IV or tablet for anxiety or seizure.  [facility] hospice following, patient is currently comfortable  Patients IV morphine switched to scheduled sublingual morphine concentrated 10 mg every 4 hours, Ativan 1 mg p.o. every 6 hours scheduled for better symptom control.  IV morphine and Ativan available for breakthrough symptoms  Care plan discussed with patient's bedside RN and multiple family members they are agreeable with the current regimen and medications for better symptom control    Diet :Regular Diet Adult  DVT Prophylaxis: Comfort measures status, end-of-life  Code Status: No CPR- Do NOT Intubate     48 minutes spent in taking care of him today  Medically Ready for Discharge: Anticipated in 2-4 Days    Clinically Significant Risk Factors                         # Cachexia: Estimated body mass index is 15.38 kg/m  as calculated from the following:    Height as of 4/19/24: 1.803 m (5' 11\").    Weight as of 4/18/24: 50 kg (110 lb 4.4 oz).        # " Financial/Environmental Concerns:           Mary Anne Jeter MD   Page [phone](7AM-6PM)      Interval History   Patient is new to me today.  Chart reviewed.  Care resumed  Patient is on comfort cares only under [facility] hospice currently  Resting comfortably in bed.  Medication adjustment made for better symptom control.  No other acute issues      -Data reviewed today: I reviewed all new labs and imaging results over the last 24 hours.   Physical Exam     Vital Signs with Ranges     No intake/output data recorded.    Constitutional: Sleepy  Respiratory: Clear to auscultation bilaterally  Cardiovascular: tachycardiac  GI: soft   Skin/Integumen:  no edema  Neuro : moving all 4 extremities    Medications   Current Facility-Administered Medications   Medication Dose Route Frequency Provider Last Rate Last Admin     Current Facility-Administered Medications   Medication Dose Route Frequency Provider Last Rate Last Admin    levETIRAcetam (KEPPRA) 750 mg in sodium chloride 0.9 % 100 mL intermittent infusion  750 mg Intravenous Q12H Gunjan Agrawal  mL/hr at 04/27/24 0917 750 mg at 04/27/24 0917    LORazepam (ATIVAN) 2 MG/ML (HIGH CONC) oral solution 1 mg  1 mg Oral Q6H Mary Anne Jeter MD   1 mg at 04/27/24 0918    morphine sulfate (ROXANOL) 20 mg/mL (HIGH CONC) soln 10 mg  10 mg Oral or Sublingual Q4H Mary Anne Jeter MD        nicotine (NICODERM CQ) 14 MG/24HR 24 hr patch 1 patch  1 patch Transdermal Daily Gunjan Agrawal MD   1 patch at 04/27/24 0925    polyethylene glycol (MIRALAX) Packet 17 g  17 g Oral Daily Kerry Cummings MD        senna-docusate (SENOKOT-S/PERICOLACE) 8.6-50 MG per tablet 1 tablet  1 tablet Oral BID Kerry Cummings MD        Or    senna-docusate (SENOKOT-S/PERICOLACE) 8.6-50 MG per tablet 2 tablet  2 tablet Oral BID Kerry Cummings MD        sodium chloride (PF) 0.9% PF flush 3 mL  3 mL Intracatheter Q8H Gunjan Agrawal MD   3 mL at 04/27/24 0055       Data   Recent Labs   Lab 04/20/24  3874  04/20/24  1811   WBC 4.8  --    HGB 11.3*  --    MCV 97  --    *  --    INR 0.98  --    *  --    POTASSIUM 3.9  --    CHLORIDE 98  --    CO2 28  --    BUN 8.8  --    CR 0.72  --    ANIONGAP 7  --    MANSOOR 8.5*  --    * 122*     Recent Labs   Lab 04/20/24  1852 04/20/24  1811   * 122*       Imaging:   No results found for this or any previous visit (from the past 24 hour(s)).

## 2024-04-28 NOTE — PLAN OF CARE
[id number]  Orientation: Obtunded, family at bedside.   Activity: Ax2 w/ lift, T/R q2h  Diet/BS Checks: regular no oral intake.   Tele:  n/a  IV Access/Drains: R PIV SL  Pain Management: appears comfortable received scheduled morphine and ativan. PRNs given as needed.   Abnormal VS/Results: deferred d/t comfort cares  Bowel/Bladder: Incontinent of B/B.   Skin/Wounds: sacrum wound with mepilex in place.   Consults: [facility].   D/C Disposition: pending.

## 2024-04-28 NOTE — PROGRESS NOTES
[facility]    Consult Note - [facility]patient [facility]  _________________________________________________________________    [facility] 24/7 Contact Number: [phone]    - Providers: Please contact [facility] with changes in orders or clinical plan of care   - Nursing: Please contact [facility] with significant changes in patient condition    Hospice will notify the care team (including the hospitalist) to confirm date of inpatient hospice (GIP) admission.    New Epic encounter will not be created until hospice completes admission.   ______________________________________________________________________      Hospice Diagnosis: Malignant Neoplasm of Rectum     Indication for Inpatient Hospice: Pain, anxiety/agitation and respiratory distress     Goals for Hospital Discharge: Management of pain as evidenced by no facial grimace/tense tone/clinching teeth or fists for 24 hrs in a 48-hr period; Management of anxiety/agitation as evidenced by no restlessness/fidgeting for 24 hrs in a 48-hr period; Management of respiratory distress as evidenced by RR < 20/min and non-labored for 24 hrs in a 48-hr period        Plan of Care Discussed with the Following:   - Nurse: BRITNEY Stubbs  - Charge Nurse: N/A  - Hospitalist/Rounding Provider: Dr. Jeter  - Ronnie's Family/Preferred Contact: Wife  - Hospice Provider: Dr. Nikko Salmon     Summary of Visit (includes assessment, medications and any new orders):  Patient seen today by hospice RN. Many family members present during visit this afternoon and talked about the difficult night William had. Per family and patient's RNEvelyne, patient has been more agitated and attempted to get out of bed suddenly last night.  William did respond to and attempt to open his eyes during visit, appeared restless and moving extremities briefly when touched.  His HR as 102, RR was 25 and he was very cool to the touch.     With patient's decline, he has become more  agitated and family is concerned that he is uncomfortable. Per family and hospice physician, we recommend increasing patient's scheduled morphine and lorazepam doses to help with terminal agitation and discomfort. Recommend increasing to scheduled morphine (Roxanol) to 15mg q4hrs PO oral solution, scheduled Lorazepam to 1mg q4hrs PO and increasing PRN dose or Robinul to 0.4mg q4hrs PRN as secretions worsen.     Hospice will continue to visit patient daily and will monitor progression of secretions and management of pain and terminal agitation and provide adjustment recommendations.  Please reach out with any questions or concerns.   [facility] 24/7 Contact Number: [phone]         Leigh Cui, MSN, RN

## 2024-04-28 NOTE — PLAN OF CARE
Goal Outcome Evaluation:    [id number]    Orientation: [facility]  Activity: T/R Q2H, bedrest maintained  Diet/BS Checks: NPO, oral swabs completed  Tele:  n/a  IV Access/Drains: PIV SL  Pain Management: Scheduled morphine/ativan given  Abnormal VS/Results: VS and assessments deferred, comfort cares continued  Bowel/Bladder: Incontinent B/B  Skin/Wounds: Scattered bruises, sacral wound  Consults: GIP  D/C Disposition:   Other Info: Pt more restless today, occasionally trying to get out of bed. Scheduled ativan and morphine increased. Also started on scheduled zyprexa. PRN robinul and atropine given. Family at bedside  throughout day. GIP following       Less restless this evening

## 2024-04-28 NOTE — PLAN OF CARE
Goal Outcome Evaluation:    [id number]    Orientation: Obtunded, family at bedside.   Activity: Ax2 w/ lift, T/R q2h  Diet/BS Checks: regular no oral intake.   Tele:  n/a  IV Access/Drains: R PIV SL  Pain Management: appears comfortable received scheduled morphine and ativan. PRNs given as needed.   Abnormal VS/Results: deferred d/t comfort cares  Bowel/Bladder: Incontinent of B/B.   Skin/Wounds: sacrum wound with mepilex in place.   Consults: [facility].   D/C Disposition: pending.

## 2024-04-28 NOTE — PROGRESS NOTES
"[facility]    Hospitalist Progress Note    Assessment & Plan   Ronnie Herrera  (preferred name William). is a 64 year old male who is being transitioned to inpatient hospice on 4/21/2024.      Failure to thrive  Generalized weakness   Metastatic (lungs) adenocarcinoma pMMR, KRAS G12C mutated, of the rectum. 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   Continue Keppra 750 mg IV BID for now, he has difficulty swallowing.  Discussed with CM, patient is a [place] so they will discuss potential benefits with family  Comfort medications ordered including Roxanol 5 to 10 mg PO every hour as needed, IV morphine available as needed, Ativan 1 mg IV or tablet for anxiety or seizure.  [facility] hospice following, patient is currently comfortable  Patients IV morphine switched to scheduled sublingual morphine concentrated 10 mg every 4 hours, Ativan 1 mg p.o. every 6 hours scheduled for better symptom control.  IV morphine and Ativan available for breakthrough symptoms  Care plan discussed with patient's bedside RN and multiple family members they are agreeable with the current regimen and medications for better symptom control    Diet :Regular Diet Adult  DVT Prophylaxis: Comfort measures status, end-of-life  Code Status: No CPR- Do NOT Intubate     48 minutes spent in taking care of him today  Medically Ready for Discharge: Anticipated in 2-4 Days    Clinically Significant Risk Factors                         # Cachexia: Estimated body mass index is 15.38 kg/m  as calculated from the following:    Height as of 4/19/24: 1.803 m (5' 11\").    Weight as of 4/18/24: 50 kg (110 lb 4.4 oz).        # " "Financial/Environmental Concerns:           Mary Anne Jeter MD   Page [phone](7AM-6PM)      Interval History     Patient is on comfort cares only under [facility] hospice currently  Resting comfortably in bed.  Medication adjustment made for better symptom control.  No other acute issues      -Data reviewed today: I reviewed all new labs and imaging results over the last 24 hours.   Physical Exam     Vital Signs with Ranges     No intake/output data recorded.    Constitutional: Sleepy  Respiratory: Clear to auscultation bilaterally  Cardiovascular: tachycardiac  GI: soft   Skin/Integumen:  no edema  Neuro : moving all 4 extremities    Medications   Current Facility-Administered Medications   Medication Dose Route Frequency Provider Last Rate Last Admin     Current Facility-Administered Medications   Medication Dose Route Frequency Provider Last Rate Last Admin    levETIRAcetam (KEPPRA) 750 mg in sodium chloride 0.9 % 100 mL intermittent infusion  750 mg Intravenous Q12H Gunjan Agrawal  mL/hr at 04/28/24 0901 750 mg at 04/28/24 0901    LORazepam (ATIVAN) injection 1 mg  1 mg Intravenous Q4H Mary Anne Jeter MD        morphine sulfate (ROXANOL) 20 mg/mL (HIGH CONC) soln 15 mg  15 mg Oral or Sublingual every 2 hours Mary Anne Jeter MD        nicotine (NICODERM CQ) 14 MG/24HR 24 hr patch 1 patch  1 patch Transdermal Daily Gunjan Agrawal MD   1 patch at 04/28/24 0821    polyethylene glycol (MIRALAX) Packet 17 g  17 g Oral Daily Kerry Cummings MD        senna-docusate (SENOKOT-S/PERICOLACE) 8.6-50 MG per tablet 1 tablet  1 tablet Oral BID Kerry Cummings MD        Or    senna-docusate (SENOKOT-S/PERICOLACE) 8.6-50 MG per tablet 2 tablet  2 tablet Oral BID Kerry Cummings MD        sodium chloride (PF) 0.9% PF flush 3 mL  3 mL Intracatheter Q8H Gunjan Agrawal MD   3 mL at 04/28/24 0813       Data   No lab results found in last 7 days.    No results for input(s): \"GLC\", \"BGM\" in the last 168 hours.      Imaging: "   No results found for this or any previous visit (from the past 24 hour(s)).

## 2024-04-29 NOTE — PROGRESS NOTES
"[facility]    Medicine Progress Note - Hospitalist Service    Date of Admission:  4/21/2024    Assessment & Plan   Ronnie Herrera Jr (preferred name William) is a 64 year old male with metastatic adenocarcinoma who was transitioned to inpatient hospice on 4/21/2024.      Failure to thrive  Generalized weakness   Metastatic (lungs) adenocarcinoma pMMR, KRAS G12C mutated, of the rectum. 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   Continue Keppra 750 mg IV BID for now, he has difficulty swallowing.  Discussed with CM, patient is a [place] so they will discuss potential benefits with family  Comfort medications ordered including Roxanol 5 to 10 mg PO every hour as needed, IV morphine available as needed, Ativan 1 mg IV or tablet for anxiety or seizure.  [facility] hospice following, patient is currently comfortable  4/29 converted to IV morphine and lorazepam, unable to swallow and mostly sleeping lately. Scheduled and prn in place.  Care plan discussed with patient's bedside RN and sister and daughter 4/29          Diet: Regular Diet Adult    DVT Prophylaxis: comfort care  Jo Catheter: Not present  Lines: PRESENT      Port a Cath Right Chest wall-Site Assessment: WDL      Cardiac Monitoring: None  Code Status: No CPR- Do NOT Intubate      Clinically Significant Risk Factors                         # Cachexia: Estimated body mass index is 15.38 kg/m  as calculated from the following:    Height as of 4/19/24: 1.803 m (5' 11\").    Weight as of 4/18/24: 50 kg (110 lb 4.4 oz).      # Financial/Environmental Concerns:           Disposition Plan     Medically Ready for Discharge: inpatient hospice             Kermit CARRION" MD [place]  Hospitalist Service  [facility]  Securely message with [employer] (more info)  Text page via [employer] Paging/Directory   ______________________________________________________________________    Interval History   Care assumed today. RN reported difficulty swallowing - changed morphine and lorazepam to IV (he has port). Patient resting comfortably, nonlabored breathing. Sister and daughter present and report no new concerns or appearance of discomfort. Discussed with RN.    Physical Exam   Vital Signs:                    Weight: 0 lbs 0 oz    GEN: nad, resting  HEENT: eyes closed  PULM: nonlabored, no audible crackle or wheeze  ABDO: nondistended    Medical Decision Making       41 MINUTES SPENT BY ME on the date of service doing chart review, history, exam, documentation & further activities per the note.      Data   NNL

## 2024-04-29 NOTE — PLAN OF CARE
[id number]  Pt appears comfortable, overall he is obtunded.  Currently getting scheduled IV ativan and IV morphine.  Using port for IV medications.  ODT zyprexa not given as pt's oral cavity quite dry.  Oral care provided with moist swabs.  Pt incontinent of urine.  Turned and repo PRN.  Family at bedside.

## 2024-04-29 NOTE — PROGRESS NOTES
[facility]    Consult Note - [facility]  _________________________________________________________________    [facility] 24/7 Contact Number: [phone]    - Providers: Please contact [facility] with changes in orders or clinical plan of care   - Nursing: Please contact [facility] with significant changes in patient condition    Hospice will notify the care team (including the hospitalist) to confirm date of inpatient hospice (GIP) admission.    New Epic encounter will not be created until hospice completes admission.   ______________________________________________________________________      Hospice Diagnosis: Malignant Neoplasm of Rectum     Indication for Inpatient Hospice: Pain, anxiety/agitation and respiratory distress     Goals for Hospital Discharge: Management of pain as evidenced by no facial grimace/tense tone/clinching teeth or fists for 24 hrs in a 48-hr period; Management of anxiety/agitation as evidenced by no restlessness/fidgeting for 24 hrs in a 48-hr period; Management of respiratory distress as evidenced by RR < 20/min and non-labored for 24 hrs in a 48-hr period        Plan of Care Discussed with the Following:   - Nurse:   - Hospitalist/Rounding Provider: Dr Kermit Bethea  - Ronnie's Family/Preferred Contact: Wife Malathi  - Hospice Provider: Dr. Nikko Salmon    Summary of Visit (includes assessment, medications and any new orders):       Met with pt and family.  Discussed allowing the pt to have time alone so to pass. Discussed pt stable with current medication orders RR 17, P 78.  Pt comfortable at present.      Will continue to monitor pt daily while GIP Hospice      Tao Figueroa RN

## 2024-04-29 NOTE — PROGRESS NOTES
MD Notification    Notified Person: MD    Notified Person Name: DR. Sanchez    Notification Date/Time: 4/29 @0430    Notification Interaction: [place]     Purpose of Notification: Pt has a Port, can I get orders to access? Thanks!     Orders Received: Port access orders received.     Comments:

## 2024-04-29 NOTE — PROGRESS NOTES
Orientation: Obtunded, family at bedside.   Activity: T/R for comfort  Diet/BS Checks: NPO; oral cares only as no longer able to swallow   Tele:  n/a  IV Access/Drains: L Chest port HL  Pain Management: Scheduled morphine changed to IV; appears effective  Abnormal VS/Results: deferred d/t comfort cares  Bowel/Bladder: No urine or stool produced this shift  Skin/Wounds: Mepi in place to sacrum   Consults: [facility] Hospice following   D/C Disposition: Pt will pass in the hospital  Other Info: Utilizing IV meds only at this time which appear to be working well for patient.

## 2024-04-29 NOTE — PLAN OF CARE
[id number]  Orientation: Obtunded, family at bedside.   Activity: Ax2 w/ lift, T/R q2h  Diet/BS Checks: regular no oral intake.   Tele:  n/a  IV Access/Drains: Port Hep locked. R PIV SL - leaking   Pain Management: appears comfortable received scheduled morphine and ativan. PRNs given as needed.   Abnormal VS/Results: deferred d/t comfort cares  Bowel/Bladder: Incontinent of B/B.   Skin/Wounds: sacrum wound with mepilex in place.   Consults: [facility].   D/C Disposition: pending.  Other Info: Port was accessed this shift to use IV PRNs.

## 2024-04-30 NOTE — PROGRESS NOTES
Orientation: [facility] orientation, pt unresponsive.   Activity: A2/lift, T/R q2h  Diet/BS Checks: NPO; oral cares only  Tele:  N/A  IV Access/Drains: L chest port HL  Pain Management: Scheduled morphine and ativan; also utilized atropine and robinul  Abnormal VS/Results: full assessment and VS deferred d/t comfort cares  Bowel/Bladder: External male cath in place; reddish orange urine  Skin/Wounds: mepilex to sacrum  Consults: [facility]IP hospice following  D/C Disposition: plan to pass in hospital  Other Info: Many family members present throughout the day. Brief period of increased alertness.

## 2024-04-30 NOTE — PLAN OF CARE
Orientation: [facility] orientation, pt unresponsive.   Activity: A2/lift, T/R q2h  Diet/BS Checks: NPO - oral cares provided  Tele:  N/A  IV Access/Drains: R PIV SL. Port HL.  Pain Management: Scheduled morphine and ativan. Scheduled zyprexa not given d/t oral cavity being very dry.  Abnormal VS/Results: full assessment and VS deferred d/t comfort cares. RR 8-12 breaths/min  Bowel/Bladder: incontinent of B/B.  External male cath placed  Skin/Wounds: mepilex to sacrum  Consults: [facility] hospice following  D/C Disposition: plan to pass in hospital  Other Info:   - continuing keppra infusion  - oral cares provided as able

## 2024-04-30 NOTE — PROGRESS NOTES
[facility]    Social Work Progress Note - [facility]    ______________________________________________________________________    [facility] 24/7 Contact Number: [phone]    - Providers: Please contact [facility] with changes in orders or clinical plan of care   - Nursing: Please contact [facility] with significant changes in patient condition  - Social Work: Please contact [facility] for discharge phanning/updates  ______________________________________________________________________        Summary of Visit (includes psychosocial assessment/updates, acceptance of palliative care/hospice philosophy, discharge plans):   SW met with pt and family - daughter, Leatha, sisters - Mita, Cheyanne and Maira - at bedside. SW checked with family about social supports and spending time with pt. SW encouraged spending 1:1 time with pt and reminisce through story telling and memories. Pt's sister reported that they gave pt some space last night and let pt know it's okay to let go. They were anxious about that decision and were surprised of pt's alertness today. Sisters confirmed that all family has visited pt already expect 1 brother who lives [place]. Pt was sleeping during SW visit and appeared comfortable. Maira reported she was able to contact pt's [employer] officer however didn't receive much information about hospice benefits. Maira will contact [facility] to make burial arrangements however needs to fax the forms.   CYNDY checked in with unit CYNDY and informed SW of Maira's possible request for assistance with faxing forms to [facility] Nguyen.     Interventions and response to Interventions (short-term counseling, family conference, education/resources offered to the family):  SW encouraged self care - getting sleep and attending to normal routine as much as possible. SW provided active listening and validation to anxiety of not wanting to leave pt alone. SW encouraged family  to continue to spend time with pt as much as possible. Family appreciative of visit.     Plan of Care Discussed with the Following:   - Nurse: BRITNEY Long  - Hospitalist/Rounding Provider: Gunjan Agrawal MD    - Ronnie's Family/Preferred Contact: Malathi, spouse  - Hospice Provider: Dr. Viky Acevedo-Worth, LGSW
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
## 2024-04-30 NOTE — PROGRESS NOTES
"[facility]    Medicine Progress Note - Hospitalist Service    Date of Admission:  4/21/2024    Assessment & Plan   Ronnie Herrera Jr (preferred name William) is a 64 year old male with metastatic adenocarcinoma who was transitioned to inpatient hospice on 4/21/2024.      Failure to thrive  Generalized weakness   Metastatic (lungs) adenocarcinoma pMMR, KRAS G12C mutated, of the rectum. 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   Continue Keppra 750 mg IV BID for now, he has difficulty swallowing.  Discussed with CM, patient is a [place]ora so they will discuss potential benefits with family  Comfort medications ordered including Roxanol 5 to 10 mg PO every hour as needed, IV morphine available as needed, Ativan 1 mg IV or tablet for anxiety or seizure.  [facility] hospice following, patient is currently comfortable  4/29 converted to IV morphine and lorazepam, unable to swallow and mostly sleeping lately. Scheduled and prn in place. Continue regimen.  Care plan discussed with patient's bedside RN and multiple family members 4/30             Diet: Regular Diet Adult    DVT Prophylaxis: comfort care  Jo Catheter: Not present  Lines: PRESENT      Port a Cath Right Chest wall-Site Assessment: WDL      Cardiac Monitoring: None  Code Status: No CPR- Do NOT Intubate      Clinically Significant Risk Factors                         # Cachexia: Estimated body mass index is 15.38 kg/m  as calculated from the following:    Height as of 4/19/24: 1.803 m (5' 11\").    Weight as of 4/18/24: 50 kg (110 lb 4.4 oz).      # Financial/Environmental Concerns:           Disposition Plan     Medically Ready for Discharge: inpatient " hospice             Kermit Bethea MD  Hospitalist Service  [facility]  Securely message with [employer] (more info)  Text page via [employer] Paging/Directory   ______________________________________________________________________    Interval History   Discussed with RN. Patient seen and evaluated with family at bedside. They were somewhat surprised that he seemed to wake up a bit and attempt to speak - at times does seem to have some discomfort - again encouraged them to relay to nursing staff any time patient appears to not be comfortable. Stopped zyprexa odt.    Physical Exam   Vital Signs:           Resp: (!) 9        Weight: 0 lbs 0 oz    GEN: nad, comfortable, family at bedside  HEENT: eyes mostly closed  PULM: nonlabored, no audible crackles or wheeze  ABDO: nondistended      Medical Decision Making       38 MINUTES SPENT BY ME on the date of service doing chart review, history, exam, documentation & further activities per the note.      Data   No new labs

## 2024-05-01 NOTE — CONSULTS
[facility]    Consult Note - [facility]  _________________________________________________________________    [facility] 24/7 Contact Number: [phone]    - Providers: Please contact [facility] with changes in orders or clinical plan of care   - Nursing: Please contact [facility] with significant changes in patient condition    Hospice will notify the care team (including the hospitalist) to confirm date of inpatient hospice (GIP) admission.    New Epic encounter will not be created until hospice completes admission.   ______________________________________________________________________        Hospice Diagnosis: Malignant neoplasm of rectum    Indication for Inpatient Hospice: Pain; Agitation; Respiratory Distress; Respiratory Secretions    Goals for Hospital Discharge: Management of pain as evidenced by no facial grimace/tense tone/clinching teeth or fists for 24 hrs in a 48-hr period; Management of anxiety/agitation as evidenced by no restlessness/fidgeting for 24 hrs in a 48-hr period; Management of respiratory distress as evidenced by RR < 20/min and non-labored for 24 hrs in a 48-hr period; Management of respiratory secretions as evidenced by no audible gurgling and less than 3 PRN doses of Robinul/Atropine for 24 hrs in a 48-hr period.    Plan of Care Discussed with the Following:   - Nurse: BRITNEY Matthew  - Charge Nurse: N/A  - Hospitalist/Rounding Provider: Kermit Bethea MD    - Ronnie's Family/Preferred Contact: Malathi (wife)  - Hospice Provider: Nikko Salmon DO    Summary of Visit (includes assessment, medications and any new orders):     Evaluated patient with sisters and daughter at the bedside. Per family, patient has been moaning throughout the day and restless. They requested in increase in morphine which was increased to 4 mg IV Q1H.     On exam, /80,  (irregular), RR 30 (labored/shallow), temperature 99.0, and O2 sats 79% (RA). Lungs  clear in upper lobes and absent in lower lobes. Bowel sounds absent in all 4 quadrants. Abdomen soft and non-distended. Pedal pulses absent. Radial pulses weak and thready. Skin hot to the touch. Mottling of hands noted. Petechiae on bilateral upper extremities. No edema. Patient moaned intermittently during exam, facial grimace noted, and rigid tone. Urine in tubing red in color.    The following medication recommendations from hospice physician, Dr. Salmon, [facility] and included the following:    Morphine PCA - 4 mg continuous infusion with 4 mg Q4H nursing bolus  Ativan - increase to 2 mg IV Q4H and Q2H PRN.     [facility] hospice will continue to evaluate/assess patient daily.    Pau Renae RN, CHPN

## 2024-05-01 NOTE — PROVIDER NOTIFICATION
MD Notification    Notified Person: MD    Notified Person Name: Dr. Bethea.     Notification Date/Time:    5/1/24 0927   Notification Interaction:    Vocera    Purpose of Notification:      Hi Dr. Bethea! room 816: pt is on schedule mophine 4mg q2h and Ativan q4h. family at bedside asks if we can increase morphine q1h as pt is moaning/restless between the schedule meds? Next due dose morphine is 10 AM.     Orders Received:     Changed to q1h schedule Morphine    Comments:

## 2024-05-01 NOTE — PROGRESS NOTES
[facility]    Medicine Progress Note - Hospitalist Service    Date of Admission:  4/21/2024    Assessment & Plan   Ronnie Herrera Jr (preferred name William) is a 64 year old male with metastatic adenocarcinoma who was transitioned to inpatient hospice on 4/21/2024.      Failure to thrive  Generalized weakness   Metastatic (lungs) adenocarcinoma pMMR, KRAS G12C mutated, of the rectum. 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   Continue Keppra 750 mg IV BID for now, he has difficulty swallowing.  Discussed with CM, patient is a  so they will discuss potential benefits with family  Comfort medications ordered including Roxanol 5 to 10 mg PO every hour as needed, IV morphine available as needed, Ativan 1 mg IV or tablet for anxiety or seizure.  [facility] hospice following, patient is currently comfortable  Converted to IV morphine and lorazepam, unable to swallow and mostly sleeping lately. Scheduled and prn in place. Continue regimen.  Care plan discussed with patient's bedside RN and multiple family members 4/30 and daughter and sister 5/1 5/1 increased morphine frequency to 4mg IV every hour - family notes some discomfort earlier but improved now     ADDENDUM  15:55 5/1 - [facility]ospice RN messaged to recommend morphine PCA initiation  - ordered as requested:   - morphine 4mg continuous infusion with 4mg q4hprn RN bolus   - ativan to 2mg q4hr scheduled and 2mg [id number]     Diet: Regular Diet Adult    DVT Prophylaxis: comfort  Jo Catheter: Not present  Lines: PRESENT      Port a Cath Right Chest wall-Site Assessment: WDL      Cardiac Monitoring: None  Code Status: No CPR- Do NOT Intubate      Clinically  "Significant Risk Factors                         # Cachexia: Estimated body mass index is 15.38 kg/m  as calculated from the following:    Height as of 4/19/24: 1.803 m (5' 11\").    Weight as of 4/18/24: 50 kg (110 lb 4.4 oz).      # Financial/Environmental Concerns:           Disposition Plan     Medically Ready for Discharge: inpatient hospice             Kermit Bethea MD  Hospitalist Service  [facility]  Securely message with [employer] (more info)  Text page via [employer] Paging/Directory   ______________________________________________________________________    Interval History   Seen and evaluated with sister and dtr at bedside. Discussed with RN - increased morphine freq with some improved comfort.    Physical Exam   Vital Signs:           Resp: 27        Weight: 0 lbs 0 oz    GEN: nad, not awake  HEENT: eyes closed  PULM: RR faster than yesterday but not labored, no audible crackles or wheeze  ABDO: nondistended    Medical Decision Making       41 MINUTES SPENT BY ME on the date of service doing chart review, history, exam, documentation & further activities per the note.      Data   NNL  "

## 2024-05-01 NOTE — PLAN OF CARE
Goal Outcome Evaluation:               5/1/24 2411-3923:    Orientation: ELIZABETH. Unresponsive most of the shift. Briefly open eyes at times. Restless at times.   Activity: A2 lift. Q2h T/R as family allows.   Diet/BS Checks: NPO. Oral cares provided.   Tele:  N/A  IV Access/Drains: L chest Port infusing Morphine PCA continuous 4 ml/hr.  Pain Management: on scheduled q1h Morphine and q4h Ativan. Nonverbal pain assessment.   Abnormal VS/Results: deferred, comfort cares  Bowel/Bladder: Incont. No BM. External cath in place with brown/brick color urine-minimal due to no PO intake.   Skin/Wounds: mepilex to sacrum in place-CDI.   Consults: AZALIA.   D/C Disposition: Plan to pass in hospital.   Other Info: Family at bedside.

## 2024-05-01 NOTE — PROGRESS NOTES
Orientation: ELIZABETH orientation, pt unresponsive.   Activity: A2/lift, T/R q2h PRN  Diet/BS Checks: NPO; oral cares   Tele:  N/A  IV Access/Drains: L chest port HL with int keppra and meds  Pain Management: Scheduled morphine and ativan; also utilized atropine and robinul  Abnormal VS/Results: full assessment and VS deferred d/t comfort cares. Brief periods of apnea noted.   Bowel/Bladder: External male cath in place and changed, reddish orange urine  Skin/Wounds: mepilex to sacrum  Consults: GIP hospice following  D/C Disposition: plan to pass in hospital  Other Info: Many family members present throughout the day.

## 2024-05-01 NOTE — PLAN OF CARE
Orientation: ELIZABETH - pt unresponsive  Activity: A2/lift, T/R q2h/PRN  Diet/BS Checks: NPO, oral cares provided  Tele:  N/A  IV Access/Drains: L port HL   Pain Management: scheduled IV morphine q2h, scheduled ativan q4h. PRN ativan given x1, PRN morphine given x1. Family at bedside requesting more frequent medication.  Abnormal VS/Results: full assessment and VS deferred d/t comfort cares. RR 10-15 with periods of apnea.  Bowel/Bladder: incontinent of B/B, external cath in place  Skin/Wounds: mepilex in place to sacrum  Consults: GIP hospice  D/C Disposition: plan to pass in hospital  Other Info:   - family at bedside overnight  - continuing keppra infusion  - robinul and atropine for secretions

## 2024-05-02 NOTE — PROGRESS NOTES
[facility]    Consult Note - [facility] Inpatient [facility]osp[facility]  _________________________________________________________________    [facility] 24/7 Contact Number: [phone]    - Providers: Please contact [facility] with changes in orders or clinical plan of care   - Nursing: Please contact [facility] with significant changes in patient condition    Hospice will notify the care team (including the hospitalist) to confirm date of inpatient hospice (GIP) admission.    New Epic encounter will not be created until hospice completes admission.   ______________________________________________________________________        Hospice Diagnosis: Malignant neoplasm of rectum     Indication for Inpatient Hospice: Pain; Agitation; Respiratory Distress; Respiratory Secretions     Goals for Hospital Discharge: Management of pain as evidenced by no facial grimace/tense tone/clinching teeth or fists for 24 hrs in a 48-hr period; Management of anxiety/agitation as evidenced by no restlessness/fidgeting for 24 hrs in a 48-hr period; Management of respiratory distress as evidenced by RR < 20/min and non-labored for 24 hrs in a 48-hr period; Management of respiratory secretions as evidenced by no audible gurgling and less than 3 PRN doses of Robinul/Atropine for 24 hrs in a 48-hr period.     Plan of Care Discussed with the Following:   - Nurse: BRITNEY Matthew  - Charge Nurse: N/A  - Hospitalist/Rounding Provider: Kermit Bethea MD           - Ronnie's Family/Preferred Contact: Malathi (wife)  - Hospice Provider: Nikko Salmon DO    Summary of Visit (includes assessment, medications and any new orders):   Saw pt today, his O2 72% on RA and labored. Family at bedside. X3 Morphine, X1 Ativan, x3 Robinul, and X2 Atropine administered in the last 24hrs. Spoke with hospice provider .   Recommendations: increase Morphine drip to 5mg. Vocera chat sent to [place] MD and orders placed.       Roddy BARKLEY  BRITNEY Jane

## 2024-05-02 NOTE — PROGRESS NOTES
[facility]    Medicine Progress Note - Hospitalist Service    Date of Admission:  4/21/2024    Assessment & Plan   Ronnie Herrera Jr (preferred name William) is a 64 year old male with metastatic adenocarcinoma who was transitioned to inpatient hospice on 4/21/2024.      Failure to thrive  Generalized weakness   Metastatic (lungs) adenocarcinoma pMMR, KRAS G12C mutated, of the rectum. 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   Continue Keppra 750 mg IV BID for now, he has difficulty swallowing.  Discussed with CM, patient is a  so they will discuss potential benefits with family  Comfort medications ordered including Roxanol 5 to 10 mg PO every hour as needed, IV morphine available as needed, Ativan 1 mg IV or tablet for anxiety or seizure.  [facility] hospice following, patient is currently comfortable  Converted to IV morphine and lorazepam, unable to swallow and mostly sleeping lately. Scheduled and prn in place. Continue regimen.  Care plan discussed with patient's bedside RN and multiple family members 4/30 and daughter and sister 5/1 5/1 increased morphine frequency to 4mg IV every hour - family notes some discomfort earlier but improved now  5/1 - [facility] Hospice RN messaged to recommend morphine PCA initiation  - ordered as requested:              - morphine 4mg continuous infusion with 4mg q4hprn RN bolus              - ativan to 2mg q4hr scheduled and 2mg [id number]  - ADDENDUM - messaged with hospice late afternoon - seems like RR may have ticked up and breathing more labored, will increase morphine to 5/hr. Please notify myself or cross cover if further increase needed overnight.        Diet:  "Regular Diet Adult    DVT Prophylaxis: comfort/hospice  Jo Catheter: Not present  Lines: PRESENT      Port a Cath Right Chest wall-Site Assessment: WDL      Cardiac Monitoring: None  Code Status: No CPR- Do NOT Intubate      Clinically Significant Risk Factors                         # Cachexia: Estimated body mass index is 15.38 kg/m  as calculated from the following:    Height as of 4/19/24: 1.803 m (5' 11\").    Weight as of 4/18/24: 50 kg (110 lb 4.4 oz).      # Financial/Environmental Concerns:           Disposition Plan     Medically Ready for Discharge: in patient hospice             Kermit Bethea MD  Hospitalist Service  [facility]  Securely message with [email or web address] (more info)  Text page via [employer] Paging/Directory   ______________________________________________________________________    Interval History   Seen and examined with dtr and sister at bedside. Discussed RR which is improved today - acknowledge that it may  be indicative of discomfort. Discussed with RN.     Physical Exam   Vital Signs:           Resp: 29        Weight: 0 lbs 0 oz    GEN: nad, not awake  HEENT: eyes closed  PULM: RR improved, not labored, no audible crackles or wheeze  ABDO: nondistended    Medical Decision Making       37 MINUTES SPENT BY ME on the date of service doing chart review, history, exam, documentation & further activities per the note.      Data         "

## 2024-05-02 NOTE — PLAN OF CARE
Goal Outcome Evaluation:         5/2/24 7559-2500:       Orientation: ELIZABETH. Unresponsive.  Activity: A2 lift. Q2h T/R as family allows.   Diet/BS Checks: NPO. Oral cares provided.   Tele:  N/A  IV Access/Drains: L chest Port infusing Morphine PCA continuous 5 mg/hr.  Pain Management: on scheduled q4h Ativan. Nonverbal pain assessment.   Abnormal VS/Results: deferred, comfort cares  Bowel/Bladder: Incont. No BM. External cath in place with brown/brick color urine-minimal due to no PO intake.   Skin/Wounds: mepilex to sacrum in place-CDI.   Consults: AZALIA.   D/C Disposition: Plan to pass in hospital.   Other Info: Family at bedside. Comfort measures.      Nicotine patch on left shoulder.

## 2024-05-02 NOTE — PLAN OF CARE
Orientation: ELIZABETH, obtunded.   Activity: A2 with lift, T/R.   Diet/BS Checks: Regular.   Tele:  N/a.   IV Access/Drains: Port infusing Morphine continuously @ 4 ml/hr.   Pain Management: Scheduled q4hr ativan & continuous morphine.   Abnormal VS/Results: Deferred - comfort cares.   Bowel/Bladder: Little UOP and no BM this shift.   Skin/Wounds: Scattered bruising, mepi to sacrum CDI.   Consults: AZALIA   D/C Disposition: Plan to pass in hospital.

## 2024-05-03 NOTE — PROGRESS NOTES
DATE & TIME:5/2/24,8863-3469  Cognitive Concerns/ Orientation:Unresponsive, ELIZABETH  ABNL VS/O2:Deferred d/t comfort cares  MOBILITY:Not oob  PAIN MANAGMENT:Morphine PCA continuous 5 mg/hr. , ativan  DIET:NPO  BOWEL/BLADDER:External cath in place, no BM  DRAIN/DEVICES: L chest Port  TELEMETRY RHYTHM:n/a  SKIN: Mepliex to sacrum in place   TESTS/PROCEDURES:none  D/C DATE:  Plan to pass in hospital  OTHER IMPORTANT INFO: Family at bedside.

## 2024-05-03 NOTE — PLAN OF CARE
Comfort cares, all formal assessment and VS deferred. Pt obtunded. RR 16-24. Apneic breathing noted at times.  Port infusing morphine gtt at 5ml/hr w/ NS at 10ml/hr. On scheduled ativan. Continue to keep pt comfortable. Family at bedside.

## 2024-05-03 NOTE — PROGRESS NOTES
"[facility]    Medicine Progress Note - Hospitalist Service    Date of Admission:  4/21/2024    Assessment & Plan   Ronnie Herrera Jr (preferred name William) is a 64 year old male with metastatic adenocarcinoma who was transitioned to inpatient hospice on 4/21/2024.      Failure to thrive  Generalized weakness   Metastatic (lungs) adenocarcinoma pMMR, KRAS G12C mutated, of the rectum. 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   Continue Keppra 750 mg IV BID for now, he has difficulty swallowing.  Discussed with CM, patient is a [place] so they will discuss potential benefits with family  Comfort medications ordered including Roxanol 5 to 10 mg PO every hour as needed, IV morphine available as needed, Ativan 1 mg IV or tablet for anxiety or seizure.  [facility] hospice following, patient is currently comfortable  Converted to IV morphine and lorazepam, unable to swallow and mostly sleeping lately. Scheduled and prn in place. Continue regimen.  Continuing to discuss daily with daughter and sister in room, sometimes additional family members  Continue morphine PCA and IV lorazepam             Diet: Regular Diet Adult    DVT Prophylaxis: comfort care  Jo Catheter: Not present  Lines: PRESENT      Port a Cath Right Chest wall-Site Assessment: WDL      Cardiac Monitoring: None  Code Status: No CPR- Do NOT Intubate      Clinically Significant Risk Factors                         # Cachexia: Estimated body mass index is 15.38 kg/m  as calculated from the following:    Height as of 4/19/24: 1.803 m (5' 11\").    Weight as of 4/18/24: 50 kg (110 lb 4.4 oz).      # Financial/Environmental Concerns:           Disposition " Plan     Medically Ready for Discharge: inpatient hospice             Kermit Bethea MD  Hospitalist Service  [facility]  Securely message with [employer] (more info)  Text page via [employer] Paging/Directory   ______________________________________________________________________    Interval History   Patient seen.-Discussed with daughter, sister in room.  Patient appears comfortable he is not tachypneic.  I do not appreciate any restlessness.  Hospice staff also in room.  Discussed with RN.    Physical Exam   Vital Signs:           Resp: 22        Weight: 0 lbs 0 oz    GEN: nad, unresponsive  HEENT: eyes closed  PULM: RR in upper teens, not labored, no audible crackles or wheeze  ADBO: nondistended    Medical Decision Making       38 MINUTES SPENT BY ME on the date of service doing chart review, history, exam, documentation & further activities per the note.      Data   NNL

## 2024-05-03 NOTE — PLAN OF CARE
Orientation: ELIZABETH, pt not responsive  Activity: a-2 turn and repo  Diet/BS Checks: NPO   Tele:  n/a  IV Access/Drains: L Port infusing PCA pump 5ml/hr, NS running @10 ml/hr  Pain Management: PCA morphine, scheduled ativan  Abnormal VS/Results: deferred d/t comfort cares  Bowel/Bladder: inc, external cath in place, dark red colored output, no BM this shift  Skin/Wounds: scattered bruising, sacral wound  Consults: GIP hospice  D/C Disposition: plan to pass in hospital  Other Info: family at bedside overnight  -nicotine patch L arm

## 2024-05-03 NOTE — PROGRESS NOTES
[facility]     Consult Note - [facility] In[facility]  _________________________________________________________________     [facility] 24/7 Contact Number: [phone]    - Providers: Please contact [facility] with changes in orders or clinical plan of care   - Nursing: Please contact [facility] with significant changes in patient condition     Hospice will notify the care team (including the hospitalist) to confirm date of inpatient hospice (GIP) admission.    New Epic encounter will not be created until hospice completes admission.   ______________________________________________________________________         Hospice Diagnosis: Malignant neoplasm of rectum     Indication for Inpatient Hospice: Pain; Agitation; Respiratory Distress; Respiratory Secretions     Goals for Hospital Discharge: Management of pain as evidenced by no facial grimace/tense tone/clinching teeth or fists for 24 hrs in a 48-hr period; Management of anxiety/agitation as evidenced by no restlessness/fidgeting for 24 hrs in a 48-hr period; Management of respiratory distress as evidenced by RR < 20/min and non-labored for 24 hrs in a 48-hr period; Management of respiratory secretions as evidenced by no audible gurgling and less than 3 PRN doses of Robinul/Atropine for 24 hrs in a 48-hr period.     Plan of Care Discussed with the Following:   - Nurse: BRITNEY Matthew  - Charge Nurse: N/A  - Hospitalist/Rounding Provider: Kermit Bethea MD           - Ronnie's Family/Preferred Contact: Malathi (wife)  - Hospice Provider: Nikko Salmon DO     Summary of Visit (includes assessment, medications and any new orders):   Saw pt today, his O2 was 64% on RA and breathing was labored and slower than his previous 22-30 breaths per minute respiratory rate. Breathing pattern has periods of pausing and apnea with a RR of 19 during the visit. Family at bedside. Increased morphine drip yesterday and it appears to be helping  patient remain comfortable- no PRN medications were given overnight. Talked with bedside RN about the use of PRN Robinul as he progresses and secretions are unmanageable. Explained that with his continued decline in status, he is showing more signs for imminent death.  Advised to continue to monitor and provide comfort with PRNs as ordered.  Recommendations: Give PRN Robinul as needed and continue with current scheduled comfort medications.    Leigh Cui, MSN, RN

## 2024-05-04 NOTE — DEATH PRONOUNCEMENT
MD DEATH PRONOUNCEMENT    Called to pronounce Ronnie Herrera  dead.    Physical Exam: Unresponsive to noxious stimuli, Spontaneous respirations absent, Breath sounds absent, Carotid pulse absent, Heart sounds absent, Pupillary light reflex absent, and Corneal blink reflex absent    Patient was pronounced dead at 0415 AM, May 4, 2024.    Preliminary Cause of Death: acute hypoxic respiratory failure 2/2 metastatic lung disease    Active Problems:    Hospice care       Infectious disease present?: NO    Communicable disease present? (examples: HIV, chicken pox, TB, Ebola, CJD) :  NO    Multi-drug resistant organism present? (example: MRSA): NO    Please consider an autopsy if any of the following exist:  NO Unexpected or unexplained death during or following any dental, medical, or surgical diagnostic treatment procedures.   NO Death of mother at or up to seven days after delivery.     NO All  and pediatric deaths.     NO Death where the cause is sufficiently obscure to delay completion of the death certificate.   NO Deaths in which autopsy would confirm a suspected illness/condition that would affect surviving family members or recipients of transplanted organs.     The following deaths must be reported to the 's Office:  NO A death that may be due entirely or in part to any factors other than natural disease (recent surgery, recent trauma, suspected abuse/neglect).   NO A death that may be an accident, suicide, or homicide.     NO Any sudden, unexpected death in which there is no prior history of significant heart disease or any other condition associated with sudden death.   NO A death under suspicious, unusual, or unexpected circumstances.    NO Any death which is apparently due to natural causes but in which the  does not have a personal physician familiar with the patient s medical history, social, or environmental situation or the circumstances of the terminal event.   NO Any death  apparently due to Sudden Infant Death Syndrome.     NO Deaths that occur during, in association with, or as consequences of a diagnostic, therapeutic, or anesthetic procedure.   NO Any death in which a fracture of a major bone has occurred within the past (6) six months.   NO A death of persons note seen by their physician within 120 days of demise.     NO Any death in which the  was an inmate of a public institution or was in the custody of Law Enforcement personnel.   NO  All unexpected deaths of children   NO Solid organ donors   NO Unidentified bodies   NO Deaths of persons whose bodies are to be cremated or otherwise disposed of so that the bodies will later be unavailable for examination;   NO Deaths unattended by a physician outside of a licensed healthcare facility or licensed residential hospice program   NO Deaths occurring within 24 hours of arrival to a health care facility if death is unexpected.    NO Deaths associated with the decedent s employment.   NO Deaths attributed to acts of terrorism.   NO Any death in which there is uncertainty as to whether it is a medical examiner s care should be discussed with the medical investigator.        Body disposition: Body released to the [facility].    SARMAD Santos, CNP  Hospitalist - [facility]  Text me on the [employer]vin for a textback

## 2024-05-04 NOTE — PROVIDER NOTIFICATION
MD Notification    Notified Person: MD hospitalist    Notified Person Name: Dr. Cast    Notification Date/Time: 5/4/2024 at 0105    Notification Interaction: [employer] web messaging     Purpose of Notification:     [address] pt comfort cares. Pt port worked before medication administration last night but no blood return when checked again, received 1 dose alteplase, waited 30 minutes, no blood return, waited additional 90 min per instructions, still no blood return. Pt was on a morphine drip and scheduled IV ativan. has not had medications since port has had no blood return. no current PIV, place PIV for morphine drip or another port alteplase intervention? Also, family wondering if we can have sublingual medication to keep patient comfortable while port is not being used. any interventions?       Orders Received:  2nd dose of alteplase ordered  Place PIV   One time dose roxanol ordered    Comments:

## 2024-05-04 NOTE — PROVIDER NOTIFICATION
MD Notification    Notified Person: MD hospitalist    Notified Person Name: Dr. Jenkins    Notification Date/Time: 5 /3/2024 at 2156    Notification Interaction: [employer] web messaging     Purpose of Notification: [address].B. Pt is comfort cares. has port infusing morphine drip. intermittent keppra infusion and scheduled IV ativan. Port had good blood return before medication administration. No longer has blood return. Still flushes well, no signs of infiltration. no PIV currently. Do you want us to place PIV or do any port interventions?     Orders Received: alteplase order placed     Comments:

## 2024-05-04 NOTE — PROGRESS NOTES
"Paged on-call  services per family request.     \"[address] Pt has passed. family would like  services. patient is racheal. [phone]\"    On-call  services provided at bedside.   "

## 2024-05-04 NOTE — PROGRESS NOTES
Patient time of death was 0415. Family at bedside. Next of kin notified via phone call. Belongings (clothing, blankets) sent home with family. Family instructed to send dentures with patient to [facility].  Autopsy requested (no). Death record completed. Patient's next of Kin Malathi was contacted regarding patient having meds at pharmacy, Malathi declined wanting to take them.

## 2024-05-04 NOTE — DISCHARGE SUMMARY
[facility]    Death Summary - Hospitalist Service     Date of Admission:  4/21/2024  Date of Death: 5/4/2024   04:15  Discharging Provider: Kermit Bethea MD    Discharge Diagnoses   Hospice  Failure to thrive  Generalized weakness   Metastatic (lungs) adenocarcinoma pMMR, KRAS G12C mutated, of the rectum. 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      Failure to thrive  Generalized weakness   Metastatic (lungs) adenocarcinoma pMMR, KRAS G12C mutated, of the rectum. 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   Continue Keppra 750 mg IV BID for now, he has difficulty swallowing.  Discussed with CM, patient is a [place] so they will discuss potential benefits with family  Comfort medications ordered including Roxanol 5 to 10 mg PO every hour as needed, IV morphine available as needed, Ativan 1 mg IV or tablet for anxiety or seizure.  [facility] hospice following, patient is currently comfortable  Converted to IV morphine and lorazepam, unable to swallow and mostly sleeping lately. Scheduled and prn in place. Continue regimen.  Continuing to discuss daily with daughter and sister in room, sometimes additional family members  Continue morphine PCA and IV  lorazepam      Kermit Bethea MD  [facility]  ______________________________________________________________________      Significant Results and Procedures   Most Recent 3 CBC's:  Recent Labs   Lab Test 04/20/24  1852 04/20/24  0959 04/19/24  1350 04/18/24  1054   WBC 4.8  --  5.5 4.2   HGB 11.3* 11.5* 10.4* 11.6*   MCV 97  --  97 97   *  --  113* 122*     Most Recent 3 BMP's:  Recent Labs   Lab Test 04/20/24  1852 04/20/24  1811 04/20/24  0959 04/19/24  0826 04/18/24  1054 04/17/24  1037   *  --   --   --  137 140   POTASSIUM 3.9  --  3.7 4.1 3.8 3.9   CHLORIDE 98  --   --   --  103 102   CO2 28  --   --   --  27 24   BUN 8.8  --   --   --  14.8 21.5   CR 0.72  --   --   --  0.76 0.93   ANIONGAP 7  --   --   --  7 14   MANSOOR 8.5*  --   --   --  8.9 9.4   * 122*  --   --  120* 114*     7-Day Micro Results       No results found for the last 168 hours.          Most Recent Urinalysis:No lab results found.,   Results for orders placed or performed during the hospital encounter of 04/17/24   CT Head w/o Contrast    Narrative    EXAM: CT HEAD W/O CONTRAST  LOCATION: [facility]  DATE: 4/20/2024    INDICATION: Altered mental status, history of lung cancer.  COMPARISON: None available.  TECHNIQUE: Routine CT Head without IV contrast. Multiplanar reformats. Dose reduction techniques were used.    FINDINGS: There are presumed intra-axial metastases in the left frontal lobe and cerebellar vermis. The largest lesion is in the high posterior left frontal lobe measuring 3.8 x 2.0 x 3.5 cm in the greatest oblique AP, oblique transverse and   cephalocaudad dimensions respectively. The smaller left frontal lesion measures 9 mm diameter. The vermian lesion measures 3.7 x 3.3 x 3.0 cm in the AP, transverse and cephalocaudad dimensions respectively. There is significant vasogenic edema in the   left frontal lobe and midline cerebellum.    Mass effect due to the  left frontal lesions results in 5 mm rightward midline shift of the septum pellucidum and early or mild subfalcine herniation of the left frontal lobe towards the right. No evidence for transtentorial herniation. No hydrocephalus.   No evidence for ischemic infarct. No intracranial hemorrhage.      Impression    IMPRESSION:  1. Presumed intra-axial metastases in the left frontal lobe and cerebellar vermis as detailed above.  2. Moderate vasogenic edema surrounding the lesions in the left frontal lobe and cerebellum.  3. 5 mm rightward midline shift of the septum pellucidum due to the left frontal lesions and edema as well as early or mild subfalcine herniation of the left frontal lobe towards the right.  4. No evidence for intracranial hemorrhage, hydrocephalus or recent infarct.   CTA Head Neck with Contrast    Narrative    EXAM: CTA HEAD NECK W CONTRAST  LOCATION: [facility]  DATE: 4/20/2024    INDICATION: Altered mental status, history of lung cancer.  COMPARISON: None.  CONTRAST: 70 mL [id number].  TECHNIQUE: Head and neck CT angiogram with IV contrast. Axial helical CT images of the head and neck vessels obtained during the arterial phase of intravenous contrast administration. Axial 2D reconstructed images and multiplanar 3D MIP reconstructed   images of the head and neck vessels were performed by the technologist. Dose reduction techniques were used. All stenosis measurements made according to NASCET criteria unless otherwise specified.    FINDINGS:   HEAD CTA:  ANTERIOR CIRCULATION: No stenosis/occlusion, aneurysm, or high flow vascular malformation. Fetal origin of the right posterior cerebral artery from the anterior circulation.    POSTERIOR CIRCULATION: No stenosis/occlusion, aneurysm, or high flow vascular malformation. Dominant right and smaller left vertebral artery contribute to a normal basilar artery.     DURAL VENOUS SINUSES: Expected enhancement of the major dural venous  sinuses.    NECK CTA:  CAROTIDS: There is calcified plaque at the origins of the internal carotid arteries on both sides that does not result in any significant vascular narrowing on either side. The bilateral common carotid and bilateral cervical internal carotid arteries are   otherwise patent and unremarkable.    VERTEBRAL ARTERIES: No focal stenosis or dissection. Dominant right and smaller left vertebral arteries.    AORTIC ARCH: Classic aortic arch anatomy with no significant stenosis at the origin of the great vessels.    NONVASCULAR STRUCTURES: Unremarkable.      Impression    IMPRESSION:   HEAD CTA:   1.  Normal CTA [place].    NECK CTA:  1.  Plaque without stenosis at the internal carotid origins bilaterally.  2.  Otherwise, normal neck CTA.       Consultations This Hospital Stay   [facility] INPATIENT HOSPICE ADULT CONSULT  PHARMACY IP CONSULT  SPIRITUAL HEALTH SERVICES IP CONSULT    Primary Care Physician   [facility]    Time Spent on this Encounter   I, Kermit Bethea MD, personally saw the patient today and spent greater than 30 minutes discharging this patient.

## 2024-05-04 NOTE — PROGRESS NOTES
Patient time of death was 0415. Family notified. Belongings all belongings sent home, family request [place] to go [place] [facility] [place]. Pt's home meds were disposed of, next if kin, wife did not home meds and requested that they are disposed of.  Autopsy requested no. Death record completed. Patient sent to [facility] at 8:06 AM.

## 2024-09-19 NOTE — H&P
[facility]    History and Physical - Hospitalist Service       Date of Admission:  4/21/2024    Assessment & Plan   Ronnie Herrera Jr (preferred name William). is a 64 year old male who is being transitioned to inpatient hospice on 4/21/2024. Please see the discharge summary from the same date for more details of his hospital course.    Failure to thrive  Generalized weakness   Metastatic (lungs) adenocarcinoma pMMR, KRAS G12C mutated, of the rectum. (March 2022)   Recent rectal bleeding with progression of rectal cancer   Seizure secondary to brain mets to L frontal lobe and cerebellar vermis with vasogenic edema leading to mild midline shift, subfalcine herniation of L frontal lobe    Met with family, wife Malathi, daughter Nadia and William (preferred name). Their primary goal is comfort, not prolongation of life.  Wife feels he is suffering and does not want any prolongation of current process. They would like continued hospice care with SNF.   Allow to eat and drink as tolerates for comfort.   Continue Keppra 750 mg IV BID for now, unclear if he can take oral.   [facility]IP hospice consult and accepted to care.   Discussed with CM, patient is a  so they will discuss potential benefits with family  Comfort medications ordered including Roxanol 5 to 10 mg PO every hour as needed, IV morphine available as needed, Ativan 1 mg IV or tablet for anxiety or seizure.       Diet: Regular Diet Adult    Jo Catheter: Not present  Lines: None     Code Status: No CPR- Do NOT Intubate    Expected Discharge: working on discharge with hospice    Gunjan Agrawal MD  Hospitalist Service  [facility]  Securely message with [employer] (more info)  Text page via [employer] Paging/Directory     ______________________________________________________________________    Chief Complaint   Transitioning to inpatient hospice    History of Present Illness   Ronnie Herrera Jr. is a 64 year old  male who is being transitioned to inpatient hospice.  Please see the discharge summary from the same date for more details; a brief summary of his current symptoms is included here.    William (preferred name) was transition to comfort cares last evening after suffering a seizure and found to have brain mets with vasogenic edema.    Physical Exam   Vital Signs:                    Weight: 0 lbs 0 oz    Physical exam deferred given current goals of care    Medical Decision Making       20 additional  MINUTES SPENT BY ME on the date of service doing chart review, history, exam, documentation & further activities per the note.  For transition to GIP hospice.      [facility]

## (undated) RX ORDER — HEPARIN SODIUM (PORCINE) LOCK FLUSH IV SOLN 100 UNIT/ML 100 UNIT/ML
SOLUTION INTRAVENOUS
Status: DISPENSED
Start: 2024-01-01